# Patient Record
Sex: FEMALE | Race: WHITE | NOT HISPANIC OR LATINO | URBAN - METROPOLITAN AREA
[De-identification: names, ages, dates, MRNs, and addresses within clinical notes are randomized per-mention and may not be internally consistent; named-entity substitution may affect disease eponyms.]

---

## 2017-02-24 ENCOUNTER — OFFICE (AMBULATORY)
Dept: URBAN - METROPOLITAN AREA CLINIC 64 | Facility: CLINIC | Age: 57
End: 2017-02-24
Payer: OTHER GOVERNMENT

## 2017-02-24 ENCOUNTER — ON CAMPUS - OUTPATIENT (AMBULATORY)
Dept: URBAN - METROPOLITAN AREA HOSPITAL 2 | Facility: HOSPITAL | Age: 57
End: 2017-02-24
Payer: OTHER GOVERNMENT

## 2017-02-24 VITALS
SYSTOLIC BLOOD PRESSURE: 151 MMHG | DIASTOLIC BLOOD PRESSURE: 95 MMHG | OXYGEN SATURATION: 98 % | DIASTOLIC BLOOD PRESSURE: 80 MMHG | OXYGEN SATURATION: 93 % | DIASTOLIC BLOOD PRESSURE: 76 MMHG | DIASTOLIC BLOOD PRESSURE: 73 MMHG | DIASTOLIC BLOOD PRESSURE: 71 MMHG | TEMPERATURE: 97.5 F | HEIGHT: 67 IN | RESPIRATION RATE: 16 BRPM | SYSTOLIC BLOOD PRESSURE: 111 MMHG | HEART RATE: 93 BPM | RESPIRATION RATE: 18 BRPM | SYSTOLIC BLOOD PRESSURE: 103 MMHG | HEART RATE: 99 BPM | SYSTOLIC BLOOD PRESSURE: 136 MMHG | DIASTOLIC BLOOD PRESSURE: 75 MMHG | SYSTOLIC BLOOD PRESSURE: 127 MMHG | HEART RATE: 98 BPM | WEIGHT: 248 LBS | SYSTOLIC BLOOD PRESSURE: 154 MMHG | SYSTOLIC BLOOD PRESSURE: 120 MMHG | HEART RATE: 96 BPM | OXYGEN SATURATION: 99 % | OXYGEN SATURATION: 95 % | HEART RATE: 101 BPM | HEART RATE: 100 BPM | OXYGEN SATURATION: 94 % | DIASTOLIC BLOOD PRESSURE: 57 MMHG

## 2017-02-24 DIAGNOSIS — Z86.010 PERSONAL HISTORY OF COLONIC POLYPS: ICD-10-CM

## 2017-02-24 DIAGNOSIS — K62.1 RECTAL POLYP: ICD-10-CM

## 2017-02-24 DIAGNOSIS — K57.30 DIVERTICULOSIS OF LARGE INTESTINE WITHOUT PERFORATION OR ABS: ICD-10-CM

## 2017-02-24 DIAGNOSIS — K62.89 OTHER SPECIFIED DISEASES OF ANUS AND RECTUM: ICD-10-CM

## 2017-02-24 DIAGNOSIS — K64.8 OTHER HEMORRHOIDS: ICD-10-CM

## 2017-02-24 LAB
GI HISTOLOGY: A. UNSPECIFIED: (no result)
GI HISTOLOGY: PDF REPORT: (no result)

## 2017-02-24 PROCEDURE — 45380 COLONOSCOPY AND BIOPSY: CPT | Mod: 33 | Performed by: INTERNAL MEDICINE

## 2017-02-24 PROCEDURE — 88305 TISSUE EXAM BY PATHOLOGIST: CPT | Performed by: INTERNAL MEDICINE

## 2017-02-24 RX ADMIN — PROPOFOL: 10 INJECTION, EMULSION INTRAVENOUS at 10:26

## 2017-06-30 ENCOUNTER — HOSPITAL ENCOUNTER (OUTPATIENT)
Dept: RESPIRATORY THERAPY | Facility: HOSPITAL | Age: 57
Discharge: HOME | End: 2017-06-30
Attending: FAMILY MEDICINE | Admitting: FAMILY MEDICINE

## 2017-07-14 ENCOUNTER — HOSPITAL ENCOUNTER (OUTPATIENT)
Dept: GENERAL RADIOLOGY | Facility: HOSPITAL | Age: 57
Discharge: HOME | End: 2017-07-14
Attending: FAMILY MEDICINE | Admitting: FAMILY MEDICINE

## 2020-10-08 ENCOUNTER — OFFICE (AMBULATORY)
Dept: URBAN - METROPOLITAN AREA CLINIC 64 | Facility: CLINIC | Age: 60
End: 2020-10-08

## 2020-10-08 VITALS
HEART RATE: 73 BPM | HEIGHT: 67 IN | SYSTOLIC BLOOD PRESSURE: 136 MMHG | DIASTOLIC BLOOD PRESSURE: 100 MMHG | WEIGHT: 247 LBS

## 2020-10-08 DIAGNOSIS — K57.32 DIVERTICULITIS OF LARGE INTESTINE WITHOUT PERFORATION OR ABS: ICD-10-CM

## 2020-10-08 DIAGNOSIS — K21.9 GASTRO-ESOPHAGEAL REFLUX DISEASE WITHOUT ESOPHAGITIS: ICD-10-CM

## 2020-10-08 DIAGNOSIS — Z86.010 PERSONAL HISTORY OF COLONIC POLYPS: ICD-10-CM

## 2020-10-08 DIAGNOSIS — R10.31 RIGHT LOWER QUADRANT PAIN: ICD-10-CM

## 2020-10-08 DIAGNOSIS — R63.4 ABNORMAL WEIGHT LOSS: ICD-10-CM

## 2020-10-08 DIAGNOSIS — D37.3 NEOPLASM OF UNCERTAIN BEHAVIOR OF APPENDIX: ICD-10-CM

## 2020-10-08 PROCEDURE — 99203 OFFICE O/P NEW LOW 30 MIN: CPT | Performed by: INTERNAL MEDICINE

## 2020-11-09 ENCOUNTER — OFFICE (AMBULATORY)
Dept: URBAN - METROPOLITAN AREA PATHOLOGY 4 | Facility: PATHOLOGY | Age: 60
End: 2020-11-09
Payer: OTHER GOVERNMENT

## 2020-11-09 ENCOUNTER — ON CAMPUS - OUTPATIENT (AMBULATORY)
Dept: URBAN - METROPOLITAN AREA HOSPITAL 2 | Facility: HOSPITAL | Age: 60
End: 2020-11-09
Payer: OTHER GOVERNMENT

## 2020-11-09 VITALS
WEIGHT: 240 LBS | OXYGEN SATURATION: 94 % | SYSTOLIC BLOOD PRESSURE: 150 MMHG | DIASTOLIC BLOOD PRESSURE: 71 MMHG | SYSTOLIC BLOOD PRESSURE: 123 MMHG | SYSTOLIC BLOOD PRESSURE: 152 MMHG | DIASTOLIC BLOOD PRESSURE: 78 MMHG | HEART RATE: 68 BPM | RESPIRATION RATE: 18 BRPM | HEIGHT: 67 IN | DIASTOLIC BLOOD PRESSURE: 65 MMHG | DIASTOLIC BLOOD PRESSURE: 100 MMHG | SYSTOLIC BLOOD PRESSURE: 134 MMHG | OXYGEN SATURATION: 98 % | DIASTOLIC BLOOD PRESSURE: 91 MMHG | HEART RATE: 72 BPM | TEMPERATURE: 97.5 F | HEART RATE: 60 BPM | DIASTOLIC BLOOD PRESSURE: 66 MMHG | HEART RATE: 70 BPM | HEART RATE: 63 BPM | SYSTOLIC BLOOD PRESSURE: 146 MMHG | OXYGEN SATURATION: 100 % | SYSTOLIC BLOOD PRESSURE: 128 MMHG | RESPIRATION RATE: 16 BRPM | DIASTOLIC BLOOD PRESSURE: 73 MMHG

## 2020-11-09 DIAGNOSIS — Z90.49 ACQUIRED ABSENCE OF OTHER SPECIFIED PARTS OF DIGESTIVE TRACT: ICD-10-CM

## 2020-11-09 DIAGNOSIS — Z85.038 PERSONAL HISTORY OF OTHER MALIGNANT NEOPLASM OF LARGE INTEST: ICD-10-CM

## 2020-11-09 DIAGNOSIS — K57.30 DIVERTICULOSIS OF LARGE INTESTINE WITHOUT PERFORATION OR ABS: ICD-10-CM

## 2020-11-09 DIAGNOSIS — R10.31 RIGHT LOWER QUADRANT PAIN: ICD-10-CM

## 2020-11-09 DIAGNOSIS — K52.9 NONINFECTIVE GASTROENTERITIS AND COLITIS, UNSPECIFIED: ICD-10-CM

## 2020-11-09 DIAGNOSIS — R63.4 ABNORMAL WEIGHT LOSS: ICD-10-CM

## 2020-11-09 DIAGNOSIS — R93.3 ABNORMAL FINDINGS ON DIAGNOSTIC IMAGING OF OTHER PARTS OF DI: ICD-10-CM

## 2020-11-09 LAB
GI HISTOLOGY: A. UNSPECIFIED: (no result)
GI HISTOLOGY: PDF REPORT: (no result)

## 2020-11-09 PROCEDURE — 45380 COLONOSCOPY AND BIOPSY: CPT | Performed by: INTERNAL MEDICINE

## 2020-11-09 PROCEDURE — 88305 TISSUE EXAM BY PATHOLOGIST: CPT | Performed by: INTERNAL MEDICINE

## 2020-11-20 ENCOUNTER — OFFICE (AMBULATORY)
Dept: URBAN - METROPOLITAN AREA CLINIC 64 | Facility: CLINIC | Age: 60
End: 2020-11-20

## 2020-11-20 VITALS
SYSTOLIC BLOOD PRESSURE: 169 MMHG | HEIGHT: 67 IN | WEIGHT: 245 LBS | DIASTOLIC BLOOD PRESSURE: 86 MMHG | HEART RATE: 67 BPM

## 2020-11-20 DIAGNOSIS — R10.31 RIGHT LOWER QUADRANT PAIN: ICD-10-CM

## 2020-11-20 DIAGNOSIS — R63.4 ABNORMAL WEIGHT LOSS: ICD-10-CM

## 2020-11-20 DIAGNOSIS — Z86.010 PERSONAL HISTORY OF COLONIC POLYPS: ICD-10-CM

## 2020-11-20 DIAGNOSIS — R74.8 ABNORMAL LEVELS OF OTHER SERUM ENZYMES: ICD-10-CM

## 2020-11-20 DIAGNOSIS — D37.3 NEOPLASM OF UNCERTAIN BEHAVIOR OF APPENDIX: ICD-10-CM

## 2020-11-20 PROCEDURE — 99213 OFFICE O/P EST LOW 20 MIN: CPT | Performed by: INTERNAL MEDICINE

## 2021-02-23 ENCOUNTER — OFFICE (AMBULATORY)
Dept: URBAN - METROPOLITAN AREA CLINIC 64 | Facility: CLINIC | Age: 61
End: 2021-02-23

## 2021-02-23 VITALS
HEIGHT: 67 IN | SYSTOLIC BLOOD PRESSURE: 136 MMHG | HEART RATE: 77 BPM | WEIGHT: 243 LBS | DIASTOLIC BLOOD PRESSURE: 73 MMHG

## 2021-02-23 DIAGNOSIS — R10.31 RIGHT LOWER QUADRANT PAIN: ICD-10-CM

## 2021-02-23 DIAGNOSIS — R93.3 ABNORMAL FINDINGS ON DIAGNOSTIC IMAGING OF OTHER PARTS OF DI: ICD-10-CM

## 2021-02-23 DIAGNOSIS — D37.3 NEOPLASM OF UNCERTAIN BEHAVIOR OF APPENDIX: ICD-10-CM

## 2021-02-23 DIAGNOSIS — Z86.010 PERSONAL HISTORY OF COLONIC POLYPS: ICD-10-CM

## 2021-02-23 DIAGNOSIS — R63.4 ABNORMAL WEIGHT LOSS: ICD-10-CM

## 2021-02-23 PROCEDURE — 99211 OFF/OP EST MAY X REQ PHY/QHP: CPT | Performed by: INTERNAL MEDICINE

## 2021-05-17 ENCOUNTER — OFFICE (AMBULATORY)
Dept: URBAN - METROPOLITAN AREA CLINIC 64 | Facility: CLINIC | Age: 61
End: 2021-05-17

## 2021-05-17 VITALS
DIASTOLIC BLOOD PRESSURE: 78 MMHG | WEIGHT: 253 LBS | HEIGHT: 67 IN | SYSTOLIC BLOOD PRESSURE: 130 MMHG | HEART RATE: 69 BPM

## 2021-05-17 DIAGNOSIS — C7A.020 MALIGNANT CARCINOID TUMOR OF THE APPENDIX: ICD-10-CM

## 2021-05-17 PROCEDURE — 99213 OFFICE O/P EST LOW 20 MIN: CPT | Performed by: INTERNAL MEDICINE

## 2021-05-21 PROBLEM — R10.9 ABDOMINAL PAIN: Status: ACTIVE | Noted: 2020-02-08

## 2021-05-21 PROBLEM — Z80.1 FAMILY HISTORY OF LUNG CANCER: Status: ACTIVE | Noted: 2021-05-21

## 2021-05-21 PROBLEM — K38.8 MASS OF APPENDIX: Status: ACTIVE | Noted: 2020-03-20

## 2021-05-21 PROBLEM — G47.30 SLEEP APNEA: Status: ACTIVE | Noted: 2021-05-21

## 2021-05-21 RX ORDER — ALBUTEROL SULFATE 90 UG/1
2 AEROSOL, METERED RESPIRATORY (INHALATION)
COMMUNITY
End: 2021-12-10 | Stop reason: SDUPTHER

## 2021-05-21 RX ORDER — HYDROCODONE BITARTRATE AND ACETAMINOPHEN 5; 325 MG/1; MG/1
TABLET ORAL
COMMUNITY
End: 2021-05-24

## 2021-05-21 RX ORDER — DIPHENOXYLATE HYDROCHLORIDE AND ATROPINE SULFATE 2.5; .025 MG/1; MG/1
1 TABLET ORAL DAILY
COMMUNITY

## 2021-05-21 RX ORDER — ATORVASTATIN CALCIUM 20 MG/1
40 TABLET, FILM COATED ORAL DAILY
COMMUNITY
Start: 2013-05-28 | End: 2021-12-10 | Stop reason: SDUPTHER

## 2021-05-21 RX ORDER — SULFAMETHOXAZOLE AND TRIMETHOPRIM 800; 160 MG/1; MG/1
TABLET ORAL
COMMUNITY
End: 2021-05-24

## 2021-05-21 RX ORDER — CHLORAL HYDRATE 500 MG
1000 CAPSULE ORAL DAILY
COMMUNITY
End: 2021-12-10

## 2021-05-21 RX ORDER — METOPROLOL TARTRATE 50 MG/1
50 TABLET, FILM COATED ORAL
COMMUNITY
Start: 2013-05-28 | End: 2021-12-10 | Stop reason: SDUPTHER

## 2021-05-21 RX ORDER — HYDROCODONE BITARTRATE AND ACETAMINOPHEN 10; 325 MG/1; MG/1
TABLET ORAL
COMMUNITY
End: 2021-05-24

## 2021-05-21 RX ORDER — OMEPRAZOLE 40 MG/1
40 CAPSULE, DELAYED RELEASE ORAL DAILY
COMMUNITY
End: 2021-09-07 | Stop reason: SDUPTHER

## 2021-05-21 RX ORDER — METHYLPREDNISOLONE 4 MG/1
TABLET ORAL
COMMUNITY
End: 2021-05-24

## 2021-05-21 RX ORDER — MELOXICAM 15 MG/1
TABLET ORAL
COMMUNITY
Start: 2017-01-17 | End: 2021-05-24

## 2021-05-21 RX ORDER — ESOMEPRAZOLE MAGNESIUM 40 MG/1
CAPSULE, DELAYED RELEASE ORAL
COMMUNITY
Start: 2015-07-16 | End: 2021-05-24

## 2021-05-21 RX ORDER — CHLORAL HYDRATE 500 MG
CAPSULE ORAL
COMMUNITY
End: 2021-05-24

## 2021-05-21 RX ORDER — AMOXICILLIN AND CLAVULANATE POTASSIUM 875; 125 MG/1; MG/1
TABLET, FILM COATED ORAL
COMMUNITY
End: 2021-05-24

## 2021-05-21 RX ORDER — FERROUS SULFATE 325(65) MG
TABLET ORAL
COMMUNITY
Start: 2016-11-15 | End: 2021-05-24

## 2021-05-21 RX ORDER — LISINOPRIL 20 MG/1
20 TABLET ORAL DAILY
COMMUNITY
Start: 2013-05-28 | End: 2021-06-18 | Stop reason: SDUPTHER

## 2021-05-24 ENCOUNTER — OFFICE VISIT (OUTPATIENT)
Dept: SURGERY | Facility: CLINIC | Age: 61
End: 2021-05-24

## 2021-05-24 VITALS
BODY MASS INDEX: 40.34 KG/M2 | HEART RATE: 75 BPM | WEIGHT: 251 LBS | TEMPERATURE: 97.3 F | OXYGEN SATURATION: 98 % | HEIGHT: 66 IN | SYSTOLIC BLOOD PRESSURE: 128 MMHG | DIASTOLIC BLOOD PRESSURE: 88 MMHG

## 2021-05-24 DIAGNOSIS — D49.0 NEOPLASM OF APPENDIX: Primary | ICD-10-CM

## 2021-05-24 PROCEDURE — 99244 OFF/OP CNSLTJ NEW/EST MOD 40: CPT | Performed by: COLON & RECTAL SURGERY

## 2021-05-24 RX ORDER — ONDANSETRON 4 MG/1
TABLET, ORALLY DISINTEGRATING ORAL
COMMUNITY
Start: 2021-05-14 | End: 2021-05-24

## 2021-05-24 RX ORDER — DICLOFENAC SODIUM 75 MG/1
75 TABLET, DELAYED RELEASE ORAL
COMMUNITY
Start: 2021-02-20 | End: 2021-05-24

## 2021-05-24 RX ORDER — DICYCLOMINE HCL 20 MG
TABLET ORAL
COMMUNITY
Start: 2021-05-14 | End: 2021-05-24

## 2021-06-02 ENCOUNTER — TELEPHONE (OUTPATIENT)
Dept: SURGERY | Facility: CLINIC | Age: 61
End: 2021-06-02

## 2021-06-02 PROBLEM — D3A.020 CARCINOID TUMOR OF APPENDIX: Status: ACTIVE | Noted: 2021-06-02

## 2021-06-02 NOTE — TELEPHONE ENCOUNTER
Patient called asking if you have spoken to specialist regarding possible treatment options with surgery at [facility] [[email or web address] in [place]?    Please advice.    Home contact #[phone].    Thank you.

## 2021-06-09 DIAGNOSIS — K38.8 APPENDICULAR MUCOCELE: Primary | ICD-10-CM

## 2021-06-09 NOTE — TELEPHONE ENCOUNTER
Neeta Ellis is a surgical oncologist.  I will send referral bowel sounds normal/no distention/soft/nontender

## 2021-06-09 NOTE — TELEPHONE ENCOUNTER
Phone patient and gave her (Actually spelled it out for her) the Surgical Oncologist's name at [facility] that Dr. Castano has referred patient to, she was very happy.    Thank you.

## 2021-06-14 PROBLEM — D64.9 ANEMIA: Status: ACTIVE | Noted: 2020-04-01

## 2021-06-14 PROBLEM — M72.2 PLANTAR FASCIITIS, RIGHT: Status: ACTIVE | Noted: 2021-02-20

## 2021-06-14 PROBLEM — K38.8 MASS OF APPENDIX: Status: RESOLVED | Noted: 2020-03-20 | Resolved: 2021-06-14

## 2021-06-14 PROBLEM — D37.3 LOW GRADE MUCINOUS NEOPLASM OF APPENDIX: Status: ACTIVE | Noted: 2020-03-20

## 2021-06-14 PROBLEM — R06.00 DYSPNEA: Status: ACTIVE | Noted: 2020-07-13

## 2021-06-14 PROBLEM — A49.02 MRSA INFECTION: Status: ACTIVE | Noted: 2020-04-25

## 2021-06-14 PROBLEM — M77.31 CALCANEAL SPUR OF FOOT, RIGHT: Status: ACTIVE | Noted: 2021-02-20

## 2021-06-14 NOTE — PATIENT INSTRUCTIONS
Health Maintenance Due   Topic Date Due   • COLORECTAL CANCER SCREENING  Never done   • ANNUAL PHYSICAL  Never done   • Pneumococcal Vaccine 0-64 (1 of 1 - PPSV23) Never done   • COVID-19 Vaccine (1) Never done   • TDAP/TD VACCINES (1 - Tdap) 08/09/1980   • ZOSTER VACCINE (1 of 2) Never done   • MAMMOGRAM  05/20/2015   • HEPATITIS C SCREENING  Never done   • PAP SMEAR  Never done   • LIPID PANEL  Never done     Check blood pressure cuff for accuracy and send 10 blood pressures over 2 weeks.  Watch sodium, alcohol and weight    Patient to call Thursday about [facility] appointment.  Patient to have Dr. Sasha Castano's office send records(staff to give phone number for records to be sent)    Don't get mammogram for 6 weeks after second Covid shot.

## 2021-06-15 ENCOUNTER — OFFICE VISIT (OUTPATIENT)
Dept: FAMILY MEDICINE CLINIC | Facility: CLINIC | Age: 61
End: 2021-06-15

## 2021-06-15 VITALS
OXYGEN SATURATION: 94 % | HEIGHT: 66 IN | TEMPERATURE: 98 F | SYSTOLIC BLOOD PRESSURE: 157 MMHG | HEART RATE: 71 BPM | DIASTOLIC BLOOD PRESSURE: 74 MMHG | BODY MASS INDEX: 40.98 KG/M2 | WEIGHT: 255 LBS

## 2021-06-15 DIAGNOSIS — K21.9 GASTROESOPHAGEAL REFLUX DISEASE WITHOUT ESOPHAGITIS: ICD-10-CM

## 2021-06-15 DIAGNOSIS — J45.909 ASTHMA, UNSPECIFIED ASTHMA SEVERITY, UNSPECIFIED WHETHER COMPLICATED, UNSPECIFIED WHETHER PERSISTENT: ICD-10-CM

## 2021-06-15 DIAGNOSIS — Z12.31 ENCOUNTER FOR SCREENING MAMMOGRAM FOR MALIGNANT NEOPLASM OF BREAST: ICD-10-CM

## 2021-06-15 DIAGNOSIS — G47.30 SLEEP APNEA, UNSPECIFIED TYPE: ICD-10-CM

## 2021-06-15 DIAGNOSIS — Z91.89 ENCOUNTER FOR HEPATITIS C VIRUS SCREENING TEST FOR HIGH RISK PATIENT: ICD-10-CM

## 2021-06-15 DIAGNOSIS — D64.9 ANEMIA, UNSPECIFIED TYPE: ICD-10-CM

## 2021-06-15 DIAGNOSIS — K59.00 CONSTIPATION, UNSPECIFIED CONSTIPATION TYPE: ICD-10-CM

## 2021-06-15 DIAGNOSIS — E55.9 VITAMIN D DEFICIENCY: ICD-10-CM

## 2021-06-15 DIAGNOSIS — D37.3 LOW GRADE MUCINOUS NEOPLASM OF APPENDIX: ICD-10-CM

## 2021-06-15 DIAGNOSIS — Z00.01 ENCOUNTER FOR ANNUAL GENERAL MEDICAL EXAMINATION WITH ABNORMAL FINDINGS IN ADULT: Primary | ICD-10-CM

## 2021-06-15 DIAGNOSIS — Z11.4 SCREENING FOR HIV (HUMAN IMMUNODEFICIENCY VIRUS): ICD-10-CM

## 2021-06-15 DIAGNOSIS — E78.5 HYPERLIPIDEMIA, UNSPECIFIED HYPERLIPIDEMIA TYPE: ICD-10-CM

## 2021-06-15 DIAGNOSIS — Z11.59 ENCOUNTER FOR HEPATITIS C VIRUS SCREENING TEST FOR HIGH RISK PATIENT: ICD-10-CM

## 2021-06-15 DIAGNOSIS — E07.9 DISEASE OF THYROID GLAND: ICD-10-CM

## 2021-06-15 DIAGNOSIS — N83.8 OVARIAN ENLARGEMENT, LEFT: ICD-10-CM

## 2021-06-15 DIAGNOSIS — F32.A DEPRESSION, UNSPECIFIED DEPRESSION TYPE: ICD-10-CM

## 2021-06-15 DIAGNOSIS — E66.01 CLASS 3 SEVERE OBESITY DUE TO EXCESS CALORIES WITH SERIOUS COMORBIDITY AND BODY MASS INDEX (BMI) OF 40.0 TO 44.9 IN ADULT (HCC): ICD-10-CM

## 2021-06-15 DIAGNOSIS — I10 ESSENTIAL HYPERTENSION: ICD-10-CM

## 2021-06-15 DIAGNOSIS — Z78.0 POST-MENOPAUSAL: ICD-10-CM

## 2021-06-15 PROBLEM — E66.813 CLASS 3 SEVERE OBESITY WITH SERIOUS COMORBIDITY AND BODY MASS INDEX (BMI) OF 40.0 TO 44.9 IN ADULT: Status: ACTIVE | Noted: 2021-06-15

## 2021-06-15 PROCEDURE — 99396 PREV VISIT EST AGE 40-64: CPT | Performed by: PREVENTIVE MEDICINE

## 2021-06-15 PROCEDURE — 99214 OFFICE O/P EST MOD 30 MIN: CPT | Performed by: PREVENTIVE MEDICINE

## 2021-06-15 NOTE — PROGRESS NOTES
Subjective   Eve Garrett is a 60 y.o. female presents for   Chief Complaint   Patient presents with   • Annual Exam     non fasting (had coffee this morning with creamer)       Health Maintenance Due   Topic Date Due   • COLORECTAL CANCER SCREENING  Never done   • ANNUAL PHYSICAL  Never done   • Pneumococcal Vaccine 0-64 (1 of 1 - PPSV23) Never done   • TDAP/TD VACCINES (1 - Tdap) 08/09/1980   • ZOSTER VACCINE (1 of 2) Never done   • MAMMOGRAM  05/20/2015   • HEPATITIS C SCREENING  Never done   • PAP SMEAR  Never done   • LIPID PANEL  Never done       60-year-old white female presents today for annual wellness and to transition into care as well as to follow-up on multiple chronic health conditions patient has been advised to wear sunscreen and seatbelt.  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  She has seen multiple specialists and needs to be referred to you we stopped and pursued this throughout the exam and have referred her to see an oncology surgeon in [place].  Patient has multiple family members with cancer and we will suggest that she does get some genetic cancer cancer screening when she returns.  Patient has also had some enlargement of an ovary that was never followed up on so we have ordered an ultrasound of the ovary as well.  Patient has had her Covid vaccination and will not get her mammogram for 6 weeks after her second shot.  Patient has also had multiple episodes of diverticulitis in 2 lengths of her colon have been removed in prior surgery.  Patient has not been running a fever and has had only mild increase in abdominal pain recently.  She does express that she is quite nervous over this cancer diagnosis and that is why we are trying to make the referral soon as possible she does not feel as though she is homicidal or suicidal       Vitals:    06/15/21 0934 06/15/21 0936   BP: 149/81 157/74   BP Location: Right arm Left arm   Patient Position: Sitting  "Sitting   Cuff Size: Adult Adult   Pulse: 71    Temp: 98 °F (36.7 °C)    SpO2: 94%    Weight: 116 kg (255 lb)    Height: 167.6 cm (65.98\")      Body mass index is 41.18 kg/m².    Current Outpatient Medications on File Prior to Visit   Medication Sig Dispense Refill   • albuterol sulfate  (90 Base) MCG/ACT inhaler Inhale 2 puffs.     • atorvastatin (LIPITOR) 20 MG tablet Take 20 mg by mouth Daily.     • lisinopril (PRINIVIL,ZESTRIL) 20 MG tablet Take 20 mg by mouth Daily.     • metoprolol tartrate (LOPRESSOR) 50 MG tablet Take 50 mg by mouth.     • multivitamin (THERAGRAN) tablet tablet Take 1 tablet by mouth Daily.     • Omega-3 Fatty Acids (fish oil) 1000 MG capsule capsule Take 1,000 mg by mouth Daily.     • omeprazole (priLOSEC) 40 MG capsule Take 40 mg by mouth Daily.     • Triamcinolone Acetonide (NASACORT AQ NA) into the nostril(s) as directed by provider.       No current facility-administered medications on file prior to visit.       The following portions of the patient's history were reviewed and updated as appropriate: allergies, current medications, past family history, past medical history, past social history, past surgical history and problem list.    Review of Systems   Constitutional: Negative.    HENT: Negative.  Negative for sinus pressure and sore throat.    Eyes: Negative.    Respiratory: Negative.  Negative for cough.    Cardiovascular: Negative.    Gastrointestinal: Positive for abdominal pain, constipation, diarrhea, nausea and indigestion.   Endocrine: Negative.    Genitourinary: Negative.    Musculoskeletal: Positive for arthralgias.   Skin: Negative.    Allergic/Immunologic: Positive for environmental allergies.   Neurological: Negative.    Hematological: Negative.    Psychiatric/Behavioral: Positive for stress. The patient is nervous/anxious.        Objective   Physical Exam  Vitals reviewed.   Constitutional:       General: She is not in acute distress.     Appearance: She is " well-developed. She is obese. She is not ill-appearing or toxic-appearing.   HENT:      Head: Normocephalic and atraumatic.      Right Ear: Tympanic membrane, ear canal and external ear normal.      Left Ear: Tympanic membrane, ear canal and external ear normal.      Nose: Nose normal.   Eyes:      Extraocular Movements: Extraocular movements intact.      Conjunctiva/sclera: Conjunctivae normal.      Pupils: Pupils are equal, round, and reactive to light.   Cardiovascular:      Rate and Rhythm: Normal rate and regular rhythm.      Heart sounds: Normal heart sounds.   Pulmonary:      Effort: Pulmonary effort is normal.      Breath sounds: Normal breath sounds.   Abdominal:      General: Bowel sounds are normal. There is no distension.      Palpations: Abdomen is soft. There is no mass.      Tenderness: There is no abdominal tenderness.   Musculoskeletal:         General: Normal range of motion.      Cervical back: Neck supple.   Skin:     General: Skin is warm.   Neurological:      General: No focal deficit present.      Mental Status: She is alert and oriented to person, place, and time.   Psychiatric:         Mood and Affect: Mood normal.         Behavior: Behavior normal.       PHQ-9 Total Score: 0    Assessment/Plan   Diagnoses and all orders for this visit:    1. Encounter for annual general medical examination with abnormal findings in adult (Primary)  Comments:  Patient has been advised to wear sunscreen and seatbelt    2. Encounter for screening mammogram for malignant neoplasm of breast  -     Mammo Screening Digital Tomosynthesis Bilateral With CAD; Future    3. Screening for HIV (human immunodeficiency virus)  -     HIV-1 & HIV-2 Antibodies    4. Encounter for hepatitis C virus screening test for high risk patient  -     Hepatitis C Antibody    5. Asthma, unspecified asthma severity, unspecified whether complicated, unspecified whether persistent  Comments:  Try Incruse and have albuterol on hand    6.  Constipation, unspecified constipation type  Comments:  Patient has alternating constipation and diarrhea    7. Depression, unspecified depression type  -     TSH  -     Vitamin B12    8. Gastroesophageal reflux disease without esophagitis  -     CBC Auto Differential  -     Magnesium    9. Essential hypertension  Comments:  Didn't take med today and will on home moniter.  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 Hyperlipidemia, unspecified hyperlipidemia type  Comments:  Trying to eat less saturated fats  Orders:  -     Lipid Panel    12. Sleep apnea, unspecified type  Comments:  Couldn't wear full mask.  Refer for O2 or nasal pillow  Orders:  -     Ambulatory Referral to Sleep Medicine    13. Anemia, unspecified type  Comments:  No noted blood loss    14. Disease of thyroid gland  Comments:  No dry skin palpitations or increased hair loss    15. Ovarian enlargement, left  Comments:  US ordered as has had no F/U  Orders:  -     US Non-ob Transvaginal; Future    16. Post-menopausal  Comments:  DXA ordered  Orders:  -     DEXA Bone Density Axial; Future    17. Vitamin D deficiency  Comments:  nOT TAKING  Orders:  -     Vitamin D 25 Hydroxy    18. Class 3 severe obesity due to excess calories with serious comorbidity and body mass index (BMI) of 40.0 to 44.9 in adult (CMS/[facility])    Other orders  -     Cancel: Ambulatory Referral For Screening Colonoscopy  -     Umeclidinium Bromide (Incruse Ellipta) 62.5 MCG/INH aerosol powder ; Inhale 1 puff Daily.  Dispense: 1 each; Refill: 1        Patient Instructions     Health Maintenance Due   Topic Date Due   • COLORECTAL CANCER SCREENING  Never done   • ANNUAL PHYSICAL  Never done   • Pneumococcal Vaccine 0-64 (1 of 1 - PPSV23) Never done   • COVID-19 Vaccine (1) Never done   • TDAP/TD VACCINES (1 - Tdap) 08/09/1980   • ZOSTER VACCINE (1 of 2) Never done   • MAMMOGRAM   05/20/2015   • HEPATITIS C SCREENING  Never done   • PAP SMEAR  Never done   • LIPID PANEL  Never done     Check blood pressure cuff for accuracy and send 10 blood pressures over 2 weeks.  Watch sodium, alcohol and weight    Patient to call Thursday about [facility] appointment.  Patient to have Dr. Sasha Castano's office send records(staff to give phone number for records to be sent)    Don't get mammogram for 6 weeks after second Covid shot.

## 2021-06-16 ENCOUNTER — TELEPHONE (OUTPATIENT)
Dept: FAMILY MEDICINE CLINIC | Facility: CLINIC | Age: 61
End: 2021-06-16

## 2021-06-16 NOTE — TELEPHONE ENCOUNTER
I called and spoke with Cookie @ Dr. Terry Castano's office. She took all info that was provided about pt's preference to go to [facility] in [facility] to see Dr. Neeta Ellis I gave her their phone numbers [phone] & [phone]. Their fax # [phone]. Advised all records needed to be sent in AttN: [facility]. Once [facility] gets all info and is able to review they will schedule pt ASAP.    Cookie advised that she would update Dr. Castano with Pt's preference and would get all of the previous records forwarded.

## 2021-06-17 ENCOUNTER — TELEPHONE (OUTPATIENT)
Dept: FAMILY MEDICINE CLINIC | Facility: CLINIC | Age: 61
End: 2021-06-17

## 2021-06-17 ENCOUNTER — CLINICAL SUPPORT (OUTPATIENT)
Dept: FAMILY MEDICINE CLINIC | Facility: CLINIC | Age: 61
End: 2021-06-17

## 2021-06-17 DIAGNOSIS — R10.84 GENERALIZED ABDOMINAL PAIN: ICD-10-CM

## 2021-06-17 LAB
25(OH)D3 SERPL-MCNC: 19.1 NG/ML (ref 30–100)
ALBUMIN SERPL-MCNC: 4.1 G/DL (ref 3.5–5.2)
ALBUMIN/GLOB SERPL: 1.6 G/DL
ALP SERPL-CCNC: 103 U/L (ref 39–117)
ALT SERPL W P-5'-P-CCNC: 24 U/L (ref 1–33)
ANION GAP SERPL CALCULATED.3IONS-SCNC: 9 MMOL/L (ref 5–15)
ANISOCYTOSIS BLD QL: ABNORMAL
AST SERPL-CCNC: 22 U/L (ref 1–32)
BILIRUB SERPL-MCNC: 0.3 MG/DL (ref 0–1.2)
BUN SERPL-MCNC: 12 MG/DL (ref 6–20)
BUN/CREAT SERPL: 18.2 (ref 7–25)
CALCIUM SPEC-SCNC: 9.1 MG/DL (ref 8.6–10.5)
CHLORIDE SERPL-SCNC: 104 MMOL/L (ref 98–107)
CHOLEST SERPL-MCNC: 149 MG/DL (ref 0–200)
CO2 SERPL-SCNC: 25 MMOL/L (ref 22–29)
CREAT SERPL-MCNC: 0.66 MG/DL (ref 0.57–1)
DEPRECATED RDW RBC AUTO: 41.8 FL (ref 37–54)
EOSINOPHIL # BLD MANUAL: 0.39 10*3/MM3 (ref 0–0.4)
EOSINOPHIL NFR BLD MANUAL: 6.1 % (ref 0.3–6.2)
ERYTHROCYTE [DISTWIDTH] IN BLOOD BY AUTOMATED COUNT: 15.8 % (ref 12.3–15.4)
GFR SERPL CREATININE-BSD FRML MDRD: 92 ML/MIN/1.73
GLOBULIN UR ELPH-MCNC: 2.6 GM/DL
GLUCOSE SERPL-MCNC: 126 MG/DL (ref 65–99)
HCT VFR BLD AUTO: 37.8 % (ref 34–46.6)
HCV AB SER DONR QL: NORMAL
HDLC SERPL-MCNC: 35 MG/DL (ref 40–60)
HGB BLD-MCNC: 11.6 G/DL (ref 12–15.9)
HIV1+2 AB SER QL: NORMAL
LDLC SERPL CALC-MCNC: 80 MG/DL (ref 0–100)
LDLC/HDLC SERPL: 2.1 {RATIO}
LYMPHOCYTES # BLD MANUAL: 2.28 10*3/MM3 (ref 0.7–3.1)
LYMPHOCYTES NFR BLD MANUAL: 12.1 % (ref 5–12)
LYMPHOCYTES NFR BLD MANUAL: 35.4 % (ref 19.6–45.3)
MAGNESIUM SERPL-MCNC: 2 MG/DL (ref 1.6–2.6)
MCH RBC QN AUTO: 22.7 PG (ref 26.6–33)
MCHC RBC AUTO-ENTMCNC: 30.7 G/DL (ref 31.5–35.7)
MCV RBC AUTO: 74 FL (ref 79–97)
MICROCYTES BLD QL: ABNORMAL
MONOCYTES # BLD AUTO: 0.78 10*3/MM3 (ref 0.1–0.9)
NEUTROPHILS # BLD AUTO: 2.99 10*3/MM3 (ref 1.7–7)
NEUTROPHILS NFR BLD MANUAL: 46.5 % (ref 42.7–76)
PLAT MORPH BLD: NORMAL
PLATELET # BLD AUTO: 316 10*3/MM3 (ref 140–450)
PMV BLD AUTO: 10.2 FL (ref 6–12)
POLYCHROMASIA BLD QL SMEAR: ABNORMAL
POTASSIUM SERPL-SCNC: 4 MMOL/L (ref 3.5–5.2)
PROT SERPL-MCNC: 6.7 G/DL (ref 6–8.5)
RBC # BLD AUTO: 5.11 10*6/MM3 (ref 3.77–5.28)
SODIUM SERPL-SCNC: 138 MMOL/L (ref 136–145)
TRIGL SERPL-MCNC: 203 MG/DL (ref 0–150)
TSH SERPL DL<=0.05 MIU/L-ACNC: 3.68 UIU/ML (ref 0.27–4.2)
VIT B12 BLD-MCNC: 449 PG/ML (ref 211–946)
VLDLC SERPL-MCNC: 34 MG/DL (ref 5–40)
WBC # BLD AUTO: 6.44 10*3/MM3 (ref 3.4–10.8)
WBC MORPH BLD: NORMAL

## 2021-06-17 PROCEDURE — 85007 BL SMEAR W/DIFF WBC COUNT: CPT | Performed by: PREVENTIVE MEDICINE

## 2021-06-17 PROCEDURE — 84466 ASSAY OF TRANSFERRIN: CPT | Performed by: PREVENTIVE MEDICINE

## 2021-06-17 PROCEDURE — 82728 ASSAY OF FERRITIN: CPT | Performed by: PREVENTIVE MEDICINE

## 2021-06-17 PROCEDURE — 86803 HEPATITIS C AB TEST: CPT | Performed by: PREVENTIVE MEDICINE

## 2021-06-17 PROCEDURE — 82306 VITAMIN D 25 HYDROXY: CPT | Performed by: PREVENTIVE MEDICINE

## 2021-06-17 PROCEDURE — 85025 COMPLETE CBC W/AUTO DIFF WBC: CPT | Performed by: PREVENTIVE MEDICINE

## 2021-06-17 PROCEDURE — 82746 ASSAY OF FOLIC ACID SERUM: CPT | Performed by: PREVENTIVE MEDICINE

## 2021-06-17 PROCEDURE — 36415 COLL VENOUS BLD VENIPUNCTURE: CPT | Performed by: PREVENTIVE MEDICINE

## 2021-06-17 PROCEDURE — 80061 LIPID PANEL: CPT | Performed by: PREVENTIVE MEDICINE

## 2021-06-17 PROCEDURE — G0432 EIA HIV-1/HIV-2 SCREEN: HCPCS | Performed by: PREVENTIVE MEDICINE

## 2021-06-17 PROCEDURE — 83735 ASSAY OF MAGNESIUM: CPT | Performed by: PREVENTIVE MEDICINE

## 2021-06-17 PROCEDURE — 84443 ASSAY THYROID STIM HORMONE: CPT | Performed by: PREVENTIVE MEDICINE

## 2021-06-17 PROCEDURE — 82607 VITAMIN B-12: CPT | Performed by: PREVENTIVE MEDICINE

## 2021-06-17 PROCEDURE — 85045 AUTOMATED RETICULOCYTE COUNT: CPT | Performed by: PREVENTIVE MEDICINE

## 2021-06-17 PROCEDURE — 80053 COMPREHEN METABOLIC PANEL: CPT | Performed by: PREVENTIVE MEDICINE

## 2021-06-17 PROCEDURE — 83540 ASSAY OF IRON: CPT | Performed by: PREVENTIVE MEDICINE

## 2021-06-17 RX ORDER — TIOTROPIUM BROMIDE INHALATION SPRAY 1.56 UG/1
2 SPRAY, METERED RESPIRATORY (INHALATION)
Qty: 3 EACH | Refills: 3 | Status: SHIPPED | OUTPATIENT
Start: 2021-06-17 | End: 2021-12-10

## 2021-06-17 NOTE — TELEPHONE ENCOUNTER
Caller: Katy Garrett    Relationship: Self    Best call back number:[phone]   What is the best time to reach you:   Who are you requesting to speak with (clinical staff, provider,  specific staff member): CLINICAL    Do you know the name of the person who called:KATY    What was the call regarding:  PATIENTS PHARMACY DOES NOT HAVE INCRUSE ELLIPTA SO SHE WOULD LIKE YOU TO SEND SPIRIVA INSTEAD   Do you require a callback:NO JUST SEND TO PHARMACY    [facility] -  [place], [place] - [address] - [phone] [facility] [phone]   [phone]

## 2021-06-17 NOTE — PROGRESS NOTES
Venipuncture Blood Specimen Collection  Venipuncture performed in right hand by Margaret Flaherty MA with good hemostasis. Patient tolerated the procedure well without complications.   06/17/21   Margaret Flaherty MA

## 2021-06-18 ENCOUNTER — TELEPHONE (OUTPATIENT)
Dept: FAMILY MEDICINE CLINIC | Facility: CLINIC | Age: 61
End: 2021-06-18

## 2021-06-18 DIAGNOSIS — D64.9 ANEMIA, UNSPECIFIED TYPE: Primary | ICD-10-CM

## 2021-06-18 LAB
FERRITIN SERPL-MCNC: 12.7 NG/ML (ref 13–150)
FOLATE SERPL-MCNC: 6.29 NG/ML (ref 4.78–24.2)
IRON 24H UR-MRATE: 51 MCG/DL (ref 37–145)
IRON SATN MFR SERPL: 12 % (ref 20–50)
RETICS # AUTO: 0.09 10*6/MM3 (ref 0.02–0.13)
RETICS/RBC NFR AUTO: 1.78 % (ref 0.7–1.9)
TIBC SERPL-MCNC: 432 MCG/DL (ref 298–536)
TRANSFERRIN SERPL-MCNC: 290 MG/DL (ref 200–360)

## 2021-06-18 RX ORDER — LISINOPRIL 20 MG/1
20 TABLET ORAL DAILY
Qty: 90 TABLET | Refills: 3 | Status: SHIPPED | OUTPATIENT
Start: 2021-06-18 | End: 2021-10-12 | Stop reason: DRUGHIGH

## 2021-06-18 NOTE — PROGRESS NOTES
Vitamin d is low so take or increase otc daily.  Mild anemia that is improving and looks like may be iron dependent.  Eat foods rich in iron like green leafy vegetables.  Glucose elevated to 126 if this was fasting, so limit carbs and walk.  Finally bad cholesterol 80, so watch sat fats and walk.

## 2021-06-18 NOTE — TELEPHONE ENCOUNTER
HUB TO READ  ----- Message from Nunu Kingston MD sent at 6/18/2021  1:47 PM EDT -----  Vitamin d is low so take or increase otc daily.  Mild anemia that is improving and looks like may be iron dependent.  Eat foods rich in iron like green leafy vegetables.  Glucose elevated to 126 if this was fasting, so limit carbs and walk.  Finally bad cholesterol 80, so watch sat fats and walk.

## 2021-06-23 ENCOUNTER — TELEPHONE (OUTPATIENT)
Dept: FAMILY MEDICINE CLINIC | Facility: CLINIC | Age: 61
End: 2021-06-23

## 2021-06-23 NOTE — TELEPHONE ENCOUNTER
----- Message from Nunu Kingston MD sent at 6/23/2021  9:56 AM EDT -----      ----- Message -----  From: Terry Castano MD  Sent: 6/22/2021   7:22 PM EDT  To: Nunu Kingston MD    Author: Nunu Kingston MD Service: -- Author Type: Physician   Filed: 06/23/21 0957 Encounter Date: 5/24/2021 Status: Signed   : Nunu Kingston MD (Physician)        Show:Clear all  [x]Manual[]Template[]Copied    Added by:  [x]Nunu Kingston MD    []Hover for details  Please call patient and make sure she has been seen by [facility] doctors for Appendix cancer

## 2021-06-23 NOTE — TELEPHONE ENCOUNTER
----- Message from Nunu Kingston MD sent at 6/23/2021  9:56 AM EDT -----      ----- Message -----  From: Terry Castano MD  Sent: 6/22/2021   7:22 PM EDT  To: Nunu Kingston MD

## 2021-06-25 ENCOUNTER — HOSPITAL ENCOUNTER (OUTPATIENT)
Dept: ULTRASOUND IMAGING | Facility: HOSPITAL | Age: 61
Discharge: HOME | End: 2021-06-25
Admitting: PREVENTIVE MEDICINE

## 2021-06-25 DIAGNOSIS — N83.8 OVARIAN ENLARGEMENT, LEFT: ICD-10-CM

## 2021-06-25 PROCEDURE — 93976 VASCULAR STUDY: CPT

## 2021-06-25 PROCEDURE — 76830 TRANSVAGINAL US NON-OB: CPT

## 2021-06-28 ENCOUNTER — TELEPHONE (OUTPATIENT)
Dept: FAMILY MEDICINE CLINIC | Facility: CLINIC | Age: 61
End: 2021-06-28

## 2021-06-28 NOTE — TELEPHONE ENCOUNTER
Hub to read    ----- Message from Nunu Kingston MD sent at 6/25/2021  6:02 PM EDT -----  Both ovaries NS/NS  Can refer to GYN if she desires.

## 2021-06-28 NOTE — TELEPHONE ENCOUNTER
I called and notified pt. She verbalized understanding, and doesn't want to go for GYN at this time.

## 2021-08-16 ENCOUNTER — APPOINTMENT (OUTPATIENT)
Dept: CT IMAGING | Facility: HOSPITAL | Age: 61
End: 2021-08-16

## 2021-08-16 ENCOUNTER — HOSPITAL ENCOUNTER (EMERGENCY)
Facility: HOSPITAL | Age: 61
Discharge: HOME | End: 2021-08-16
Attending: EMERGENCY MEDICINE | Admitting: EMERGENCY MEDICINE

## 2021-08-16 ENCOUNTER — HOSPITAL ENCOUNTER (OUTPATIENT)
Dept: BONE DENSITY | Facility: HOSPITAL | Age: 61
Discharge: HOME | End: 2021-08-16

## 2021-08-16 ENCOUNTER — TELEPHONE (OUTPATIENT)
Dept: FAMILY MEDICINE CLINIC | Facility: CLINIC | Age: 61
End: 2021-08-16

## 2021-08-16 ENCOUNTER — HOSPITAL ENCOUNTER (OUTPATIENT)
Dept: MAMMOGRAPHY | Facility: HOSPITAL | Age: 61
Discharge: HOME | End: 2021-08-16

## 2021-08-16 VITALS
BODY MASS INDEX: 42.27 KG/M2 | RESPIRATION RATE: 16 BRPM | OXYGEN SATURATION: 99 % | HEIGHT: 65 IN | DIASTOLIC BLOOD PRESSURE: 82 MMHG | SYSTOLIC BLOOD PRESSURE: 170 MMHG | TEMPERATURE: 98.6 F | WEIGHT: 253.71 LBS | HEART RATE: 72 BPM

## 2021-08-16 DIAGNOSIS — Z12.31 ENCOUNTER FOR SCREENING MAMMOGRAM FOR MALIGNANT NEOPLASM OF BREAST: ICD-10-CM

## 2021-08-16 DIAGNOSIS — R10.9 ABDOMINAL PAIN, UNSPECIFIED ABDOMINAL LOCATION: Primary | ICD-10-CM

## 2021-08-16 DIAGNOSIS — Z78.0 POST-MENOPAUSAL: ICD-10-CM

## 2021-08-16 LAB
ALBUMIN SERPL-MCNC: 4.1 G/DL (ref 3.5–5.2)
ALBUMIN/GLOB SERPL: 1.1 G/DL
ALP SERPL-CCNC: 115 U/L (ref 39–117)
ALT SERPL W P-5'-P-CCNC: 24 U/L (ref 1–33)
ANION GAP SERPL CALCULATED.3IONS-SCNC: 11 MMOL/L (ref 5–15)
AST SERPL-CCNC: 23 U/L (ref 1–32)
BASOPHILS # BLD AUTO: 0.1 10*3/MM3 (ref 0–0.2)
BASOPHILS NFR BLD AUTO: 1.5 % (ref 0–1.5)
BILIRUB SERPL-MCNC: 0.3 MG/DL (ref 0–1.2)
BILIRUB UR QL STRIP: NEGATIVE
BUN SERPL-MCNC: 10 MG/DL (ref 8–23)
BUN/CREAT SERPL: 12.5 (ref 7–25)
CALCIUM SPEC-SCNC: 9.5 MG/DL (ref 8.6–10.5)
CHLORIDE SERPL-SCNC: 102 MMOL/L (ref 98–107)
CLARITY UR: CLEAR
CO2 SERPL-SCNC: 26 MMOL/L (ref 22–29)
COLOR UR: YELLOW
CREAT SERPL-MCNC: 0.8 MG/DL (ref 0.57–1)
DEPRECATED RDW RBC AUTO: 45.1 FL (ref 37–54)
EOSINOPHIL # BLD AUTO: 0.2 10*3/MM3 (ref 0–0.4)
EOSINOPHIL NFR BLD AUTO: 3 % (ref 0.3–6.2)
ERYTHROCYTE [DISTWIDTH] IN BLOOD BY AUTOMATED COUNT: 17.8 % (ref 12.3–15.4)
GFR SERPL CREATININE-BSD FRML MDRD: 73 ML/MIN/1.73
GLOBULIN UR ELPH-MCNC: 3.7 GM/DL
GLUCOSE SERPL-MCNC: 98 MG/DL (ref 65–99)
GLUCOSE UR STRIP-MCNC: NEGATIVE MG/DL
HCT VFR BLD AUTO: 36.8 % (ref 34–46.6)
HGB BLD-MCNC: 11.6 G/DL (ref 12–15.9)
HGB UR QL STRIP.AUTO: NEGATIVE
HOLD SPECIMEN: NORMAL
KETONES UR QL STRIP: NEGATIVE
LEUKOCYTE ESTERASE UR QL STRIP.AUTO: NEGATIVE
LIPASE SERPL-CCNC: 22 U/L (ref 13–60)
LYMPHOCYTES # BLD AUTO: 2.5 10*3/MM3 (ref 0.7–3.1)
LYMPHOCYTES NFR BLD AUTO: 34.6 % (ref 19.6–45.3)
MCH RBC QN AUTO: 22.6 PG (ref 26.6–33)
MCHC RBC AUTO-ENTMCNC: 31.4 G/DL (ref 31.5–35.7)
MCV RBC AUTO: 72 FL (ref 79–97)
MONOCYTES # BLD AUTO: 0.8 10*3/MM3 (ref 0.1–0.9)
MONOCYTES NFR BLD AUTO: 11.1 % (ref 5–12)
NEUTROPHILS NFR BLD AUTO: 3.6 10*3/MM3 (ref 1.7–7)
NEUTROPHILS NFR BLD AUTO: 49.8 % (ref 42.7–76)
NITRITE UR QL STRIP: NEGATIVE
NRBC BLD AUTO-RTO: 0.1 /100 WBC (ref 0–0.2)
PH UR STRIP.AUTO: 7 [PH] (ref 5–8)
PLATELET # BLD AUTO: 325 10*3/MM3 (ref 140–450)
PMV BLD AUTO: 7.2 FL (ref 6–12)
POTASSIUM SERPL-SCNC: 3.8 MMOL/L (ref 3.5–5.2)
PROT SERPL-MCNC: 7.8 G/DL (ref 6–8.5)
PROT UR QL STRIP: NEGATIVE
RBC # BLD AUTO: 5.11 10*6/MM3 (ref 3.77–5.28)
SODIUM SERPL-SCNC: 139 MMOL/L (ref 136–145)
SP GR UR STRIP: 1.02 (ref 1–1.03)
UROBILINOGEN UR QL STRIP: NORMAL
WBC # BLD AUTO: 7.2 10*3/MM3 (ref 3.4–10.8)

## 2021-08-16 PROCEDURE — 83690 ASSAY OF LIPASE: CPT | Performed by: EMERGENCY MEDICINE

## 2021-08-16 PROCEDURE — 96375 TX/PRO/DX INJ NEW DRUG ADDON: CPT

## 2021-08-16 PROCEDURE — 25010000002 MORPHINE PER 10 MG: Performed by: EMERGENCY MEDICINE

## 2021-08-16 PROCEDURE — 0 IOPAMIDOL PER 1 ML: Performed by: EMERGENCY MEDICINE

## 2021-08-16 PROCEDURE — 77067 SCR MAMMO BI INCL CAD: CPT

## 2021-08-16 PROCEDURE — 77080 DXA BONE DENSITY AXIAL: CPT

## 2021-08-16 PROCEDURE — 81003 URINALYSIS AUTO W/O SCOPE: CPT | Performed by: EMERGENCY MEDICINE

## 2021-08-16 PROCEDURE — 77063 BREAST TOMOSYNTHESIS BI: CPT

## 2021-08-16 PROCEDURE — 74177 CT ABD & PELVIS W/CONTRAST: CPT

## 2021-08-16 PROCEDURE — 85025 COMPLETE CBC W/AUTO DIFF WBC: CPT | Performed by: EMERGENCY MEDICINE

## 2021-08-16 PROCEDURE — 80053 COMPREHEN METABOLIC PANEL: CPT | Performed by: EMERGENCY MEDICINE

## 2021-08-16 PROCEDURE — 25010000002 ONDANSETRON PER 1 MG: Performed by: EMERGENCY MEDICINE

## 2021-08-16 PROCEDURE — 99283 EMERGENCY DEPT VISIT LOW MDM: CPT

## 2021-08-16 PROCEDURE — 96374 THER/PROPH/DIAG INJ IV PUSH: CPT

## 2021-08-16 RX ORDER — ONDANSETRON 2 MG/ML
4 INJECTION INTRAMUSCULAR; INTRAVENOUS ONCE
Status: COMPLETED | OUTPATIENT
Start: 2021-08-16 | End: 2021-08-16

## 2021-08-16 RX ORDER — AMOXICILLIN AND CLAVULANATE POTASSIUM 875; 125 MG/1; MG/1
1 TABLET, FILM COATED ORAL 2 TIMES DAILY
Qty: 20 TABLET | Refills: 0 | Status: SHIPPED | OUTPATIENT
Start: 2021-08-16 | End: 2021-09-21

## 2021-08-16 RX ORDER — HYDROCODONE BITARTRATE AND ACETAMINOPHEN 7.5; 325 MG/1; MG/1
1 TABLET ORAL EVERY 6 HOURS PRN
Qty: 12 TABLET | Refills: 0 | Status: SHIPPED | OUTPATIENT
Start: 2021-08-16 | End: 2021-12-10

## 2021-08-16 RX ORDER — ONDANSETRON 4 MG/1
4 TABLET, ORALLY DISINTEGRATING ORAL EVERY 8 HOURS PRN
Qty: 10 TABLET | Refills: 0 | Status: SHIPPED | OUTPATIENT
Start: 2021-08-16 | End: 2021-11-09

## 2021-08-16 RX ORDER — SODIUM CHLORIDE 0.9 % (FLUSH) 0.9 %
10 SYRINGE (ML) INJECTION AS NEEDED
Status: DISCONTINUED | OUTPATIENT
Start: 2021-08-16 | End: 2021-08-16 | Stop reason: HOSPADM

## 2021-08-16 RX ORDER — MORPHINE SULFATE 4 MG/ML
4 INJECTION, SOLUTION INTRAMUSCULAR; INTRAVENOUS ONCE
Status: COMPLETED | OUTPATIENT
Start: 2021-08-16 | End: 2021-08-16

## 2021-08-16 RX ADMIN — IOPAMIDOL 100 ML: 755 INJECTION, SOLUTION INTRAVENOUS at 19:13

## 2021-08-16 RX ADMIN — ONDANSETRON 4 MG: 2 INJECTION INTRAMUSCULAR; INTRAVENOUS at 17:58

## 2021-08-16 RX ADMIN — MORPHINE SULFATE 4 MG: 4 INJECTION INTRAVENOUS at 17:58

## 2021-08-16 NOTE — TELEPHONE ENCOUNTER
----- Message from Nunu Kingston MD sent at 8/16/2021  2:03 PM EDT -----  Mammogram stable /continue repeat screen 1 year

## 2021-08-16 NOTE — ED PROVIDER NOTES
Subjective   Chief complaint: Abdominal pain    61-year-old female presents with abdominal pain.  Patient states the pain is in the right side of the abdomen without radiation.  Pain has been intermittent for almost a week.  She has had diarrhea and nausea as well.  She denies any vomiting.  She has had no fever.  No known sick contacts.  She denies any alleviating or exacerbating factors.  Pain is described as moderate in intensity.      History provided by:  Patient      Review of Systems   Constitutional: Negative for fever.   HENT: Negative for congestion and sore throat.    Eyes: Negative for redness.   Respiratory: Negative for cough and shortness of breath.    Cardiovascular: Negative for chest pain.   Gastrointestinal: Positive for abdominal pain, diarrhea and nausea. Negative for vomiting.   Genitourinary: Negative for dysuria.   Musculoskeletal: Negative for back pain.   Skin: Negative for rash.   Neurological: Negative for dizziness and headaches.   Psychiatric/Behavioral: Negative for confusion.       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   • Gastroesophageal reflux disease 5/28/2013   • Hyperlipidemia    • MRSA infection 04/25/2020   • Myalgia and myositis 9/13/2011   • Osteoarthritis 10/30/2014   • Ovarian cyst 4/23/2013   • Ovarian enlargement, left    • Plantar fasciitis, right 02/20/2021    SEEN AT [facility]   • Seasonal allergies    • Sleep apnea 05/21/2021    NO CPAP       Allergies   Allergen Reactions   • Promethazine Anxiety and Hallucinations     AKA PHENERGAN, Delusions,  hallucinations       Past Surgical History:   Procedure Laterality Date   • APPENDECTOMY N/A 03/20/2020    LAPAROSCOPIC, DR. WILLIAM CHEADLE AT [facility]   • CARDIAC CATHETERIZATION Left 10/02/2009    No Intervention   • CHOLECYSTECTOMY N/A 02/08/2005    DR. CHRIS LOZA AT [facility]   • COLON RESECTION LEFT Left 2010    D/T RUPTURED DIVERTICULA, DR. DEA CROWE   • COLON RESECTION SMALL BOWEL N/A 04/07/2020    LAPAROSCOPY, MOBILIZATION OF RIGHT COLON, CONVERSION TO OPEN WITH RIGHT TRANSVERSE INCISION, EXTENSIVE LYSIS OF ADHESIONS, AND ILEOCECTOMY, DR. WILLIAM CHEADLE AT [facility]   • COLONOSCOPY N/A 03/2012    WITH ILEOSCOPY   • COLONOSCOPY N/A 11/09/2020    A FEW DIVERTICULA IN DESCENDING AND SIGMOID, MILD INTERNAL HEMORRHOIDS, RLQ ANASTAMOSIS, BX: REACTIVE ILEAL MUCOSA WITH MILD CHRONIC ILEITIS, RESCOPE IN 3 YRS, DR. AUSTEN NÚÑEZ AT [facility]   • COLONOSCOPY W/ POLYPECTOMY N/A 02/24/2017    BULBUOUS APPENDIX, POLYPS   • DIAGNOSTIC LAPAROSCOPY N/A 1993    FOR ENDOMETRIOSIS   • DIAGNOSTIC LAPAROSCOPY N/A 1992    FOR ENDOMETRIOSIS   • ENDOSCOPY AND COLONOSCOPY N/A 2009   • ERCP WITH SPHINCTEROTOMY/PAPILLOTOMY N/A 2005    WITH STENT   • FOOT SURGERY Right 2003   • HEMICOLECTOMY Left 06/13/2012    LAPAROSCOPIC LEFT AND SIGMOID HEMICOLECTOMY WITH TRANSVERSE RECTAL ANASTAMOSIS AND LAPAROSCOPIC TAKEDOWN OF SPLENIC FLEXURE, D/T DIVERTICULAR PERFORATION WITH ABSCESS, DR. ABY MCFARLAND AT [facility]   • HYSTERECTOMY N/A 1992    ROZINA WITH BSO       Family History   Problem Relation Age of Onset   • Cancer Mother    • Liver cancer Mother    • Basal cell carcinoma Mother    • Diabetes Mother    • Cancer Father    • Lung cancer Father    • Hypertension Father    • Celiac disease Father    • Heart disease Father    • Diverticulitis Sister    • Hypertension Sister    • Cancer Brother         BLADDER CANCER   • Cancer Maternal Aunt    • Ovarian cancer Maternal Aunt    • Cancer Paternal Aunt    • Breast cancer Paternal  "Aunt    • Cancer Paternal Uncle    • Stomach cancer Paternal Uncle    • Cancer Brother    • Stomach cancer Brother        Social History     Socioeconomic History   • Marital status:      Spouse name: Not on file   • Number of children: Not on file   • Years of education: Not on file   • Highest education level: Not on file   Tobacco Use   • Smoking status: Never Smoker   • Smokeless tobacco: Never Used   Vaping Use   • Vaping Use: Never used   Substance and Sexual Activity   • Alcohol use: Yes     Alcohol/week: 3.0 standard drinks     Types: 1 Glasses of wine, 1 Cans of beer, 1 Shots of liquor per week     Comment: MODERATE AMT   • Drug use: Never   • Sexual activity: Defer     Birth control/protection: Post-menopausal, Surgical       /87 (BP Location: Left arm, Patient Position: Sitting)   Pulse 72   Temp 98.6 °F (37 °C) (Oral)   Resp 20   Ht 165.1 cm (65\")   Wt 115 kg (253 lb 11.3 oz)   SpO2 98%   BMI 42.22 kg/m²     Objective   Physical Exam  Vitals and nursing note reviewed.   Constitutional:       Appearance: She is well-developed. She is obese.   HENT:      Head: Normocephalic and atraumatic.   Eyes:      Extraocular Movements: Extraocular movements intact.      Pupils: Pupils are equal, round, and reactive to light.   Cardiovascular:      Rate and Rhythm: Normal rate and regular rhythm.      Heart sounds: Normal heart sounds.   Pulmonary:      Effort: Pulmonary effort is normal. No respiratory distress.      Breath sounds: Normal breath sounds.   Abdominal:      General: Bowel sounds are normal.      Palpations: Abdomen is soft.      Tenderness: There is abdominal tenderness in the right upper quadrant and right lower quadrant. There is no guarding or rebound.   Musculoskeletal:         General: Normal range of motion.      Cervical back: Normal range of motion and neck supple.   Skin:     General: Skin is warm and dry.   Neurological:      General: No focal deficit present.      Mental " Status: She is alert and oriented to person, place, and time.         Procedures           ED Course      Results for orders placed or performed during the hospital encounter of 08/16/21   Comprehensive Metabolic Panel    Specimen: Blood   Result Value Ref Range    Glucose 98 65 - 99 mg/dL    BUN 10 8 - 23 mg/dL    Creatinine 0.80 0.57 - 1.00 mg/dL    Sodium 139 136 - 145 mmol/L    Potassium 3.8 3.5 - 5.2 mmol/L    Chloride 102 98 - 107 mmol/L    CO2 26.0 22.0 - 29.0 mmol/L    Calcium 9.5 8.6 - 10.5 mg/dL    Total Protein 7.8 6.0 - 8.5 g/dL    Albumin 4.10 3.50 - 5.20 g/dL    ALT (SGPT) 24 1 - 33 U/L    AST (SGOT) 23 1 - 32 U/L    Alkaline Phosphatase 115 39 - 117 U/L    Total Bilirubin 0.3 0.0 - 1.2 mg/dL    eGFR Non African Amer 73 >60 mL/min/1.73    Globulin 3.7 gm/dL    A/G Ratio 1.1 g/dL    BUN/Creatinine Ratio 12.5 7.0 - 25.0    Anion Gap 11.0 5.0 - 15.0 mmol/L   Lipase    Specimen: Blood   Result Value Ref Range    Lipase 22 13 - 60 U/L   Urinalysis With Culture If Indicated - Urine, Clean Catch    Specimen: Urine, Clean Catch   Result Value Ref Range    Color, UA Yellow Yellow, Straw    Appearance, UA Clear Clear    pH, UA 7.0 5.0 - 8.0    Specific Gravity, UA 1.016 1.005 - 1.030    Glucose, UA Negative Negative    Ketones, UA Negative Negative    Bilirubin, UA Negative Negative    Blood, UA Negative Negative    Protein, UA Negative Negative    Leuk Esterase, UA Negative Negative    Nitrite, UA Negative Negative    Urobilinogen, UA 1.0 E.U./dL 0.2 - 1.0 E.U./dL   CBC Auto Differential    Specimen: Blood   Result Value Ref Range    WBC 7.20 3.40 - 10.80 10*3/mm3    RBC 5.11 3.77 - 5.28 10*6/mm3    Hemoglobin 11.6 (L) 12.0 - 15.9 g/dL    Hematocrit 36.8 34.0 - 46.6 %    MCV 72.0 (L) 79.0 - 97.0 fL    MCH 22.6 (L) 26.6 - 33.0 pg    MCHC 31.4 (L) 31.5 - 35.7 g/dL    RDW 17.8 (H) 12.3 - 15.4 %    RDW-SD 45.1 37.0 - 54.0 fl    MPV 7.2 6.0 - 12.0 fL    Platelets 325 140 - 450 10*3/mm3    Neutrophil % 49.8 42.7 -  76.0 %    Lymphocyte % 34.6 19.6 - 45.3 %    Monocyte % 11.1 5.0 - 12.0 %    Eosinophil % 3.0 0.3 - 6.2 %    Basophil % 1.5 0.0 - 1.5 %    Neutrophils, Absolute 3.60 1.70 - 7.00 10*3/mm3    Lymphocytes, Absolute 2.50 0.70 - 3.10 10*3/mm3    Monocytes, Absolute 0.80 0.10 - 0.90 10*3/mm3    Eosinophils, Absolute 0.20 0.00 - 0.40 10*3/mm3    Basophils, Absolute 0.10 0.00 - 0.20 10*3/mm3    nRBC 0.1 0.0 - 0.2 /100 WBC   Gold Top - SST   Result Value Ref Range    Extra Tube Hold for add-ons.      CT Abdomen Pelvis With Contrast   Final Result       1. Nodular area of wall thickening seen within a small bowel loop seen   just inferior to the cecum and along the anastomotic site related to   previous partial colonic resection and appendectomy. Surrounding   stranding is present. This is worrisome for disease recurrence with   underlying mass though focal inflammation is also in the differential.   2. Adjacent gland appearing small bowel loop which also appears closely   opposed to the cecum and the suture line which demonstrates no evidence   of inflammatory change which is likely incidental. Formation of a   fistula given presence of 2 closely opposed small bowel loops along the   cecum is in the differential although unlikely. Colonoscopic correlation   is recommended.   3. Mildly prominent mesenteric nodes including an enlarged right lower   quadrant node which is indeterminate. Findings are likely reactive or   metastatic disease cannot be excluded if there is recurrence.   4. Ancillary findings as described above.       Electronically Signed By-Alda Giraldo MD On:8/16/2021 7:34 PM   This report was finalized on [id number] by  Alda Giraldo MD.                                               MDM   Patient had the above evaluation.  Results were discussed with the patient.  Labs were fairly unremarkable.  White blood cell count is normal.  CMP and lipase are normal.  Urinalysis shows no UTI.  CT of the abdomen and  pelvis shows evidence of recurrence of patient's cancer which she is aware of.  There is some surrounding stranding which could indicate some inflammation.  Patient will be given a prescription for Augmentin.  She will also be given a small prescription for Norco and Zofran.  She will follow-up with her oncologist.      Final diagnoses:   Abdominal pain, unspecified abdominal location       ED Disposition  ED Disposition     ED Disposition Condition Comment    Discharge Stable           Nunu Kingston MD  [address] [facility] [place] [facility]  [phone]    Call in 2 days           Medication List      New Prescriptions    amoxicillin-clavulanate 875-125 MG per tablet  Commonly known as: AUGMENTIN  Take 1 tablet by mouth 2 (Two) Times a Day.     HYDROcodone-acetaminophen 7.5-325 MG per tablet  Commonly known as: NORCO  Take 1 tablet by mouth Every 6 (Six) Hours As Needed for Moderate Pain .     ondansetron ODT 4 MG disintegrating tablet  Commonly known as: ZOFRAN-ODT  Place 1 tablet on the tongue Every 8 (Eight) Hours As Needed for Nausea or Vomiting.           Where to Get Your Medications      These medications were sent to [place] - [address]  - [phone]  - [phone] FX  [address] [place] [address]    Phone: [phone]   · amoxicillin-clavulanate 875-125 MG per tablet  · HYDROcodone-acetaminophen 7.5-325 MG per tablet  · ondansetron ODT 4 MG disintegrating tablet          Antwan Tavares MD  08/16/21 2022

## 2021-08-17 ENCOUNTER — TELEPHONE (OUTPATIENT)
Dept: FAMILY MEDICINE CLINIC | Facility: CLINIC | Age: 61
End: 2021-08-17

## 2021-08-17 NOTE — TELEPHONE ENCOUNTER
PATIENT CALLING STATING HER CANCER DRS NURSE WOULD LIKE TO GET ALL OF THE INFO FROM THE HOSPITAL VISIT FROM YESTERDAY FAXED TO HER.    LEONIE FOSS  F:[phone]    PLEASE ADVISE  [phone]

## 2021-08-17 NOTE — DISCHARGE INSTRUCTIONS
Follow-up with your primary doctor and oncologist.  Return to the emergency room for any new or worsening symptoms or if you have any other questions or concerns.  Take medications as prescribed.  Do not drive or drink alcohol while taking pain medication.

## 2021-08-23 NOTE — PATIENT INSTRUCTIONS
Health Maintenance Due   Topic Date Due   • COLORECTAL CANCER SCREENING  Never done   • Pneumococcal Vaccine 0-64 (1 of 2 - PPSV23) Never done   • TDAP/TD VACCINES (1 - Tdap) 09/12/2008   • ZOSTER VACCINE (1 of 2) Never done   • PAP SMEAR  Never done   • COVID-19 Vaccine (2 - Pfizer 2-dose series) 06/29/2021     Please help patient set up MYCHART    Please put in referral for [facility] for [facility].    Check blood pressure cuff for accuracy and send 10 blood pressures over 2 weeks.  Watch sodium, alcohol and weight

## 2021-08-24 ENCOUNTER — OFFICE VISIT (OUTPATIENT)
Dept: FAMILY MEDICINE CLINIC | Facility: CLINIC | Age: 61
End: 2021-08-24

## 2021-08-24 VITALS
OXYGEN SATURATION: 96 % | TEMPERATURE: 98 F | BODY MASS INDEX: 42.49 KG/M2 | WEIGHT: 255 LBS | RESPIRATION RATE: 17 BRPM | DIASTOLIC BLOOD PRESSURE: 72 MMHG | SYSTOLIC BLOOD PRESSURE: 151 MMHG | HEIGHT: 65 IN | HEART RATE: 66 BPM

## 2021-08-24 DIAGNOSIS — R10.9 ABDOMINAL PAIN, UNSPECIFIED ABDOMINAL LOCATION: Primary | ICD-10-CM

## 2021-08-24 DIAGNOSIS — Z12.11 SCREENING FOR COLON CANCER: ICD-10-CM

## 2021-08-24 DIAGNOSIS — Z12.4 SCREENING FOR CERVICAL CANCER: ICD-10-CM

## 2021-08-24 PROCEDURE — 99213 OFFICE O/P EST LOW 20 MIN: CPT | Performed by: PREVENTIVE MEDICINE

## 2021-08-24 NOTE — PROGRESS NOTES
"Subjective   Eve Garrett is a 61 y.o. female presents for   Chief Complaint   Patient presents with   • Abdominal Pain   Patient was seen again in emergency room approximately a week ago for right lower quadrant discomfort.  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  She had the surgery at [facility] and that surgeon states that there was nothing else he could do for her.  She was then referred to  oncology surgeon who ordered some tumor markers and told her to return in 3 months.  Patient is quite concerned that more surgery needs to be done and she wishes to go to [place] and I think that is a reasonable plan.  Since patient has been on antibiotics she has had no fever or chills bowel movements are present in fact everything seems to move quite quickly through her colon she has had nausea that is controlled with Zofran but no vomiting weight is the same as it was several months ago.  Patient is having no dysuria and abdominal pain in the right lower quadrant is present all the time.    Health Maintenance Due   Topic Date Due   • COLORECTAL CANCER SCREENING  Never done   • Pneumococcal Vaccine 0-64 (1 of 2 - PPSV23) Never done   • TDAP/TD VACCINES (1 - Tdap) 09/12/2008   • ZOSTER VACCINE (1 of 2) Never done   • PAP SMEAR  Never done       History of Present Illness     Vitals:    08/24/21 1300 08/24/21 1302   BP: 167/70 151/72   BP Location: Right arm Left arm   Pulse: 66    Resp: 17    Temp: 98 °F (36.7 °C)    SpO2: 96%    Weight: 116 kg (255 lb)    Height: 165.1 cm (65\")      Body mass index is 42.43 kg/m².    Current Outpatient Medications on File Prior to Visit   Medication Sig Dispense Refill   • albuterol sulfate  (90 Base) MCG/ACT inhaler Inhale 2 puffs.     • amoxicillin-clavulanate (AUGMENTIN) 875-125 MG per tablet Take 1 tablet by mouth 2 (Two) " Times a Day. 20 tablet 0   • atorvastatin (LIPITOR) 20 MG tablet Take 20 mg by mouth Daily.     • HYDROcodone-acetaminophen (NORCO) 7.5-325 MG per tablet Take 1 tablet by mouth Every 6 (Six) Hours As Needed for Moderate Pain . 12 tablet 0   • lisinopril (PRINIVIL,ZESTRIL) 20 MG tablet Take 1 tablet by mouth Daily. 90 tablet 3   • metoprolol tartrate (LOPRESSOR) 50 MG tablet Take 50 mg by mouth.     • multivitamin (THERAGRAN) tablet tablet Take 1 tablet by mouth Daily.     • Omega-3 Fatty Acids (fish oil) 1000 MG capsule capsule Take 1,000 mg by mouth Daily.     • omeprazole (priLOSEC) 40 MG capsule Take 40 mg by mouth Daily.     • ondansetron ODT (ZOFRAN-ODT) 4 MG disintegrating tablet Place 1 tablet on the tongue Every 8 (Eight) Hours As Needed for Nausea or Vomiting. 10 tablet 0   • Tiotropium Bromide Monohydrate (Spiriva Respimat) 1.25 MCG/ACT aerosol solution inhaler Inhale 2 puffs Daily. 3 each 3   • Triamcinolone Acetonide (NASACORT AQ NA) into the nostril(s) as directed by provider.     • Umeclidinium Bromide (Incruse Ellipta) 62.5 MCG/INH aerosol powder  Inhale 1 puff Daily. 1 each 1     No current facility-administered medications on file prior to visit.       The following portions of the patient's history were reviewed and updated as appropriate: allergies, current medications, past family history, past medical history, past social history, past surgical history and problem list.    Review of Systems   Gastrointestinal: Positive for abdominal pain, diarrhea and nausea.       Objective   Physical Exam  Vitals reviewed.   Constitutional:       General: She is not in acute distress.     Appearance: Normal appearance. She is well-developed. She is not ill-appearing or toxic-appearing.   HENT:      Head: Normocephalic and atraumatic.      Nose: Nose normal.   Eyes:      Extraocular Movements: Extraocular movements intact.      Conjunctiva/sclera: Conjunctivae normal.      Pupils: Pupils are equal, round, and  reactive to light.   Cardiovascular:      Rate and Rhythm: Normal rate and regular rhythm.      Heart sounds: Normal heart sounds.   Pulmonary:      Effort: Pulmonary effort is normal.      Breath sounds: Normal breath sounds.   Abdominal:      General: Bowel sounds are normal. There is no distension.      Palpations: Abdomen is soft. There is no mass.      Tenderness: There is abdominal tenderness. There is no rebound.   Musculoskeletal:         General: Normal range of motion.      Cervical back: Neck supple.   Skin:     General: Skin is warm.   Neurological:      General: No focal deficit present.      Mental Status: She is alert and oriented to person, place, and time.   Psychiatric:         Mood and Affect: Mood normal.         Behavior: Behavior normal.       PHQ-9 Total Score:      Assessment/Plan   Diagnoses and all orders for this visit:    1. Abdominal pain, unspecified abdominal location (Primary)  Comments:  Right lower quadrant pain is somewhat better but still present.  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  Orders:  -     Ambulatory Referral to General Surgery    2. Screening for colon cancer    3. Screening for cervical cancer        Patient Instructions     Health Maintenance Due   Topic Date Due   • COLORECTAL CANCER SCREENING  Never done   • Pneumococcal Vaccine 0-64 (1 of 2 - PPSV23) Never done   • TDAP/TD VACCINES (1 - Tdap) 09/12/2008   • ZOSTER VACCINE (1 of 2) Never done   • PAP SMEAR  Never done   • COVID-19 Vaccine (2 - Pfizer 2-dose series) 06/29/2021     Please help patient set up MYCHART    Please put in referral for [facility] for [facility].    Check blood pressure cuff for accuracy and send 10 blood pressures over 2 weeks.  Watch sodium, alcohol and weight

## 2021-09-02 ENCOUNTER — OFFICE (AMBULATORY)
Dept: URBAN - METROPOLITAN AREA CLINIC 64 | Facility: CLINIC | Age: 61
End: 2021-09-02

## 2021-09-02 VITALS
DIASTOLIC BLOOD PRESSURE: 81 MMHG | HEART RATE: 80 BPM | HEIGHT: 67 IN | SYSTOLIC BLOOD PRESSURE: 143 MMHG | WEIGHT: 258 LBS

## 2021-09-02 DIAGNOSIS — R63.4 ABNORMAL WEIGHT LOSS: ICD-10-CM

## 2021-09-02 DIAGNOSIS — D49.0 NEOPLASM OF UNSPECIFIED BEHAVIOR OF DIGESTIVE SYSTEM: ICD-10-CM

## 2021-09-02 DIAGNOSIS — K21.9 GASTRO-ESOPHAGEAL REFLUX DISEASE WITHOUT ESOPHAGITIS: ICD-10-CM

## 2021-09-02 DIAGNOSIS — R10.31 RIGHT LOWER QUADRANT PAIN: ICD-10-CM

## 2021-09-02 PROCEDURE — 99213 OFFICE O/P EST LOW 20 MIN: CPT | Performed by: INTERNAL MEDICINE

## 2021-09-03 ENCOUNTER — TELEPHONE (OUTPATIENT)
Dept: FAMILY MEDICINE CLINIC | Facility: CLINIC | Age: 61
End: 2021-09-03

## 2021-09-03 NOTE — TELEPHONE ENCOUNTER
----- Message from Nunu Kingston MD sent at 9/3/2021  8:09 AM EDT -----  Please call patient and see that she has made contact with  or [place] about what [facility] believes is carcinoid tumor.

## 2021-09-03 NOTE — TELEPHONE ENCOUNTER
HUB TO READ    Please call patient and see that she has made contact with  or [place] about what [facility] believes is carcinoid tumor.

## 2021-09-07 ENCOUNTER — OFFICE VISIT (OUTPATIENT)
Dept: SLEEP MEDICINE | Facility: CLINIC | Age: 61
End: 2021-09-07

## 2021-09-07 VITALS
OXYGEN SATURATION: 96 % | DIASTOLIC BLOOD PRESSURE: 80 MMHG | BODY MASS INDEX: 42.49 KG/M2 | HEIGHT: 65 IN | WEIGHT: 255 LBS | SYSTOLIC BLOOD PRESSURE: 150 MMHG

## 2021-09-07 DIAGNOSIS — R06.83 SNORING: ICD-10-CM

## 2021-09-07 DIAGNOSIS — G47.10 HYPERSOMNIA: ICD-10-CM

## 2021-09-07 DIAGNOSIS — G47.30 OBSERVED SLEEP APNEA: Primary | ICD-10-CM

## 2021-09-07 DIAGNOSIS — G47.8 NON-RESTORATIVE SLEEP: ICD-10-CM

## 2021-09-07 DIAGNOSIS — E66.01 CLASS 3 SEVERE OBESITY DUE TO EXCESS CALORIES WITHOUT SERIOUS COMORBIDITY WITH BODY MASS INDEX (BMI) OF 40.0 TO 44.9 IN ADULT (HCC): ICD-10-CM

## 2021-09-07 PROCEDURE — 99244 OFF/OP CNSLTJ NEW/EST MOD 40: CPT | Performed by: INTERNAL MEDICINE

## 2021-09-07 PROCEDURE — G0463 HOSPITAL OUTPT CLINIC VISIT: HCPCS

## 2021-09-07 RX ORDER — ZOLPIDEM TARTRATE 5 MG/1
5 TABLET ORAL TAKE AS DIRECTED
Qty: 2 TABLET | Refills: 0 | Status: SHIPPED | OUTPATIENT
Start: 2021-09-07 | End: 2021-11-09

## 2021-09-07 RX ORDER — OMEPRAZOLE 40 MG/1
40 CAPSULE, DELAYED RELEASE ORAL DAILY
Qty: 90 CAPSULE | Refills: 1 | Status: SHIPPED | OUTPATIENT
Start: 2021-09-07 | End: 2022-03-23 | Stop reason: SDUPTHER

## 2021-09-07 NOTE — PROGRESS NOTES
[facility]  [address] [place] [id number]  Phone   Fax       Eve Garrett  1960  61 y.o.  female      Referring physician/provider and PCP Nunu Kingston MD    Type of service: Initial Sleep Medicine Consult.  Date of service: 9/7/2021      Chief Complaint   Patient presents with   • Witnessed Apnea   • Snoring   • Fatigue   • Non-restorative Sleep   • Daytime Sleepiness   • Morning Headaches   • Obesity       History of present illness;  Thank you for asking to see Eve Garrett, 61 y.o.. The patient was seen today on 9/7/2021 at [facility].  The patient presents today with symptoms of snoring, nonrestorative sleep and witnessed apneas. The symptoms are present for more than 10 years and they are persistent in nature.  The snoring is present in all positions and it is loud.  Has  history of prior sleep evaluation and sleep studies more than 10 years ago but never use the CPAP. Patient has no  prior surgery namely tonsillectomy, nasal surgery and UPPP.     Patient gives the following sleep history.  Sleep schedule:  Bedtime: 10 PM  Wake time: 6:15 AM  Normally takes about 15-20 minutes to fall asleep  Average hours of sleep 6-7  Number of naps per day none  Symptoms  In addition to snoring, nonrestorative sleep and witnessed apneas patient gives the following associated symptoms.  Have you ever awakened gasping for breath, coughing, choking: Yes   Change in weight,  Yes gained 100 pounds  Morning headaches  Yes   Awaken with a sore throat or dry mouth  Yes   Leg jerking at night:  Yes   Crawly feeling/urge sensation to move in the legs: No   Teeth grinding:Yes   Have you ever awakened at night with a sour taste or burning sensation in your chest:  Yes   Do you have muscle weakness with laughing or anger or sleep paralysis:  No   Have you ever felt paralyzed while going to sleep or waking up:  No   Sleepwalking, nightmares, No  "  Nocturia (urination at night): 5-6 times per night  Memory Problem:Yes     MEDICAL CONDITIONS (PMH)  1. Hypertension  2. Asthma  3. 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 Diabetes mellitus  2. Heart disease  3. Hypertension  4. Lung cancer with father  5. Liver cancer with mother  6. Bladder cancer with brother    Medications: reviewed    Review of systems:  Sleep: Positve for snoring,witnessed apnea and daytime excessive sleepiness with [place] Sleepiness Scale of     Kidney/ Bladder  Difficulty In Urination: negative  Bed Wetting: negative  Frequent Urination: positive  HEENT  Recurrent Nose Bleeds: positive  Ear pain: negative  Sores In Mouth: positive  Persistent Hoarseness: positive  Nasal Congestion: positive  Post Nasal Drip: negative  Musculoskeletal:  Neck Pain: positive  Temporomandibular Joint Pain: negative  General:  Fever: negative  Fatigue: positive  Cardiovascular:  Irregular Heartbeat: negative  Swollen Ankles Or Legs: negative  Respiratory:  Shortness Of Breath: negative  Wheezing: negative  Neuro/Paych:  Fainting Spells: negative  Dizziness: negative  Anxiety: negative  Depression: negative  Gastrointestinal:  Problem Swallowing: negative  Frequent Heartburn: negative  Abdominal Bloating: negative  Skin:  Rash: negative  Change In Nails: negative  Endocrine:  Excessive Thirst: negative  Always Too Cold: negative  Always Too Warm: negative  Hem/Lymphatic:  Swollen Glands: negative  Burses/ Bleeds Easily: negative    Physical exam:  CONSTITUTINONAL:  Vitals:    09/07/21 0900 09/07/21 0927 09/07/21 0930   BP: 150/80  150/80   SpO2: 96%  96%   Weight: 83.5 kg (184 lb) 116 kg (255 lb) 116 kg (255 lb)   Height: 177.8 cm (70\") 165.1 cm (65\") 165.1 cm (65\")    Body mass index is " 42.43 kg/m².   HEAD: atraumatic, normocephalic   EYES:pupils are round, equal and reacting to light and accommodation, conjunctiva normal  NOSE:no nasal septal defects, nasal passages are clear, no nasal polyps,   THROAT: tonsils are not enlarged, tongue normal size, oral airway Mallampati class 4  NECK: , trachea is in the midline, thyroid not enlarged  RESPIRATORY SYSTEM: Breath sounds are normal, there are no wheezes  CARDIOVASULAR SYSTEM: Heart sounds are regular rhythm and normal rate, there are no murmurs or thrills, no edema  GASTROINTESTINAL: Soft and non-tender,liver not enlarged, no evidence of ascites  MUSCULOSKELETAL SYSTEM: Full range of motion's in all the 4 extremities, neck movement not restricted, temporomandibular joint movement normal and no tenderness  SKIN: Warm and moist, no cyanosis, no clubbing,  NEUROLOGICAL SYSTEM: Oriented x 3, no gross neurological defects, No speech defect, gait is normal  PSYCHIATRIC SYSTEM: Mood is normal, thought content is normal    Office notes from care team reviewed. Office note dated August 24, 2021,reviewed  Labs reviewed.  TSH Results:  TSH    TSH 6/17/21   TSH 3.680                 Assessment and plan:  · Witnessed apneas,(R06.81) patient's symptoms and examination is consistent with sleep apnea (G47.30). I have talked to the patient about the signs and symptoms of sleep apnea. In addition, I have also discussed pathophysiology of sleep apnea.  I also discussed the complications of untreated sleep apnea including effects on hypertension, diabetes mellitus and nonrestorative sleep with hypersomnia which can increase risk for motor vehicle accidents.  Untreated sleep apnea is also a risk factor for development of atrial fibrillation, pulmonary hypertension and stroke.  Discussed in detail of various testing methods including home-based and lab based sleep studies.  Based on history and physical examination and other comorbidities the most appropriate study is  split-night study.  The order for the sleep study is placed in [facility].  The test will be scheduled after approval from insurance. Treatment and management will be discussed after the test is completed.  Patient was given opportunity to ask questions and all the questions were answered. A prescription for zolpidem has been given for sleep test for 2 tablets to improve sleep efficiency  · Snoring (R06.83), snoring is the sound created by turbulent airflow vibrating upper airway soft tissue due to limitation of inspiratory airflow. I have also discussed factors affecting snoring including sleep deprivation, sleeping on the back and alcohol ingestion. To minimize snoring, patient is advised to have adequate sleep, sleep on the side and avoid alcohol and sedative medications before bedtime  · Daytime excessive sleepiness .  It was assessed with [place] Sleepiness Scale of  .8  There are many causes for daytime excessive sleepiness including sleep depression, shiftwork syndrome, depression and other medical disorders including heart, kidney and liver failure.  The most serious cause of excessive sleepiness is due to neurological conditions like narcolepsy/cataplexy.  But the most common cause of excessive sleepiness is due to sleep apnea with frequent awakenings during sleep time.  I have discussed safety of driving and to remain vigilant while driving.  · Obesity class 3, patient's BMI is Body mass index is 42.43 kg/m².. I have discussed the relationship between weight and sleep apnea.There is direct correlation between weight and severity of sleep apnea.  Weight reduction is encouraged, as it is going to reduce the severity of sleep apnea. I have also discussed with the patient diet and exercise to achieve ideal body weight.  · Poor memory,    · Hypertension.  Essential hypertension is highly correlated with sleep a    I have also discussed with the patient the following  • Sleep hygiene: Maintaining a regular bedtime and  wake time, not to watch television or work in bed, limit caffeine-containing beverages before bed time and avoid naps during the day  • Adequate amount of sleep.  Generally most people needs about 7 to 8 hours of sleep.    Return for 31 to 90 days after PAP setup with down load..  Patient's questions were answered      I once again thank you for asking me to see this patient in consultation and I have forwarded my opinion and treatment plan.  Please do not hesitate to call me if you have any questions.     Kisha Bagley MD  Sleep Medicine  Medical Director, [facility]  9/7/2021 ,

## 2021-09-07 NOTE — TELEPHONE ENCOUNTER
Faxed Dr Ellis records from 9/2/21 along with CT abd/pelvis to [facility] [phone]

## 2021-09-16 PROBLEM — R93.3 ABNORMAL FINDINGS ON DIAGNOSTIC IMAGING -- CT SCAN ABDOMEN: Status: ACTIVE | Noted: 2020-11-09

## 2021-09-20 NOTE — PATIENT INSTRUCTIONS
Health Maintenance Due   Topic Date Due   • COLORECTAL CANCER SCREENING  Never done   • Pneumococcal Vaccine 0-64 (1 of 2 - PPSV23) Never done   • TDAP/TD VACCINES (1 - Tdap) 09/12/2008   • ZOSTER VACCINE (1 of 2) Never done   • PAP SMEAR  Never done     12 hour fast for labs-Check blood pressure cuff for accuracy and send 10 blood pressures over 2 weeks.  Watch sodium, alcohol and weight

## 2021-09-21 ENCOUNTER — OFFICE VISIT (OUTPATIENT)
Dept: FAMILY MEDICINE CLINIC | Facility: CLINIC | Age: 61
End: 2021-09-21

## 2021-09-21 VITALS
OXYGEN SATURATION: 96 % | HEART RATE: 93 BPM | DIASTOLIC BLOOD PRESSURE: 78 MMHG | BODY MASS INDEX: 43.28 KG/M2 | TEMPERATURE: 98.4 F | HEIGHT: 65 IN | SYSTOLIC BLOOD PRESSURE: 159 MMHG | WEIGHT: 259.8 LBS

## 2021-09-21 DIAGNOSIS — E78.5 HYPERLIPIDEMIA, UNSPECIFIED HYPERLIPIDEMIA TYPE: ICD-10-CM

## 2021-09-21 DIAGNOSIS — I10 ESSENTIAL HYPERTENSION: ICD-10-CM

## 2021-09-21 DIAGNOSIS — E66.01 CLASS 3 SEVERE OBESITY DUE TO EXCESS CALORIES WITHOUT SERIOUS COMORBIDITY WITH BODY MASS INDEX (BMI) OF 40.0 TO 44.9 IN ADULT (HCC): ICD-10-CM

## 2021-09-21 DIAGNOSIS — Z12.4 SCREENING FOR CERVICAL CANCER: Primary | ICD-10-CM

## 2021-09-21 DIAGNOSIS — F32.A DEPRESSION, UNSPECIFIED DEPRESSION TYPE: ICD-10-CM

## 2021-09-21 DIAGNOSIS — D3A.020 CARCINOID TUMOR OF APPENDIX, UNSPECIFIED WHETHER MALIGNANT: ICD-10-CM

## 2021-09-21 DIAGNOSIS — E07.9 DISEASE OF THYROID GLAND: ICD-10-CM

## 2021-09-21 DIAGNOSIS — G47.30 OBSERVED SLEEP APNEA: ICD-10-CM

## 2021-09-21 DIAGNOSIS — Z12.11 SCREENING FOR COLON CANCER: ICD-10-CM

## 2021-09-21 DIAGNOSIS — K21.9 GASTROESOPHAGEAL REFLUX DISEASE WITHOUT ESOPHAGITIS: ICD-10-CM

## 2021-09-21 DIAGNOSIS — J45.909 ASTHMA, UNSPECIFIED ASTHMA SEVERITY, UNSPECIFIED WHETHER COMPLICATED, UNSPECIFIED WHETHER PERSISTENT: ICD-10-CM

## 2021-09-21 PROCEDURE — 90750 HZV VACC RECOMBINANT IM: CPT | Performed by: PREVENTIVE MEDICINE

## 2021-09-21 PROCEDURE — 90471 IMMUNIZATION ADMIN: CPT | Performed by: PREVENTIVE MEDICINE

## 2021-09-21 PROCEDURE — 90732 PPSV23 VACC 2 YRS+ SUBQ/IM: CPT | Performed by: PREVENTIVE MEDICINE

## 2021-09-21 PROCEDURE — 99213 OFFICE O/P EST LOW 20 MIN: CPT | Performed by: PREVENTIVE MEDICINE

## 2021-09-21 PROCEDURE — 90472 IMMUNIZATION ADMIN EACH ADD: CPT | Performed by: PREVENTIVE MEDICINE

## 2021-09-21 PROCEDURE — 90686 IIV4 VACC NO PRSV 0.5 ML IM: CPT | Performed by: PREVENTIVE MEDICINE

## 2021-09-21 NOTE — PROGRESS NOTES
"Subjective   Eve Garrett is a 61 y.o. female presents for   Chief Complaint   Patient presents with   • Hypertension   • Hyperlipidemia   • Heartburn   Patient presents today for follow-up of multiple chronic health conditions.  Most of which are stable with the exception of essential hypertension she is not controlled here so she will home monitor and send us the results.  She is going to meals in October to get her right lower quadrant carcinoid tumor versus cancer of the appendix checked and treatment delineated.  Patient has had her Covid vaccination.    Health Maintenance Due   Topic Date Due   • COLORECTAL CANCER SCREENING  Never done   • TDAP/TD VACCINES (1 - Tdap) 09/12/2008       History of Present Illness     Vitals:    09/21/21 0916 09/21/21 0919 09/21/21 0938   BP: 172/82 137/85 159/78   BP Location: Left arm Right arm Right leg   Patient Position: Sitting Sitting Sitting   Cuff Size: Adult Adult Large Adult   Pulse: 93     Temp: 98.4 °F (36.9 °C)     TempSrc: Temporal     SpO2: 96%     Weight: 118 kg (259 lb 12.8 oz)     Height: 165.1 cm (65\")       Body mass index is 43.23 kg/m².    Current Outpatient Medications on File Prior to Visit   Medication Sig Dispense Refill   • albuterol sulfate  (90 Base) MCG/ACT inhaler Inhale 2 puffs.     • atorvastatin (LIPITOR) 20 MG tablet Take 20 mg by mouth Daily.     • lisinopril (PRINIVIL,ZESTRIL) 20 MG tablet Take 1 tablet by mouth Daily. 90 tablet 3   • metoprolol tartrate (LOPRESSOR) 50 MG tablet Take 50 mg by mouth.     • multivitamin (THERAGRAN) tablet tablet Take 1 tablet by mouth Daily.     • Omega-3 Fatty Acids (fish oil) 1000 MG capsule capsule Take 1,000 mg by mouth Daily.     • omeprazole (priLOSEC) 40 MG capsule Take 1 capsule by mouth Daily. 90 capsule 1   • Tiotropium Bromide Monohydrate (Spiriva Respimat) 1.25 MCG/ACT aerosol solution inhaler Inhale 2 puffs Daily. 3 each 3   • Triamcinolone Acetonide (NASACORT AQ NA) into the nostril(s) as " directed by provider.     • Umeclidinium Bromide (Incruse Ellipta) 62.5 MCG/INH aerosol powder  Inhale 1 puff Daily. 1 each 1   • zolpidem (AMBIEN) 5 MG tablet Take 1 tablet by mouth Take As Directed for 2 doses. Bring the medication to the sleep lab. DO NOT USE AT HOME 2 tablet 0   • HYDROcodone-acetaminophen (NORCO) 7.5-325 MG per tablet Take 1 tablet by mouth Every 6 (Six) Hours As Needed for Moderate Pain . 12 tablet 0   • ondansetron ODT (ZOFRAN-ODT) 4 MG disintegrating tablet Place 1 tablet on the tongue Every 8 (Eight) Hours As Needed for Nausea or Vomiting. 10 tablet 0   • [DISCONTINUED] amoxicillin-clavulanate (AUGMENTIN) 875-125 MG per tablet Take 1 tablet by mouth 2 (Two) Times a Day. 20 tablet 0     No current facility-administered medications on file prior to visit.       The following portions of the patient's history were reviewed and updated as appropriate: allergies, current medications, past family history, past medical history, past social history, past surgical history and problem list.    Review of Systems   Gastrointestinal: Positive for abdominal pain, diarrhea, nausea, GERD and indigestion.       Objective   Physical Exam  Vitals reviewed.   Constitutional:       General: She is not in acute distress.     Appearance: She is well-developed. She is obese. She is not ill-appearing or toxic-appearing.   HENT:      Head: Normocephalic and atraumatic.      Right Ear: Tympanic membrane, ear canal and external ear normal.      Left Ear: Tympanic membrane, ear canal and external ear normal.      Nose: Nose normal.   Eyes:      Extraocular Movements: Extraocular movements intact.      Conjunctiva/sclera: Conjunctivae normal.      Pupils: Pupils are equal, round, and reactive to light.   Cardiovascular:      Rate and Rhythm: Normal rate and regular rhythm.      Heart sounds: Normal heart sounds.   Pulmonary:      Effort: Pulmonary effort is normal.      Breath sounds: Normal breath sounds.   Abdominal:       General: Bowel sounds are normal.      Palpations: Abdomen is soft. There is mass.      Tenderness: There is abdominal tenderness. There is no guarding or rebound.   Musculoskeletal:         General: Normal range of motion.      Cervical back: Neck supple.   Skin:     General: Skin is warm.   Neurological:      General: No focal deficit present.      Mental Status: She is alert and oriented to person, place, and time.   Psychiatric:         Mood and Affect: Mood normal.         Behavior: Behavior normal.       PHQ-9 Total Score:      Assessment/Plan   Diagnoses and all orders for this visit:    1. Screening for cervical cancer (Primary)  Comments:  Hysterectomy for endometriosis    2. Screening for colon cancer  Comments:  Will complete when she returns he may also has not been done.    3. Disease of thyroid gland  Comments:  TSH pending  Orders:  -     TSH    4. Carcinoid tumor of appendix, unspecified whether malignant  Comments:  Follow-up [facility] in October.    5. Observed sleep apnea  Comments:  Study to be done after [place]    6. Hyperlipidemia, unspecified hyperlipidemia type  Comments:  Trying to eat less sat fat  Orders:  -     Lipid Panel    7. Essential hypertension  Comments:  Blood pressure is elevated here today she will home monitor and call us the results.  Orders:  -     CBC Auto Differential  -     Comprehensive Metabolic Panel    8. Gastroesophageal reflux disease without esophagitis  Comments:  Depends on spicey  Orders:  -     Magnesium    9. Depression, unspecified depression type  Comments:  Controlled  No HI or SI  Orders:  -     Vitamin B12    10. Asthma, unspecified asthma severity, unspecified whether complicated, unspecified whether persistent  Comments:  Sti.ll winded with steps    11. Class 3 severe obesity due to excess calories without serious comorbidity with body mass index (BMI) of 40.0 to 44.9 in adult (CMS/[facility])    Other orders  -     Cancel: Ambulatory Referral to Gynecology  -      Pneumococcal Polysaccharide Vaccine 23-Valent Greater Than or Equal To 1yo Subcutaneous / IM  -     FluLaval >6 Months (0627-7263)  -     Shingrix Vaccine        Patient Instructions     Health Maintenance Due   Topic Date Due   • COLORECTAL CANCER SCREENING  Never done   • Pneumococcal Vaccine 0-64 (1 of 2 - PPSV23) Never done   • TDAP/TD VACCINES (1 - Tdap) 09/12/2008   • ZOSTER VACCINE (1 of 2) Never done   • PAP SMEAR  Never done     12 hour fast for labs-Check blood pressure cuff for accuracy and send 10 blood pressures over 2 weeks.  Watch sodium, alcohol and weight

## 2021-09-21 NOTE — PROGRESS NOTES
Injection  Injection performed in right and left deltoid by Renetta Valentino. Patient tolerated the procedure well without complications.  09/21/21   Renetta Valentino

## 2021-09-22 ENCOUNTER — CLINICAL SUPPORT (OUTPATIENT)
Dept: FAMILY MEDICINE CLINIC | Facility: CLINIC | Age: 61
End: 2021-09-22

## 2021-09-22 DIAGNOSIS — J45.909 ASTHMA, UNSPECIFIED ASTHMA SEVERITY, UNSPECIFIED WHETHER COMPLICATED, UNSPECIFIED WHETHER PERSISTENT: ICD-10-CM

## 2021-09-22 DIAGNOSIS — D64.9 ANEMIA, UNSPECIFIED TYPE: ICD-10-CM

## 2021-09-22 DIAGNOSIS — E66.01 CLASS 3 SEVERE OBESITY DUE TO EXCESS CALORIES WITHOUT SERIOUS COMORBIDITY WITH BODY MASS INDEX (BMI) OF 40.0 TO 44.9 IN ADULT (HCC): ICD-10-CM

## 2021-09-22 PROCEDURE — 80061 LIPID PANEL: CPT | Performed by: PREVENTIVE MEDICINE

## 2021-09-22 PROCEDURE — 85007 BL SMEAR W/DIFF WBC COUNT: CPT | Performed by: PREVENTIVE MEDICINE

## 2021-09-22 PROCEDURE — 83735 ASSAY OF MAGNESIUM: CPT | Performed by: PREVENTIVE MEDICINE

## 2021-09-22 PROCEDURE — 36415 COLL VENOUS BLD VENIPUNCTURE: CPT | Performed by: PREVENTIVE MEDICINE

## 2021-09-22 PROCEDURE — 84443 ASSAY THYROID STIM HORMONE: CPT | Performed by: PREVENTIVE MEDICINE

## 2021-09-22 PROCEDURE — 80053 COMPREHEN METABOLIC PANEL: CPT | Performed by: PREVENTIVE MEDICINE

## 2021-09-22 PROCEDURE — 82607 VITAMIN B-12: CPT | Performed by: PREVENTIVE MEDICINE

## 2021-09-22 PROCEDURE — 85025 COMPLETE CBC W/AUTO DIFF WBC: CPT | Performed by: PREVENTIVE MEDICINE

## 2021-09-22 NOTE — PROGRESS NOTES
Venipuncture Blood Specimen Collection  Venipuncture performed in right arm by Margaret Flaherty MA with good hemostasis. Patient tolerated the procedure well without complications.   09/22/21   PANCHO Aldridge MD

## 2021-09-23 ENCOUNTER — TELEPHONE (OUTPATIENT)
Dept: FAMILY MEDICINE CLINIC | Facility: CLINIC | Age: 61
End: 2021-09-23

## 2021-09-23 LAB
ALBUMIN SERPL-MCNC: 4 G/DL (ref 3.5–5.2)
ALBUMIN/GLOB SERPL: 1.3 G/DL
ALP SERPL-CCNC: 102 U/L (ref 39–117)
ALT SERPL W P-5'-P-CCNC: 29 U/L (ref 1–33)
ANION GAP SERPL CALCULATED.3IONS-SCNC: 10.1 MMOL/L (ref 5–15)
ANISOCYTOSIS BLD QL: ABNORMAL
AST SERPL-CCNC: 21 U/L (ref 1–32)
BASOPHILS # BLD MANUAL: 0.15 10*3/MM3 (ref 0–0.2)
BASOPHILS NFR BLD AUTO: 2 % (ref 0–1.5)
BILIRUB SERPL-MCNC: 0.3 MG/DL (ref 0–1.2)
BUN SERPL-MCNC: 9 MG/DL (ref 8–23)
BUN/CREAT SERPL: 11.1 (ref 7–25)
CALCIUM SPEC-SCNC: 9.5 MG/DL (ref 8.6–10.5)
CHLORIDE SERPL-SCNC: 108 MMOL/L (ref 98–107)
CHOLEST SERPL-MCNC: 131 MG/DL (ref 0–200)
CO2 SERPL-SCNC: 22.9 MMOL/L (ref 22–29)
CREAT SERPL-MCNC: 0.81 MG/DL (ref 0.57–1)
DEPRECATED RDW RBC AUTO: 42.6 FL (ref 37–54)
EOSINOPHIL # BLD MANUAL: 0.31 10*3/MM3 (ref 0–0.4)
EOSINOPHIL NFR BLD MANUAL: 4 % (ref 0.3–6.2)
ERYTHROCYTE [DISTWIDTH] IN BLOOD BY AUTOMATED COUNT: 15.8 % (ref 12.3–15.4)
GFR SERPL CREATININE-BSD FRML MDRD: 72 ML/MIN/1.73
GLOBULIN UR ELPH-MCNC: 3 GM/DL
GLUCOSE SERPL-MCNC: 120 MG/DL (ref 65–99)
HCT VFR BLD AUTO: 35.9 % (ref 34–46.6)
HDLC SERPL-MCNC: 35 MG/DL (ref 40–60)
HGB BLD-MCNC: 11 G/DL (ref 12–15.9)
LDLC SERPL CALC-MCNC: 69 MG/DL (ref 0–100)
LDLC/HDLC SERPL: 1.85 {RATIO}
LYMPHOCYTES # BLD MANUAL: 1.17 10*3/MM3 (ref 0.7–3.1)
LYMPHOCYTES NFR BLD MANUAL: 15.2 % (ref 19.6–45.3)
LYMPHOCYTES NFR BLD MANUAL: 9.1 % (ref 5–12)
MAGNESIUM SERPL-MCNC: 2.1 MG/DL (ref 1.6–2.4)
MCH RBC QN AUTO: 22.9 PG (ref 26.6–33)
MCHC RBC AUTO-ENTMCNC: 30.6 G/DL (ref 31.5–35.7)
MCV RBC AUTO: 74.8 FL (ref 79–97)
MICROCYTES BLD QL: ABNORMAL
MONOCYTES # BLD AUTO: 0.7 10*3/MM3 (ref 0.1–0.9)
NEUTROPHILS # BLD AUTO: 5.36 10*3/MM3 (ref 1.7–7)
NEUTROPHILS NFR BLD MANUAL: 69.7 % (ref 42.7–76)
NRBC BLD AUTO-RTO: 0 /100 WBC (ref 0–0.2)
PLAT MORPH BLD: NORMAL
PLATELET # BLD AUTO: 279 10*3/MM3 (ref 140–450)
PMV BLD AUTO: 10.1 FL (ref 6–12)
POTASSIUM SERPL-SCNC: 4.1 MMOL/L (ref 3.5–5.2)
PROT SERPL-MCNC: 7 G/DL (ref 6–8.5)
RBC # BLD AUTO: 4.8 10*6/MM3 (ref 3.77–5.28)
SODIUM SERPL-SCNC: 141 MMOL/L (ref 136–145)
TRIGL SERPL-MCNC: 157 MG/DL (ref 0–150)
TSH SERPL DL<=0.05 MIU/L-ACNC: 1.96 UIU/ML (ref 0.27–4.2)
VIT B12 BLD-MCNC: 458 PG/ML (ref 211–946)
VLDLC SERPL-MCNC: 27 MG/DL (ref 5–40)
WBC # BLD AUTO: 7.69 10*3/MM3 (ref 3.4–10.8)
WBC MORPH BLD: NORMAL

## 2021-09-23 NOTE — TELEPHONE ENCOUNTER
HUB TO READ    ----- Message from Nunu Kingston MD sent at 9/23/2021  9:17 AM EDT -----    ANEMIA HAS WORSENED SLIGHTLY.  STAFF TO SEND 2 ifobs AND PATIENT TO INCREASE IRON IN FOOD RICH IN OR FORTIFIED WITH OR CAN TRY SOME BY MOUTH-LET ME KNOW.  GLUCOSE 120-advise she watch carbs and walk as we don't want her to get Diabetes.  Vitamin B12 is slightly low-increase or start otc med one day/week.    I have mailed 2 IFOBS already - KM

## 2021-09-23 NOTE — PROGRESS NOTES
ANEMIA HAS WORSENED SLIGHTLY.  STAFF TO SEND 2 ifobs AND PATIENT TO INCREASE IRON IN FOOD RICH IN OR FORTIFIED WITH OR CAN TRY SOME BY MOUTH-LET ME KNOW.  GLUCOSE 120-advise she watch carbs and walk as we don't want her to get Diabetes.  Vitamin B12 is slightly low-increase or start otc med one day/week.

## 2021-09-23 NOTE — TELEPHONE ENCOUNTER
HUB TO READ  ----- Message from Nunu Kingston MD sent at 9/23/2021  9:17 AM EDT -----  ANEMIA HAS WORSENED SLIGHTLY.  STAFF TO SEND 2 ifobs AND PATIENT TO INCREASE IRON IN FOOD RICH IN OR FORTIFIED WITH OR CAN TRY SOME BY MOUTH-LET ME KNOW.  GLUCOSE 120-advise she watch carbs and walk as we don't want her to get Diabetes.  Vitamin B12 is slightly low-increase or start otc med one day/week.

## 2021-10-12 ENCOUNTER — TELEPHONE (OUTPATIENT)
Dept: FAMILY MEDICINE CLINIC | Facility: CLINIC | Age: 61
End: 2021-10-12

## 2021-10-12 RX ORDER — LISINOPRIL 40 MG/1
40 TABLET ORAL DAILY
Qty: 90 TABLET | Refills: 3 | Status: SHIPPED | OUTPATIENT
Start: 2021-10-12 | End: 2021-12-10 | Stop reason: SDUPTHER

## 2021-10-12 NOTE — TELEPHONE ENCOUNTER
The BP is slightly high-have sent increase in Lisinopril to pharmacy.  After on it for one week send 10 more blood pressures and pulses

## 2021-11-09 ENCOUNTER — NURSE TRIAGE (OUTPATIENT)
Dept: CALL CENTER | Facility: HOSPITAL | Age: 61
End: 2021-11-09

## 2021-11-09 ENCOUNTER — OFFICE VISIT (OUTPATIENT)
Dept: FAMILY MEDICINE CLINIC | Facility: CLINIC | Age: 61
End: 2021-11-09

## 2021-11-09 VITALS
HEIGHT: 65 IN | BODY MASS INDEX: 43.15 KG/M2 | HEART RATE: 80 BPM | DIASTOLIC BLOOD PRESSURE: 70 MMHG | TEMPERATURE: 97.7 F | WEIGHT: 259 LBS | SYSTOLIC BLOOD PRESSURE: 139 MMHG | OXYGEN SATURATION: 95 %

## 2021-11-09 DIAGNOSIS — E66.01 CLASS 3 SEVERE OBESITY DUE TO EXCESS CALORIES WITHOUT SERIOUS COMORBIDITY WITH BODY MASS INDEX (BMI) OF 40.0 TO 44.9 IN ADULT (HCC): ICD-10-CM

## 2021-11-09 DIAGNOSIS — R55 SYNCOPE AND COLLAPSE: Primary | ICD-10-CM

## 2021-11-09 DIAGNOSIS — I10 ESSENTIAL HYPERTENSION: ICD-10-CM

## 2021-11-09 DIAGNOSIS — D3A.020 CARCINOID TUMOR OF APPENDIX, UNSPECIFIED WHETHER MALIGNANT: ICD-10-CM

## 2021-11-09 DIAGNOSIS — H91.93 DECREASED HEARING OF BOTH EARS: ICD-10-CM

## 2021-11-09 PROBLEM — D37.3 NEOPLASM OF UNCERTAIN BEHAVIOR OF APPENDIX: Status: ACTIVE | Noted: 2020-03-20

## 2021-11-09 PROBLEM — K38.8 OTHER SPECIFIED DISEASES OF APPENDIX: Status: ACTIVE | Noted: 2020-03-20

## 2021-11-09 PROCEDURE — 99214 OFFICE O/P EST MOD 30 MIN: CPT | Performed by: PREVENTIVE MEDICINE

## 2021-11-09 PROCEDURE — 93000 ELECTROCARDIOGRAM COMPLETE: CPT | Performed by: PREVENTIVE MEDICINE

## 2021-11-09 NOTE — PROGRESS NOTES
Procedure     ECG 12 Lead    Date/Time: 11/9/2021 4:52 PM  Performed by: Nunu Kingston MD  Authorized by: Nunu Kingston MD   Comparison: compared with previous ECG from 1/9/2014  Similar to previous ECG  Rhythm: sinus rhythm  Rate: normal  Conduction: conduction normal  ST Segments: ST segments normal  T Waves: T waves normal  QRS axis: normal  Other: no other findings    Clinical impression: normal ECG

## 2021-11-09 NOTE — TELEPHONE ENCOUNTER
"Transferred caller to Renetta at office.  Did recommend ER she would like to speak to office first.     Reason for Disposition  • [1] Fainted > 15 minutes ago AND [2] still feels weak or dizzy    Additional Information  • Negative: Still unconscious  • Negative: Difficult to awaken or acting confused (e.g., disoriented, slurred speech)  • Negative: Shock suspected (e.g., cold/pale/clammy skin, too weak to stand, low BP, rapid pulse)  • Negative: Difficulty breathing  • Negative: Bluish (or gray) lips or face now  • Negative: Chest pain  • Negative: Extra heart beats or heart is beating fast  (i.e.,\"palpitations\")  • Negative: Bleeding (e.g., vomiting blood, rectal bleeding or tarry stools, severe vaginal bleeding)(Exception: fainted from sight of small amount of blood; small cut or abrasion)  • Negative: Fainted suddenly after medicine, allergic food or bee sting  • Negative: Age > 50 years (Exception: occurred > 1 hour ago AND now feels completely fine)  • Negative: History of heart problems (e.g., congestive heart failure, heart attack)  • Negative: [1] Fainted > 15 minutes ago AND [2] still feels too weak or dizzy to stand  • Negative: Sounds like a life-threatening emergency to the triager  • Negative: [1] Diabetes mellitus AND [2] fainting from low blood sugar (i.e., < 70 mg/dl or 3.9 mmol/l)  • Negative: Seizure suspected (e.g., muscle jerking or shaking followed by confusion)  • Negative: Heat exhaustion suspected (i.e., dehydration from heat exposure)  • Negative: [1] Fainted > 15 minutes ago AND [2] still looks pale (pale skin, pallor)    Answer Assessment - Initial Assessment Questions  1. ONSET: \"How long were you unconscious?\" (minutes) \"When did it happen?\"      Last night around 10pm  2. CONTENT: \"What happened during period of unconsciousness?\" (e.g., seizure activity)       Felt hot, got to chair and woke up in the floor  3. MENTAL STATUS: \"Alert and oriented now?\" (oriented x 3 = name, month, " "location)       Is at work today doing normal activities  4. TRIGGER: \"What do you think caused the fainting?\" \"What were you doing just before you fainted?\"  (e.g., exercise, sudden standing up, prolonged standing)      Increase in Bp meds October. But nothing new.    5. RECURRENT SYMPTOM: \"Have you ever passed out before?\" If Yes, ask: \"When was the last time?\" and \"What happened that time?\"       Yes after surgery on her foot years ago, over 20 years ago  6. INJURY: \"Did you sustain any injury during the fall?\"       Bruised her thumb  7. CARDIAC SYMPTOMS: \"Have you had any of the following symptoms: chest pain, difficulty breathing, palpitations?\"      She has asthma and felt like she needed her inhaler but did not use it, so yes a little shortness of air  8. NEUROLOGIC SYMPTOMS: \"Have you had any of the following symptoms: headache, numbness, vertigo, weakness?\"      Felt hot, headache also  9. GI SYMPTOMS: \"Have you had any of the following symptoms: abdominal pain, vomiting, diarrhea, blood in stools?\"      None  10. OTHER SYMPTOMS: \"Do you have any other symptoms?\"         Tired and little bit nauseated  11. PREGNANCY: \"Is there any chance you are pregnant?\" \"When was your last menstrual period?\"        NA    Protocols used: FAINTING-ADULT-AH      "

## 2021-11-09 NOTE — PROGRESS NOTES
Subjective   Eve Garrett is a 61 y.o. female presents for   Chief Complaint   Patient presents with   • Syncope     Got hot and passed out. Happened last night.   • Hypertension   First night on a new job where she had to stand for approximately 7 hours she felt hot faint and went to sit down and then apparently passed out as she was found on the floor at work.  Patient has been advised that she will need clearance before she returns to work.  Patient has had one other episode of syncope back in  when her mother-in-law  and was worked up at that time by Dr. Jones with the stress test echocardiogram and Holter monitor as well as tilt table test and nothing was found.  It should be noted that the patient has been placed on iron due to low blood cell count and has noted green stool after starting the iron.  A carcinoid was removed from the right side of the colon and was reevaluated at [facility] for persistent cancer and it was felt to be clear.  Patient will follow up on a regular basis with males as well.  Patient states that she had a headache after she fell but no headache prior to the fall.  She has not had any chest pain increased shortness of breath does use an inhaler for asthma.  Attempts were made to try to take her blood pressure at the time of her passout she refused an ambulance and is showing up in the office today approximately 18 hours later for evaluation.  Today she just has a slight headache and has worked her first job today without any difficulty.  No chest pain or shortness of air today no burning with urination or dark or green stools.    Health Maintenance Due   Topic Date Due   • COLORECTAL CANCER SCREENING  Never done   • TDAP/TD VACCINES (1 - Tdap) 2008       History of Present Illness     Vitals:    21 1550 21 1552   BP: 139/70    BP Location: Right arm Left arm   Patient Position: Sitting Sitting   Cuff Size: Large Adult Large Adult   Pulse: 80    Temp: 97.7 °F (36.5  "°C)    SpO2: 95%    Weight: 117 kg (259 lb)    Height: 165.1 cm (65\")      Body mass index is 43.1 kg/m².    Current Outpatient Medications on File Prior to Visit   Medication Sig Dispense Refill   • albuterol sulfate  (90 Base) MCG/ACT inhaler Inhale 2 puffs.     • atorvastatin (LIPITOR) 20 MG tablet Take 40 mg by mouth Daily.     • HYDROcodone-acetaminophen (NORCO) 7.5-325 MG per tablet Take 1 tablet by mouth Every 6 (Six) Hours As Needed for Moderate Pain . 12 tablet 0   • lisinopril (PRINIVIL,ZESTRIL) 40 MG tablet Take 1 tablet by mouth Daily. 90 tablet 3   • metoprolol tartrate (LOPRESSOR) 50 MG tablet Take 50 mg by mouth.     • multivitamin (THERAGRAN) tablet tablet Take 1 tablet by mouth Daily.     • Omega-3 Fatty Acids (fish oil) 1000 MG capsule capsule Take 1,000 mg by mouth Daily.     • omeprazole (priLOSEC) 40 MG capsule Take 1 capsule by mouth Daily. 90 capsule 1   • Tiotropium Bromide Monohydrate (Spiriva Respimat) 1.25 MCG/ACT aerosol solution inhaler Inhale 2 puffs Daily. 3 each 3   • Triamcinolone Acetonide (NASACORT AQ NA) into the nostril(s) as directed by provider.     • Umeclidinium Bromide (Incruse Ellipta) 62.5 MCG/INH aerosol powder  Inhale 1 puff Daily. 1 each 1   • zolpidem (AMBIEN) 5 MG tablet Take 1 tablet by mouth Take As Directed for 2 doses. Bring the medication to the sleep lab. DO NOT USE AT HOME 2 tablet 0   • [DISCONTINUED] ondansetron ODT (ZOFRAN-ODT) 4 MG disintegrating tablet Place 1 tablet on the tongue Every 8 (Eight) Hours As Needed for Nausea or Vomiting. 10 tablet 0     No current facility-administered medications on file prior to visit.       The following portions of the patient's history were reviewed and updated as appropriate: allergies, current medications, past family history, past medical history, past social history, past surgical history and problem list.    Review of Systems   Endocrine: Positive for heat intolerance.   Neurological: Positive for headache. "       Objective   Physical Exam  Vitals reviewed.   Constitutional:       General: She is not in acute distress.     Appearance: She is well-developed. She is obese. She is not ill-appearing or toxic-appearing.   HENT:      Head: Normocephalic and atraumatic.      Right Ear: Tympanic membrane, ear canal and external ear normal.      Left Ear: Tympanic membrane, ear canal and external ear normal.      Nose: Nose normal.   Eyes:      Extraocular Movements: Extraocular movements intact.      Conjunctiva/sclera: Conjunctivae normal.      Pupils: Pupils are equal, round, and reactive to light.   Cardiovascular:      Rate and Rhythm: Normal rate and regular rhythm.      Heart sounds: Normal heart sounds.   Pulmonary:      Effort: Pulmonary effort is normal.      Breath sounds: Normal breath sounds.   Abdominal:      General: Bowel sounds are normal. There is no distension.      Palpations: Abdomen is soft. There is no mass.      Tenderness: There is no abdominal tenderness.   Musculoskeletal:         General: Normal range of motion.      Cervical back: Neck supple.   Skin:     General: Skin is warm.   Neurological:      General: No focal deficit present.      Mental Status: She is alert and oriented to person, place, and time.   Psychiatric:         Mood and Affect: Mood normal.         Behavior: Behavior normal.       PHQ-9 Total Score:      Assessment/Plan   Diagnoses and all orders for this visit:    1. Syncope and collapse (Primary)  -     CT Head Without Contrast; Future  -     Duplex Carotid Ultrasound CAR; Future  -     CBC Auto Differential; Future  -     Comprehensive Metabolic Panel; Future  -     Magnesium; Future  -     TSH; Future  -     C-reactive Protein; Future  -     Sedimentation Rate; Future  -     UA / M With / Rflx Culture([facility] ONLY) - Urine, Clean Catch; Future  -     Ambulatory Referral to Cardiology  -     ECG 12 Lead    2. Class 3 severe obesity due to excess calories without serious  comorbidity with body mass index (BMI) of 40.0 to 44.9 in adult (HCC)  Comments:  Portion size and walking were again discussed.    3. Carcinoid tumor of appendix, unspecified whether malignant  Comments:  Recent Cavazos evaluation feels as though the area of the intestine is free of cancer but they will continue to follow.    4. Essential hypertension  Comments:  Controlled.    5. Decreased hearing of both ears  Comments:  Patient was undergoing ear nose and throat work-up with Covid hit and has not returned to see Dr. Rogers referral was placed again today  Orders:  -     Ambulatory Referral to ENT (Otolaryngology)        Patient Instructions   Send 2 ifobs home with patient.    Recheck again if stools turn black or green    12 hour fast for labs

## 2021-11-09 NOTE — PATIENT INSTRUCTIONS
Send 2 ifobs home with patient.    Recheck again if stools turn black or green    12 hour fast for labs

## 2021-11-19 ENCOUNTER — HOSPITAL ENCOUNTER (OUTPATIENT)
Dept: CT IMAGING | Facility: HOSPITAL | Age: 61
Discharge: HOME | End: 2021-11-19

## 2021-11-19 ENCOUNTER — HOSPITAL ENCOUNTER (OUTPATIENT)
Dept: CARDIOLOGY | Facility: HOSPITAL | Age: 61
Discharge: HOME | End: 2021-11-19

## 2021-11-19 DIAGNOSIS — R55 SYNCOPE AND COLLAPSE: ICD-10-CM

## 2021-11-19 LAB
BH CV XLRA MEAS LEFT CCA RATIO VEL: 145 CM/SEC
BH CV XLRA MEAS LEFT DIST CCA EDV: 25.1 CM/SEC
BH CV XLRA MEAS LEFT DIST CCA PSV: 114 CM/SEC
BH CV XLRA MEAS LEFT DIST ICA EDV: -42.7 CM/SEC
BH CV XLRA MEAS LEFT DIST ICA PSV: -119 CM/SEC
BH CV XLRA MEAS LEFT ICA RATIO VEL: -119 CM/SEC
BH CV XLRA MEAS LEFT ICA/CCA RATIO: -0.82
BH CV XLRA MEAS LEFT PROX CCA EDV: 33.8 CM/SEC
BH CV XLRA MEAS LEFT PROX CCA PSV: 145 CM/SEC
BH CV XLRA MEAS LEFT PROX ECA PSV: -123 CM/SEC
BH CV XLRA MEAS LEFT PROX ICA EDV: -23.8 CM/SEC
BH CV XLRA MEAS LEFT PROX ICA PSV: -94.6 CM/SEC
BH CV XLRA MEAS LEFT PROX SCLA PSV: 190 CM/SEC
BH CV XLRA MEAS LEFT VERTEBRAL A EDV: -18.2 CM/SEC
BH CV XLRA MEAS LEFT VERTEBRAL A PSV: -71.5 CM/SEC
BH CV XLRA MEAS RIGHT CCA RATIO VEL: 107 CM/SEC
BH CV XLRA MEAS RIGHT DIST CCA EDV: 17.4 CM/SEC
BH CV XLRA MEAS RIGHT DIST CCA PSV: 88.8 CM/SEC
BH CV XLRA MEAS RIGHT DIST ICA EDV: -26.3 CM/SEC
BH CV XLRA MEAS RIGHT DIST ICA PSV: -79 CM/SEC
BH CV XLRA MEAS RIGHT ICA RATIO VEL: -79 CM/SEC
BH CV XLRA MEAS RIGHT ICA/CCA RATIO: -0.74
BH CV XLRA MEAS RIGHT PROX CCA EDV: 15.5 CM/SEC
BH CV XLRA MEAS RIGHT PROX CCA PSV: 107 CM/SEC
BH CV XLRA MEAS RIGHT PROX ECA PSV: -153 CM/SEC
BH CV XLRA MEAS RIGHT PROX ICA EDV: -24.7 CM/SEC
BH CV XLRA MEAS RIGHT PROX ICA PSV: -76.8 CM/SEC
BH CV XLRA MEAS RIGHT PROX SCLA PSV: 139 CM/SEC
BH CV XLRA MEAS RIGHT VERTEBRAL A EDV: -18.1 CM/SEC
BH CV XLRA MEAS RIGHT VERTEBRAL A PSV: -71.3 CM/SEC
LEFT ARM BP: NORMAL MMHG
MAXIMAL PREDICTED HEART RATE: 160 BPM
RIGHT ARM BP: NORMAL MMHG
STRESS TARGET HR: 136 BPM

## 2021-11-19 PROCEDURE — 70450 CT HEAD/BRAIN W/O DYE: CPT

## 2021-11-19 PROCEDURE — 93880 EXTRACRANIAL BILAT STUDY: CPT

## 2021-12-09 NOTE — PATIENT INSTRUCTIONS
Health Maintenance Due   Topic Date Due   • COLORECTAL CANCER SCREENING  Never done   • TDAP/TD VACCINES (1 - Tdap) 09/12/2008   • ZOSTER VACCINE (2 of 2) 11/16/2021     Give patient sleep clinic phone numberand she'll schedule eval    Call with other inhaler    OK to give note to Bakari

## 2021-12-10 ENCOUNTER — OFFICE VISIT (OUTPATIENT)
Dept: FAMILY MEDICINE CLINIC | Facility: CLINIC | Age: 61
End: 2021-12-10

## 2021-12-10 ENCOUNTER — TELEPHONE (OUTPATIENT)
Dept: FAMILY MEDICINE CLINIC | Facility: CLINIC | Age: 61
End: 2021-12-10

## 2021-12-10 VITALS
HEART RATE: 76 BPM | WEIGHT: 261.2 LBS | DIASTOLIC BLOOD PRESSURE: 85 MMHG | HEIGHT: 65 IN | SYSTOLIC BLOOD PRESSURE: 145 MMHG | OXYGEN SATURATION: 95 % | BODY MASS INDEX: 43.52 KG/M2 | TEMPERATURE: 97.8 F

## 2021-12-10 DIAGNOSIS — E07.9 DISEASE OF THYROID GLAND: ICD-10-CM

## 2021-12-10 DIAGNOSIS — E78.5 HYPERLIPIDEMIA, UNSPECIFIED HYPERLIPIDEMIA TYPE: ICD-10-CM

## 2021-12-10 DIAGNOSIS — J45.909 ASTHMA, UNSPECIFIED ASTHMA SEVERITY, UNSPECIFIED WHETHER COMPLICATED, UNSPECIFIED WHETHER PERSISTENT: ICD-10-CM

## 2021-12-10 DIAGNOSIS — F32.A DEPRESSION, UNSPECIFIED DEPRESSION TYPE: ICD-10-CM

## 2021-12-10 DIAGNOSIS — I10 ESSENTIAL HYPERTENSION: ICD-10-CM

## 2021-12-10 DIAGNOSIS — K59.00 CONSTIPATION, UNSPECIFIED CONSTIPATION TYPE: ICD-10-CM

## 2021-12-10 DIAGNOSIS — G47.30 OBSERVED SLEEP APNEA: ICD-10-CM

## 2021-12-10 DIAGNOSIS — K21.9 GASTROESOPHAGEAL REFLUX DISEASE WITHOUT ESOPHAGITIS: ICD-10-CM

## 2021-12-10 DIAGNOSIS — R55 SYNCOPE AND COLLAPSE: Primary | ICD-10-CM

## 2021-12-10 DIAGNOSIS — E66.01 CLASS 3 SEVERE OBESITY DUE TO EXCESS CALORIES WITHOUT SERIOUS COMORBIDITY WITH BODY MASS INDEX (BMI) OF 40.0 TO 44.9 IN ADULT (HCC): ICD-10-CM

## 2021-12-10 DIAGNOSIS — D3A.020 CARCINOID TUMOR OF APPENDIX, UNSPECIFIED WHETHER MALIGNANT: ICD-10-CM

## 2021-12-10 DIAGNOSIS — J02.9 SORE THROAT: ICD-10-CM

## 2021-12-10 LAB
EXPIRATION DATE: NORMAL
INTERNAL CONTROL: NORMAL
Lab: NORMAL
S PYO AG THROAT QL: NEGATIVE

## 2021-12-10 PROCEDURE — 87880 STREP A ASSAY W/OPTIC: CPT | Performed by: PREVENTIVE MEDICINE

## 2021-12-10 PROCEDURE — 99214 OFFICE O/P EST MOD 30 MIN: CPT | Performed by: PREVENTIVE MEDICINE

## 2021-12-10 PROCEDURE — U0004 COV-19 TEST NON-CDC HGH THRU: HCPCS | Performed by: INTERNAL MEDICINE

## 2021-12-10 RX ORDER — LISINOPRIL 40 MG/1
40 TABLET ORAL DAILY
Qty: 90 TABLET | Refills: 3 | Status: SHIPPED | OUTPATIENT
Start: 2021-12-10 | End: 2021-12-13 | Stop reason: SDUPTHER

## 2021-12-10 RX ORDER — ATORVASTATIN CALCIUM 20 MG/1
40 TABLET, FILM COATED ORAL NIGHTLY
Qty: 90 TABLET | Refills: 3 | Status: SHIPPED | OUTPATIENT
Start: 2021-12-10 | End: 2021-12-13 | Stop reason: SDUPTHER

## 2021-12-10 RX ORDER — METOPROLOL TARTRATE 50 MG/1
50 TABLET, FILM COATED ORAL 2 TIMES DAILY
Qty: 180 TABLET | Refills: 3 | Status: SHIPPED | OUTPATIENT
Start: 2021-12-10 | End: 2021-12-13 | Stop reason: SDUPTHER

## 2021-12-10 RX ORDER — ICOSAPENT ETHYL 1000 MG/1
2 CAPSULE ORAL 2 TIMES DAILY WITH MEALS
Qty: 360 CAPSULE | Refills: 3 | Status: SHIPPED | OUTPATIENT
Start: 2021-12-10 | End: 2021-12-13 | Stop reason: SDUPTHER

## 2021-12-10 RX ORDER — ALBUTEROL SULFATE 90 UG/1
2 AEROSOL, METERED RESPIRATORY (INHALATION) EVERY 4 HOURS PRN
Qty: 3 G | Refills: 3 | Status: SHIPPED | OUTPATIENT
Start: 2021-12-10 | End: 2021-12-13 | Stop reason: SDUPTHER

## 2021-12-10 NOTE — PROGRESS NOTES
"Lula Garrett is a 61 y.o. 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  Patient has had her Covid vaccination.  Health Maintenance Due   Topic Date Due   • TDAP/TD VACCINES (1 - Tdap) 09/12/2008   • ZOSTER VACCINE (2 of 2) 11/16/2021       History of Present Illness     Vitals:    12/10/21 0913 12/10/21 0915   BP: 134/85 145/85   BP Location: Left arm Right arm   Patient Position: Sitting Sitting   Cuff Size: Large Adult Large Adult   Pulse: 76    Temp: 97.8 °F (36.6 °C)    TempSrc: Temporal    SpO2: 95%    Weight: 118 kg (261 lb 3.2 oz)    Height: 165.1 cm (65\")      Body mass index is 43.47 kg/m².    Current Outpatient Medications on File Prior to Visit   Medication Sig Dispense Refill   • multivitamin (THERAGRAN) tablet tablet Take 1 tablet by mouth Daily.     • omeprazole (priLOSEC) 40 MG capsule Take 1 capsule by mouth Daily. 90 capsule 1   • Triamcinolone Acetonide (NASACORT AQ NA) into the nostril(s) as directed by provider.       No current facility-administered medications on file prior to visit.       The following portions of the patient's history were reviewed and updated as appropriate: allergies, current medications, past family history, past medical history, past social history, past surgical history and problem list.    Review of Systems   HENT: Positive for congestion and sore throat.    Respiratory: Positive for cough.        Objective   Physical Exam  Vitals reviewed. "   Constitutional:       General: She is not in acute distress.     Appearance: She is well-developed. She is obese. She is not ill-appearing or toxic-appearing.   HENT:      Head: Normocephalic and atraumatic.      Right Ear: Tympanic membrane, ear canal and external ear normal.      Left Ear: Tympanic membrane, ear canal and external ear normal.      Nose: Congestion present.      Mouth/Throat:      Pharynx: Posterior oropharyngeal erythema present.   Eyes:      Extraocular Movements: Extraocular movements intact.      Conjunctiva/sclera: Conjunctivae normal.      Pupils: Pupils are equal, round, and reactive to light.   Cardiovascular:      Rate and Rhythm: Normal rate and regular rhythm.      Heart sounds: Normal heart sounds.   Pulmonary:      Effort: Pulmonary effort is normal.      Breath sounds: Normal breath sounds.   Abdominal:      General: Bowel sounds are normal. There is no distension.      Palpations: Abdomen is soft. There is no mass.      Tenderness: There is no abdominal tenderness.   Musculoskeletal:         General: Normal range of motion.      Cervical back: Neck supple.   Skin:     General: Skin is warm.   Neurological:      General: No focal deficit present.      Mental Status: She is alert and oriented to person, place, and time.   Psychiatric:         Mood and Affect: Mood normal.         Behavior: Behavior normal.       PHQ-9 Total Score:      Assessment/Plan   Diagnoses and all orders for this visit:    1. Syncope and collapse (Primary)  Comments:  No more episodes.    2. Disease of thyroid gland  Comments:  No increase in dry skin or hair loss    3. Carcinoid tumor of appendix, unspecified whether malignant  Comments:  No increase in abdominal pain    4. Hyperlipidemia, unspecified hyperlipidemia type  Comments:  Trying to eat less saturated fats    5. Observed sleep apnea  -     COVID-19,PABLO FARLEY(ANGELO),DANIEL FLANAGANU, NP/OP SWAB IN UTM/VTM/SALINE TRANSPORT MEDIA,24 HR TAT - Swab,  Nasopharynx    6. Essential hypertension  Comments:  Blood pressure not under good control will home monitor and call us back    7. Gastroesophageal reflux disease without esophagitis  Comments:  Heartburn is controlled we will still consider switching to famotidine.    8. Depression, unspecified depression type  Comments:  No HI or SI mood seems to be good presently    9. Constipation, unspecified constipation type  Comments:  Controlled.    10. Asthma, unspecified asthma severity, unspecified whether complicated, unspecified whether persistent  Comments:  Poor control.  Will call back with inhaler that she has tried and did not work.    11. 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 Class 3 severe obesity due to excess calories without serious comorbidity with body mass index (BMI) of 40.0 to 44.9 in adult (HCC)    Other orders  -     Discontinue: icosapent ethyl (Vascepa) 1 g capsule capsule; Take 2 g by mouth 2 (Two) Times a Day With Meals.  Dispense: 360 capsule; Refill: 3  -     Discontinue: lisinopril (PRINIVIL,ZESTRIL) 40 MG tablet; Take 1 tablet by mouth Daily.  Dispense: 90 tablet; Refill: 3  -     Discontinue: metoprolol tartrate (LOPRESSOR) 50 MG tablet; Take 1 tablet by mouth 2 (Two) Times a Day.  Dispense: 180 tablet; Refill: 3  -     Discontinue: albuterol sulfate  (90 Base) MCG/ACT inhaler; Inhale 2 puffs Every 4 (Four) Hours As Needed for Wheezing.  Dispense: 3 g; Refill: 3  -     Discontinue: atorvastatin (LIPITOR) 20 MG tablet; Take 2 tablets by mouth Every Night.   Dispense: 90 tablet; Refill: 3        Patient Instructions     Health Maintenance Due   Topic Date Due   • COLORECTAL CANCER SCREENING  Never done   • TDAP/TD VACCINES (1 - Tdap) 09/12/2008   • ZOSTER VACCINE (2 of 2) 11/16/2021     Give patient sleep clinic phone numberand she'll schedule eval    Call with other inhaler    OK to give note to Bakari

## 2021-12-10 NOTE — TELEPHONE ENCOUNTER
We can see if insurance will cover Trelegy inhaler-takes one puff once daily if not allergic to milk

## 2021-12-11 ENCOUNTER — TELEPHONE (OUTPATIENT)
Dept: FAMILY MEDICINE CLINIC | Facility: CLINIC | Age: 61
End: 2021-12-11

## 2021-12-11 LAB — SARS-COV-2 ORF1AB RESP QL NAA+PROBE: NOT DETECTED

## 2021-12-11 NOTE — TELEPHONE ENCOUNTER
HUB TO READ  ----- Message from Nunu Kingston MD sent at 12/11/2021  5:51 AM EST -----  Covid test negative-if still feeling poorly can repeat first of next week

## 2021-12-13 ENCOUNTER — TELEPHONE (OUTPATIENT)
Dept: FAMILY MEDICINE CLINIC | Facility: CLINIC | Age: 61
End: 2021-12-13

## 2021-12-13 RX ORDER — LISINOPRIL 40 MG/1
40 TABLET ORAL DAILY
Qty: 90 TABLET | Refills: 3 | Status: SHIPPED | OUTPATIENT
Start: 2021-12-13 | End: 2022-08-19 | Stop reason: SDUPTHER

## 2021-12-13 RX ORDER — ALBUTEROL SULFATE 90 UG/1
2 AEROSOL, METERED RESPIRATORY (INHALATION) EVERY 4 HOURS PRN
Qty: 3 G | Refills: 3 | Status: SHIPPED | OUTPATIENT
Start: 2021-12-13 | End: 2022-08-19 | Stop reason: SDUPTHER

## 2021-12-13 RX ORDER — ICOSAPENT ETHYL 1000 MG/1
2 CAPSULE ORAL 2 TIMES DAILY WITH MEALS
Qty: 360 CAPSULE | Refills: 3 | Status: SHIPPED | OUTPATIENT
Start: 2021-12-13 | End: 2023-01-24

## 2021-12-13 RX ORDER — ATORVASTATIN CALCIUM 20 MG/1
40 TABLET, FILM COATED ORAL NIGHTLY
Qty: 90 TABLET | Refills: 3 | Status: SHIPPED | OUTPATIENT
Start: 2021-12-13 | End: 2022-08-19 | Stop reason: SDUPTHER

## 2021-12-13 RX ORDER — METOPROLOL TARTRATE 50 MG/1
50 TABLET, FILM COATED ORAL 2 TIMES DAILY
Qty: 180 TABLET | Refills: 3 | Status: SHIPPED | OUTPATIENT
Start: 2021-12-13 | End: 2022-08-19 | Stop reason: SDUPTHER

## 2021-12-13 NOTE — TELEPHONE ENCOUNTER
PATIENT STATES SHE REQUESTED US TO CALL IN LIKE 5 SCRIPTS TO [facility] BUT WE SENT THEM TO [facility].... PLEASE CANCEL ALL SCRIPTS THAT WERE SENT OVER TO [facility] ON 12/10 AND RESEND THEM TO [facility].     PATIENT > [phone]

## 2021-12-13 NOTE — TELEPHONE ENCOUNTER
She stated they had the Spiriva ready and she picked up and is willing to switch if you wanted to change her to Trelegy for the next time

## 2021-12-13 NOTE — TELEPHONE ENCOUNTER
Pharmacy at [facility] was notified- Patient was notified please sign changes

## 2021-12-13 NOTE — TELEPHONE ENCOUNTER
Caller: Eve Garrett     Relationship to Patient: SELF    Phone Number: [phone]     Reason for Call: PATIENT CALLED TO CHECK ON THE STATUS OF THIS REQUEST. SHE SAID THE [facility] HASN'T RECEIVED THE PRESCRIPTIONS YET. THEIR FAX NUMBER IS: [phone]

## 2021-12-13 NOTE — TELEPHONE ENCOUNTER
Caller: Eve Garrett    Relationship to patient: Self    Best call back number: [phone]    Patient is needing: PATIENT CALLING AGAIN TO CHECK STATUS OF PRESCRIPTION TRANSFER. PATIENT STATES SHE WAS TOLD EARLIER THAT THE PRESCRIPTIONS WOULD GO STRAIGHT TO DR. MELGOZA FOR SIGNATURE AND SHE NEEDS IT BEFORE PHARMACY CLOSES FOR LUNCH. PATIENT STATES SHE NEEDS IT ASAP.

## 2021-12-15 ENCOUNTER — TELEPHONE (OUTPATIENT)
Dept: FAMILY MEDICINE CLINIC | Facility: CLINIC | Age: 61
End: 2021-12-15

## 2021-12-16 ENCOUNTER — TELEPHONE (OUTPATIENT)
Dept: FAMILY MEDICINE CLINIC | Facility: CLINIC | Age: 61
End: 2021-12-16

## 2021-12-22 ENCOUNTER — CLINICAL SUPPORT (OUTPATIENT)
Dept: FAMILY MEDICINE CLINIC | Facility: CLINIC | Age: 61
End: 2021-12-22

## 2021-12-22 DIAGNOSIS — R55 SYNCOPE AND COLLAPSE: ICD-10-CM

## 2021-12-22 LAB
BASOPHILS # BLD AUTO: 0.06 10*3/MM3 (ref 0–0.2)
BASOPHILS NFR BLD AUTO: 0.8 % (ref 0–1.5)
DEPRECATED RDW RBC AUTO: 47.8 FL (ref 37–54)
EOSINOPHIL # BLD AUTO: 0.23 10*3/MM3 (ref 0–0.4)
EOSINOPHIL NFR BLD AUTO: 3.1 % (ref 0.3–6.2)
ERYTHROCYTE [DISTWIDTH] IN BLOOD BY AUTOMATED COUNT: 17 % (ref 12.3–15.4)
HCT VFR BLD AUTO: 38.6 % (ref 34–46.6)
HGB BLD-MCNC: 12.1 G/DL (ref 12–15.9)
IMM GRANULOCYTES # BLD AUTO: 0.01 10*3/MM3 (ref 0–0.05)
IMM GRANULOCYTES NFR BLD AUTO: 0.1 % (ref 0–0.5)
LYMPHOCYTES # BLD AUTO: 2.27 10*3/MM3 (ref 0.7–3.1)
LYMPHOCYTES NFR BLD AUTO: 31 % (ref 19.6–45.3)
MCH RBC QN AUTO: 24.5 PG (ref 26.6–33)
MCHC RBC AUTO-ENTMCNC: 31.3 G/DL (ref 31.5–35.7)
MCV RBC AUTO: 78.1 FL (ref 79–97)
MONOCYTES # BLD AUTO: 0.89 10*3/MM3 (ref 0.1–0.9)
MONOCYTES NFR BLD AUTO: 12.2 % (ref 5–12)
NEUTROPHILS NFR BLD AUTO: 3.86 10*3/MM3 (ref 1.7–7)
NEUTROPHILS NFR BLD AUTO: 52.8 % (ref 42.7–76)
NRBC BLD AUTO-RTO: 0 /100 WBC (ref 0–0.2)
PLATELET # BLD AUTO: 278 10*3/MM3 (ref 140–450)
PMV BLD AUTO: 10.4 FL (ref 6–12)
RBC # BLD AUTO: 4.94 10*6/MM3 (ref 3.77–5.28)
WBC NRBC COR # BLD: 7.32 10*3/MM3 (ref 3.4–10.8)

## 2021-12-22 PROCEDURE — 86140 C-REACTIVE PROTEIN: CPT | Performed by: PREVENTIVE MEDICINE

## 2021-12-22 PROCEDURE — 83735 ASSAY OF MAGNESIUM: CPT | Performed by: PREVENTIVE MEDICINE

## 2021-12-22 PROCEDURE — 84443 ASSAY THYROID STIM HORMONE: CPT | Performed by: PREVENTIVE MEDICINE

## 2021-12-22 PROCEDURE — 80053 COMPREHEN METABOLIC PANEL: CPT | Performed by: PREVENTIVE MEDICINE

## 2021-12-22 PROCEDURE — 36415 COLL VENOUS BLD VENIPUNCTURE: CPT | Performed by: NURSE PRACTITIONER

## 2021-12-22 PROCEDURE — 81003 URINALYSIS AUTO W/O SCOPE: CPT | Performed by: PREVENTIVE MEDICINE

## 2021-12-22 PROCEDURE — 85025 COMPLETE CBC W/AUTO DIFF WBC: CPT | Performed by: PREVENTIVE MEDICINE

## 2021-12-22 PROCEDURE — 85652 RBC SED RATE AUTOMATED: CPT | Performed by: PREVENTIVE MEDICINE

## 2021-12-22 NOTE — PROGRESS NOTES
Venipuncture Blood Specimen Collection  Venipuncture performed in right hand by Sebas Beebe MA with good hemostasis. Patient tolerated the procedure well without complications.   12/22/21   Sebas Beebe MA

## 2021-12-23 LAB
ALBUMIN SERPL-MCNC: 3.9 G/DL (ref 3.5–5.2)
ALBUMIN/GLOB SERPL: 1.3 G/DL
ALP SERPL-CCNC: 105 U/L (ref 39–117)
ALT SERPL W P-5'-P-CCNC: 34 U/L (ref 1–33)
ANION GAP SERPL CALCULATED.3IONS-SCNC: 9.2 MMOL/L (ref 5–15)
AST SERPL-CCNC: 27 U/L (ref 1–32)
BILIRUB SERPL-MCNC: 0.2 MG/DL (ref 0–1.2)
BILIRUB UR QL STRIP: NEGATIVE
BUN SERPL-MCNC: 11 MG/DL (ref 8–23)
BUN/CREAT SERPL: 14.9 (ref 7–25)
CALCIUM SPEC-SCNC: 9.7 MG/DL (ref 8.6–10.5)
CHLORIDE SERPL-SCNC: 103 MMOL/L (ref 98–107)
CLARITY UR: CLEAR
CO2 SERPL-SCNC: 27.8 MMOL/L (ref 22–29)
COLOR UR: YELLOW
CREAT SERPL-MCNC: 0.74 MG/DL (ref 0.57–1)
CRP SERPL-MCNC: 0.99 MG/DL (ref 0–0.5)
ERYTHROCYTE [SEDIMENTATION RATE] IN BLOOD: 36 MM/HR (ref 0–30)
GFR SERPL CREATININE-BSD FRML MDRD: 80 ML/MIN/1.73
GLOBULIN UR ELPH-MCNC: 3 GM/DL
GLUCOSE SERPL-MCNC: 114 MG/DL (ref 65–99)
GLUCOSE UR STRIP-MCNC: NEGATIVE MG/DL
HGB UR QL STRIP.AUTO: NEGATIVE
KETONES UR QL STRIP: NEGATIVE
LEUKOCYTE ESTERASE UR QL STRIP.AUTO: NEGATIVE
MAGNESIUM SERPL-MCNC: 2.1 MG/DL (ref 1.6–2.4)
NITRITE UR QL STRIP: NEGATIVE
PH UR STRIP.AUTO: 5.5 [PH] (ref 5–8)
POTASSIUM SERPL-SCNC: 4.7 MMOL/L (ref 3.5–5.2)
PROT SERPL-MCNC: 6.9 G/DL (ref 6–8.5)
PROT UR QL STRIP: NEGATIVE
SODIUM SERPL-SCNC: 140 MMOL/L (ref 136–145)
SP GR UR STRIP: 1.02 (ref 1–1.03)
TSH SERPL DL<=0.05 MIU/L-ACNC: 3.6 UIU/ML (ref 0.27–4.2)
UROBILINOGEN UR QL STRIP: NORMAL

## 2021-12-23 NOTE — PROGRESS NOTES
Glucose 114 so watch carbs and walk.  One liver function minimally elevated-we'll continue to follow.  Inflammatory markers mildly elevated probably due to recent URI/sore throat

## 2021-12-30 ENCOUNTER — OFFICE (AMBULATORY)
Dept: URBAN - METROPOLITAN AREA CLINIC 64 | Facility: CLINIC | Age: 61
End: 2021-12-30

## 2021-12-30 VITALS
SYSTOLIC BLOOD PRESSURE: 145 MMHG | DIASTOLIC BLOOD PRESSURE: 86 MMHG | HEIGHT: 67 IN | HEART RATE: 77 BPM | WEIGHT: 265 LBS

## 2021-12-30 DIAGNOSIS — K21.9 GASTRO-ESOPHAGEAL REFLUX DISEASE WITHOUT ESOPHAGITIS: ICD-10-CM

## 2021-12-30 PROCEDURE — 99213 OFFICE O/P EST LOW 20 MIN: CPT | Performed by: INTERNAL MEDICINE

## 2021-12-30 RX ORDER — OMEPRAZOLE 40 MG/1
40 CAPSULE, DELAYED RELEASE ORAL
Qty: 90 | Refills: 5 | Status: ACTIVE

## 2021-12-30 RX ORDER — OCTREOTIDE ACETATE 200 UG/ML
INJECTION, SOLUTION INTRAVENOUS; SUBCUTANEOUS
Qty: 90 | Refills: 5 | Status: COMPLETED
Start: 2021-12-30 | End: 2024-08-08

## 2021-12-30 RX ORDER — DICYCLOMINE HYDROCHLORIDE 20 MG/1
TABLET ORAL
Qty: 180 | Refills: 0 | Status: ACTIVE

## 2022-03-07 ENCOUNTER — TELEPHONE (OUTPATIENT)
Dept: SLEEP MEDICINE | Facility: CLINIC | Age: 62
End: 2022-03-07

## 2022-03-07 NOTE — TELEPHONE ENCOUNTER
Pt left vm re: scheduling sleep study.  Last office visit with Dr. Bagley was 9/7/2021.  Due to insurance requirements, patient will need visit with Dr. Bagley before study can be scheduled (it has been 6 months since last visit).    Attempt to call patient, call was answered but dropped.    0925:  Left vm for patient with above info.    1105:  Pt returned call.  Scheduled appt w/ Dr. Ambrocio 4/4/2022.

## 2022-03-17 PROBLEM — E55.9 VITAMIN D DEFICIENCY: Status: ACTIVE | Noted: 2022-03-17

## 2022-03-18 ENCOUNTER — TELEPHONE (OUTPATIENT)
Dept: FAMILY MEDICINE CLINIC | Facility: CLINIC | Age: 62
End: 2022-03-18

## 2022-03-18 NOTE — TELEPHONE ENCOUNTER
Caller: Eve Garrett    Relationship to patient: Self    Best call back number: [phone]    Date of exposure: FAMILY HAD COVID 1ST    Date of positive COVID19 test: 3/17/22    Date if possible COVID19 exposure: N/A    COVID19 symptoms: SORE THROAT, CONGESTION, FEVER, HEAD ACHE,     Date of initial quarantine: 3/17/22    Additional information or concerns:     What is the patients preferred pharmacy:[facility] [address] - [phone] [facility] [phone]

## 2022-03-18 NOTE — TELEPHONE ENCOUNTER
I spoke to patient she is not having any SOA. Just the symptoms listed below. She does not need anything. She said she would have never thought she was POS that she feels that fine. She was just stating why she had to cancel today's visit.    I advised pushing the fluids. I also advised that since this was the weekend if she ended up with SOB not to hesitate to go to ER.

## 2022-03-22 ENCOUNTER — OFFICE VISIT (OUTPATIENT)
Dept: CARDIOLOGY | Facility: CLINIC | Age: 62
End: 2022-03-22

## 2022-03-22 VITALS
HEART RATE: 65 BPM | BODY MASS INDEX: 43.15 KG/M2 | HEIGHT: 65 IN | SYSTOLIC BLOOD PRESSURE: 147 MMHG | DIASTOLIC BLOOD PRESSURE: 83 MMHG | OXYGEN SATURATION: 92 % | WEIGHT: 259 LBS

## 2022-03-22 DIAGNOSIS — R07.2 PRECORDIAL PAIN: Primary | ICD-10-CM

## 2022-03-22 DIAGNOSIS — R06.02 SHORTNESS OF BREATH: ICD-10-CM

## 2022-03-22 DIAGNOSIS — I10 PRIMARY HYPERTENSION: ICD-10-CM

## 2022-03-22 DIAGNOSIS — E78.00 PURE HYPERCHOLESTEROLEMIA: ICD-10-CM

## 2022-03-22 PROCEDURE — 93010 ELECTROCARDIOGRAM REPORT: CPT | Performed by: INTERNAL MEDICINE

## 2022-03-22 PROCEDURE — 99203 OFFICE O/P NEW LOW 30 MIN: CPT | Performed by: INTERNAL MEDICINE

## 2022-03-22 RX ORDER — OCTREOTIDE ACETATE 200 UG/ML
200 INJECTION INTRAVENOUS DAILY
COMMUNITY
End: 2023-01-24

## 2022-03-22 RX ORDER — DICYCLOMINE HCL 20 MG
20 TABLET ORAL EVERY 6 HOURS
COMMUNITY
End: 2023-01-24

## 2022-03-22 NOTE — PROGRESS NOTES
Subjective:     Encounter Date:03/22/2022      Patient ID: Eve Garrett is a 61 y.o. female.    Chief Complaint:  History of Present Illness 61-year-old white female with history of hypertension hyperlipidemia and family history of coronary disease presents to my office for a new consultation.  Patient has been having symptoms of chest pain or shortness of breath for the last several months.  Chest pain is mostly substernal without any radiation but also with shortness of breath.  No complains any PND orthopnea.  No palpitation dizziness syncope.  She has some swelling of the feet.  She is taking her medicines regularly.  She had cancer surgery a year ago and she thought she had an echo and a stress Myoview study at that time.    The following portions of the patient's history were reviewed and updated as appropriate: allergies, current medications, past family history, past medical history, past social history, past surgical history and problem list.  Past Medical History:   Diagnosis Date   • Allergic rhinitis    • Anemia 04/2020   • Asthma 11/4/2011   • Calcaneal spur of foot, right 02/20/2021    XRAY AT [facility]   • Cancer (HCC)     Appendcele mucous neoplasm   • Constipation 7/11/2012   • Contact dermatitis 7/16/2015   • Depression 5/28/2013   • Disease of thyroid gland     HYPOTHYROIDISM   • Diverticulitis 2/1/2012   • Dyspnea 07/13/2020    SEEN AT [facility]   • Gastroesophageal reflux disease 5/28/2013   • Hyperlipidemia    • MRSA infection 04/25/2020   • Myalgia and myositis 9/13/2011   • Osteoarthritis 10/30/2014   • Ovarian cyst 4/23/2013   • Ovarian enlargement, left    • Plantar fasciitis, right 02/20/2021    SEEN AT [facility]   • Seasonal allergies    • Sleep apnea 05/21/2021    NO CPAP     Past Surgical History:   Procedure Laterality Date   • APPENDECTOMY N/A 03/20/2020    LAPAROSCOPIC, DR. WILLIAM CHEADLE AT [place]   • CARDIAC CATHETERIZATION Left 10/02/2009     "No Intervention   • CHOLECYSTECTOMY N/A 02/08/2005    DR. CHRIS LOZA AT [facility]   • COLON RESECTION LEFT Left 2010    D/T RUPTURED DIVERTICULA, DR. DEA CROWE   • COLON RESECTION SMALL BOWEL N/A 04/07/2020    LAPAROSCOPY, MOBILIZATION OF RIGHT COLON, CONVERSION TO OPEN WITH RIGHT TRANSVERSE INCISION, EXTENSIVE LYSIS OF ADHESIONS, AND ILEOCECTOMY, DR. WILLIAM CHEADLE AT [place]   • COLONOSCOPY N/A 03/2012    WITH ILEOSCOPY   • COLONOSCOPY N/A 11/09/2020    A FEW DIVERTICULA IN DESCENDING AND SIGMOID, MILD INTERNAL HEMORRHOIDS, RLQ ANASTAMOSIS, BX: REACTIVE ILEAL MUCOSA WITH MILD CHRONIC ILEITIS, RESCOPE IN 3 YRS, DR. AUSTEN NÚÑEZ AT [facility]   • COLONOSCOPY W/ POLYPECTOMY N/A 02/24/2017    BULBUOUS APPENDIX, POLYPS   • DIAGNOSTIC LAPAROSCOPY N/A 1993    FOR ENDOMETRIOSIS   • DIAGNOSTIC LAPAROSCOPY N/A 1992    FOR ENDOMETRIOSIS   • ENDOSCOPY AND COLONOSCOPY N/A 2009   • ERCP WITH SPHINCTEROTOMY/PAPILLOTOMY N/A 2005    WITH STENT   • FOOT SURGERY Right 2003   • HEMICOLECTOMY Left 06/13/2012    LAPAROSCOPIC LEFT AND SIGMOID HEMICOLECTOMY WITH TRANSVERSE RECTAL ANASTAMOSIS AND LAPAROSCOPIC TAKEDOWN OF SPLENIC FLEXURE, D/T DIVERTICULAR PERFORATION WITH ABSCESS, DR. ABY MCFARLAND AT [facility]   • HYSTERECTOMY N/A 1992    ROZINA WITH BSO     /83 (BP Location: Left arm, Patient Position: Sitting)   Pulse 65   Ht 165.1 cm (65\")   Wt 117 kg (259 lb)   SpO2 92%   BMI 43.10 kg/m²   Family History   Problem Relation Age of Onset   • Cancer Mother    • Liver cancer Mother    • Basal cell carcinoma Mother    • Diabetes Mother    • Cancer Father    • Lung cancer Father    • Hypertension Father    • Celiac disease Father    • Heart disease Father    • Diverticulitis Sister    • Hypertension Sister    • Cancer Brother         BLADDER CANCER   • Cancer Brother    • Stomach cancer Brother    • Cancer Maternal Aunt    • Ovarian cancer Maternal Aunt    • Cancer Paternal Aunt    • Breast cancer Paternal Aunt  "   • Cancer Paternal Uncle    • Stomach cancer Paternal Uncle    • Heart disease Maternal Grandmother    • Heart disease Paternal Grandmother    • Heart failure Paternal Grandmother        Current Outpatient Medications:   •  albuterol sulfate  (90 Base) MCG/ACT inhaler, Inhale 2 puffs Every 4 (Four) Hours As Needed for Wheezing., Disp: 3 g, Rfl: 3  •  atorvastatin (LIPITOR) 20 MG tablet, Take 2 tablets by mouth Every Night., Disp: 90 tablet, Rfl: 3  •  dicyclomine (BENTYL) 20 MG tablet, Take 20 mg by mouth Every 6 (Six) Hours., Disp: , Rfl:   •  icosapent ethyl (Vascepa) 1 g capsule capsule, Take 2 g by mouth 2 (Two) Times a Day With Meals., Disp: 360 capsule, Rfl: 3  •  lisinopril (PRINIVIL,ZESTRIL) 40 MG tablet, Take 1 tablet by mouth Daily., Disp: 90 tablet, Rfl: 3  •  metoprolol tartrate (LOPRESSOR) 50 MG tablet, Take 1 tablet by mouth 2 (Two) Times a Day., Disp: 180 tablet, Rfl: 3  •  multivitamin (THERAGRAN) tablet tablet, Take 1 tablet by mouth Daily., Disp: , Rfl:   •  octreotide (sandoSTATIN) 200 MCG/ML injection, 3 (Three) Times a Day., Disp: , Rfl:   •  omeprazole (priLOSEC) 40 MG capsule, Take 1 capsule by mouth Daily., Disp: 90 capsule, Rfl: 1  •  tiotropium (Spiriva HandiHaler) 18 MCG per inhalation capsule, Place 1 capsule into inhaler and inhale Daily., Disp: 90 capsule, Rfl: 1  •  Triamcinolone Acetonide (NASACORT AQ NA), into the nostril(s) as directed by provider., Disp: , Rfl:   Allergies   Allergen Reactions   • Promethazine Anxiety and Hallucinations     AKA PHENERGAN, Delusions, hallucinations     Social History     Socioeconomic History   • Marital status:    Tobacco Use   • Smoking status: Never Smoker   • Smokeless tobacco: Never Used   Vaping Use   • Vaping Use: Never used   Substance and Sexual Activity   • Alcohol use: Yes     Alcohol/week: 3.0 standard drinks     Types: 1 Glasses of wine, 1 Cans of beer, 1 Shots of liquor per week     Comment: MODERATE AMT   • Drug use:  Never   • Sexual activity: Defer     Birth control/protection: Post-menopausal, Surgical     Review of Systems   Constitutional: Positive for malaise/fatigue. Negative for fever.   HENT: Negative for ear pain and nosebleeds.    Eyes: Negative for blurred vision and double vision.   Cardiovascular: Positive for chest pain and leg swelling. Negative for dyspnea on exertion and palpitations.   Respiratory: Positive for shortness of breath. Negative for cough.    Skin: Negative for rash.   Musculoskeletal: Negative for joint pain.   Gastrointestinal: Positive for nausea. Negative for abdominal pain and vomiting.   Neurological: Positive for headaches. Negative for dizziness, focal weakness and light-headedness.   Psychiatric/Behavioral: Negative for depression. The patient is not nervous/anxious.    All other systems reviewed and are negative.             Objective:     Constitutional:       Appearance: Well-developed.   Eyes:      General: No scleral icterus.     Conjunctiva/sclera: Conjunctivae normal.   HENT:      Head: Normocephalic and atraumatic.   Neck:      Vascular: No carotid bruit or JVD.   Pulmonary:      Effort: Pulmonary effort is normal.      Breath sounds: Normal breath sounds. No wheezing. No rales.   Cardiovascular:      Normal rate. Regular rhythm.   Pulses:     Intact distal pulses.   Abdominal:      General: Bowel sounds are normal.      Palpations: Abdomen is soft.   Musculoskeletal:      Cervical back: Normal range of motion and neck supple. Skin:     General: Skin is warm and dry.      Findings: No rash.   Neurological:      Mental Status: Alert.       Procedures    Lab Review:         MDM #1 chest pain with shortness of breath  Patient has atypical symptoms and has risk factors for coronary disease and has a normal EKG  Patient states that she had a stress Myoview study last year and hence I will look at the records before I perform any other further testing  2.  Hypertension excellent patient  blood pressure currently stable on metoprolol lisinopril  3.  Hyperlipidemia  Patient is on statins and the lipid levels are well within normal limits.      Patient's previous medical records, labs, and EKG were reviewed and discussed with the patient at today's visit.

## 2022-03-23 RX ORDER — OMEPRAZOLE 40 MG/1
40 CAPSULE, DELAYED RELEASE ORAL DAILY
Qty: 90 CAPSULE | Refills: 1 | Status: SHIPPED | OUTPATIENT
Start: 2022-03-23 | End: 2022-08-19 | Stop reason: SDUPTHER

## 2022-03-23 NOTE — TELEPHONE ENCOUNTER
rx request - patient also requesting fluticasone prop spray 50mcg. Not on patient medication list.

## 2022-03-24 DIAGNOSIS — J30.9 ALLERGIC RHINITIS, UNSPECIFIED SEASONALITY, UNSPECIFIED TRIGGER: ICD-10-CM

## 2022-03-24 DIAGNOSIS — J30.2 SEASONAL ALLERGIES: Primary | ICD-10-CM

## 2022-03-24 RX ORDER — FLUTICASONE PROPIONATE 50 MCG
2 SPRAY, SUSPENSION (ML) NASAL DAILY
Qty: 3 G | Refills: 3 | Status: SHIPPED | OUTPATIENT
Start: 2022-03-24 | End: 2022-08-19 | Stop reason: SDUPTHER

## 2022-03-24 RX ORDER — FLUTICASONE PROPIONATE 50 MCG
2 SPRAY, SUSPENSION (ML) NASAL DAILY
COMMUNITY
End: 2022-03-24 | Stop reason: SDUPTHER

## 2022-03-24 NOTE — TELEPHONE ENCOUNTER
PATIENT CALLED STATING THAT STILL MISSING fluticasone prop spray 50mcg. Not on patient medication list.   JACLYN [facility] - [place], [place] - [address] - [phone]  - [phone] FX

## 2022-04-01 ENCOUNTER — OFFICE VISIT (OUTPATIENT)
Dept: FAMILY MEDICINE CLINIC | Facility: CLINIC | Age: 62
End: 2022-04-01

## 2022-04-01 VITALS
TEMPERATURE: 98 F | OXYGEN SATURATION: 98 % | WEIGHT: 261.2 LBS | HEIGHT: 65 IN | BODY MASS INDEX: 43.52 KG/M2 | SYSTOLIC BLOOD PRESSURE: 154 MMHG | HEART RATE: 88 BPM | DIASTOLIC BLOOD PRESSURE: 99 MMHG

## 2022-04-01 DIAGNOSIS — K59.00 CONSTIPATION, UNSPECIFIED CONSTIPATION TYPE: ICD-10-CM

## 2022-04-01 DIAGNOSIS — E55.9 VITAMIN D DEFICIENCY: ICD-10-CM

## 2022-04-01 DIAGNOSIS — R55 SYNCOPE AND COLLAPSE: ICD-10-CM

## 2022-04-01 DIAGNOSIS — M72.2 PLANTAR FASCIITIS, RIGHT: ICD-10-CM

## 2022-04-01 DIAGNOSIS — G47.30 OBSERVED SLEEP APNEA: ICD-10-CM

## 2022-04-01 DIAGNOSIS — E07.9 DISEASE OF THYROID GLAND: ICD-10-CM

## 2022-04-01 DIAGNOSIS — D37.3 LOW GRADE MUCINOUS NEOPLASM OF APPENDIX: ICD-10-CM

## 2022-04-01 DIAGNOSIS — E78.00 PURE HYPERCHOLESTEROLEMIA: ICD-10-CM

## 2022-04-01 DIAGNOSIS — I10 ESSENTIAL HYPERTENSION: ICD-10-CM

## 2022-04-01 DIAGNOSIS — K21.9 GASTROESOPHAGEAL REFLUX DISEASE WITHOUT ESOPHAGITIS: ICD-10-CM

## 2022-04-01 DIAGNOSIS — R10.9 ABDOMINAL PAIN, UNSPECIFIED ABDOMINAL LOCATION: Primary | ICD-10-CM

## 2022-04-01 DIAGNOSIS — J45.909 ASTHMA, UNSPECIFIED ASTHMA SEVERITY, UNSPECIFIED WHETHER COMPLICATED, UNSPECIFIED WHETHER PERSISTENT: ICD-10-CM

## 2022-04-01 DIAGNOSIS — F32.A DEPRESSION, UNSPECIFIED DEPRESSION TYPE: ICD-10-CM

## 2022-04-01 DIAGNOSIS — E66.01 CLASS 3 SEVERE OBESITY DUE TO EXCESS CALORIES WITHOUT SERIOUS COMORBIDITY WITH BODY MASS INDEX (BMI) OF 40.0 TO 44.9 IN ADULT: ICD-10-CM

## 2022-04-01 DIAGNOSIS — D64.9 ANEMIA, UNSPECIFIED TYPE: ICD-10-CM

## 2022-04-01 PROCEDURE — 99214 OFFICE O/P EST MOD 30 MIN: CPT | Performed by: PREVENTIVE MEDICINE

## 2022-04-01 NOTE — PROGRESS NOTES
"Subjective   Eve Garrett is a 61 y.o. female presents for   Chief Complaint   Patient presents with   • Hyperlipidemia   • Hypertension     3 month f/u not fasting     Patient presents today for follow-up of multiple chronic health conditions most of which are stable.  She has lost her gastroenterologist and we will order the testing that [place] was requesting in April so that they will be available to Dr. Smith in May.  Blood pressure was not controlled today so she will home monitor.  Patient is getting her sleep study soon has not had any more passout's also is being referred for a new problem which is plantar fasciitis of the right foot to Dr. Lagos she may require some injections as she has been doing all of the icing and wearing of arch supports.  Finally her constipation is controlled as long she takes her injection she was late on taking her last injection and she is just now beginning to clear up her constipation.  Health Maintenance Due   Topic Date Due   • TDAP/TD VACCINES (1 - Tdap) 09/12/2008   • ZOSTER VACCINE (2 of 2) 11/16/2021       History of Present Illness     Vitals:    04/01/22 1358 04/01/22 1402   BP: 140/82 154/99   BP Location: Right arm Left arm   Patient Position: Sitting Sitting   Cuff Size: Large Adult Large Adult   Pulse: 88    Temp: 98 °F (36.7 °C)    TempSrc: Temporal    SpO2: 98%    Weight: 118 kg (261 lb 3.2 oz)    Height: 165.1 cm (65\")      Body mass index is 43.47 kg/m².    Current Outpatient Medications on File Prior to Visit   Medication Sig Dispense Refill   • albuterol sulfate  (90 Base) MCG/ACT inhaler Inhale 2 puffs Every 4 (Four) Hours As Needed for Wheezing. 3 g 3   • atorvastatin (LIPITOR) 20 MG tablet Take 2 tablets by mouth Every Night. 90 tablet 3   • dicyclomine (BENTYL) 20 MG tablet Take 20 mg by mouth Every 6 (Six) Hours.     • fluticasone (FLONASE) 50 MCG/ACT nasal spray 2 sprays into the nostril(s) as directed by provider Daily. 3 g 3   • icosapent " ethyl (Vascepa) 1 g capsule capsule Take 2 g by mouth 2 (Two) Times a Day With Meals. 360 capsule 3   • lisinopril (PRINIVIL,ZESTRIL) 40 MG tablet Take 1 tablet by mouth Daily. 90 tablet 3   • metoprolol tartrate (LOPRESSOR) 50 MG tablet Take 1 tablet by mouth 2 (Two) Times a Day. 180 tablet 3   • multivitamin (THERAGRAN) tablet tablet Take 1 tablet by mouth Daily.     • octreotide (sandoSTATIN) 200 MCG/ML injection 200 mcg Daily.     • omeprazole (priLOSEC) 40 MG capsule Take 1 capsule by mouth Daily. 90 capsule 1   • tiotropium (Spiriva HandiHaler) 18 MCG per inhalation capsule Place 1 capsule into inhaler and inhale Daily. 90 capsule 1     No current facility-administered medications on file prior to visit.       The following portions of the patient's history were reviewed and updated as appropriate: allergies, current medications, past family history, past medical history, past social history, past surgical history and problem list.    Review of Systems   Gastrointestinal: Positive for abdominal pain and constipation.   Musculoskeletal: Positive for arthralgias and gait problem.   Psychiatric/Behavioral: Positive for sleep disturbance.       Objective   Physical Exam  Vitals reviewed.   Constitutional:       General: She is not in acute distress.     Appearance: She is well-developed. She is obese. She is not ill-appearing or toxic-appearing.   HENT:      Head: Normocephalic and atraumatic.      Right Ear: Tympanic membrane, ear canal and external ear normal.      Left Ear: Tympanic membrane, ear canal and external ear normal.      Nose: Nose normal.   Eyes:      Extraocular Movements: Extraocular movements intact.      Conjunctiva/sclera: Conjunctivae normal.      Pupils: Pupils are equal, round, and reactive to light.   Cardiovascular:      Rate and Rhythm: Normal rate and regular rhythm.      Heart sounds: Normal heart sounds.   Pulmonary:      Effort: Pulmonary effort is normal.      Breath sounds: Normal  breath sounds.   Abdominal:      General: Bowel sounds are normal. There is no distension.      Palpations: Abdomen is soft. There is no mass.      Tenderness: There is no abdominal tenderness.   Musculoskeletal:         General: Tenderness present.      Cervical back: Neck supple.   Skin:     General: Skin is warm.   Neurological:      Mental Status: She is alert and oriented to person, place, and time.      Gait: Gait abnormal.   Psychiatric:         Mood and Affect: Mood normal.         Behavior: Behavior normal.       PHQ-9 Total Score:      Assessment/Plan   Diagnoses and all orders for this visit:    1. Abdominal pain, unspecified abdominal location (Primary)  Comments:  Pain yesterday-no bowel treatment due tonot supply ok today.  Keep appointment with Dr. Collins    2. Anemia, unspecified type  Comments:  No known blood loss  Orders:  -     CBC Auto Differential    3. Asthma, unspecified asthma severity, unspecified whether complicated, unspecified whether persistent  Comments:  Asthma is controlled.    4. Constipation, unspecified constipation type  Comments:  OK with injection    5. Depression, unspecified depression type  Comments:  controlled.  No HI or SI  Orders:  -     Vitamin B12    6. Disease of thyroid gland  Comments:  Dry skin but no increase and some hair loss  Orders:  -     TSH    7. Essential hypertension  Comments:  Not cpntrolled will  home monitor  Orders:  -     Comprehensive Metabolic Panel    8. Gastroesophageal reflux disease without esophagitis  Comments:  Takes omeprazole and controlled  Orders:  -     Magnesium    9. Pure hypercholesterolemia  Comments:  Trying to eat less sat fats  Orders:  -     Lipid Panel    10. Observed sleep apnea  Comments:  Getting sleep study soon    11. Syncope and collapse  Comments:  None recently    12. Vitamin D deficiency  Comments:  Doesn't take  Orders:  -     Vitamin D 25 Hydroxy    13. 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 Ellis is no longer with [facility] she is going to see Dr. Smith but not until May will order cancer tumor markers CT of the abdomen pelvis and chest so that th  Orders:  -     Cancer Antigen 19-9; Future  -     ; Future  -     CEA; Future  -     C-reactive Protein; Future  -     CT Chest With Contrast; Future  -     CT Abdomen Pelvis With Contrast; Future    15. Class 3 severe obesity due to excess calories without serious comorbidity with body mass index (BMI) of 40.0 to 44.9 in adult (HCC)        Patient Instructions     Health Maintenance Due   Topic Date Due   • TDAP/TD VACCINES (1 - Tdap) 09/12/2008   • ZOSTER VACCINE (2 of 2) 11/16/2021    Check blood pressure cuff for accuracy and send 10 blood pressures over 2 weeks.  Watch sodium, alcohol and weight     Come in Monday for Shingrix and will get Td at pharmacy

## 2022-04-04 ENCOUNTER — CLINICAL SUPPORT (OUTPATIENT)
Dept: FAMILY MEDICINE CLINIC | Facility: CLINIC | Age: 62
End: 2022-04-04

## 2022-04-04 DIAGNOSIS — D37.3 LOW GRADE MUCINOUS NEOPLASM OF APPENDIX: ICD-10-CM

## 2022-04-04 LAB
25(OH)D3 SERPL-MCNC: 18 NG/ML (ref 30–100)
ALBUMIN SERPL-MCNC: 3.8 G/DL (ref 3.5–5.2)
ALBUMIN/GLOB SERPL: 1.1 G/DL
ALP SERPL-CCNC: 114 U/L (ref 39–117)
ALT SERPL W P-5'-P-CCNC: 40 U/L (ref 1–33)
ANION GAP SERPL CALCULATED.3IONS-SCNC: 10.2 MMOL/L (ref 5–15)
AST SERPL-CCNC: 36 U/L (ref 1–32)
BASOPHILS # BLD AUTO: 0.06 10*3/MM3 (ref 0–0.2)
BASOPHILS NFR BLD AUTO: 1 % (ref 0–1.5)
BILIRUB SERPL-MCNC: 0.3 MG/DL (ref 0–1.2)
BUN SERPL-MCNC: 11 MG/DL (ref 8–23)
BUN/CREAT SERPL: 14.9 (ref 7–25)
CALCIUM SPEC-SCNC: 9 MG/DL (ref 8.6–10.5)
CANCER AG125 SERPL QL: 12.4 U/ML (ref 0–38.1)
CANCER AG19-9 SERPL-ACNC: 4.5 U/ML
CEA SERPL-MCNC: 1.01 NG/ML
CHLORIDE SERPL-SCNC: 106 MMOL/L (ref 98–107)
CHOLEST SERPL-MCNC: 130 MG/DL (ref 0–200)
CO2 SERPL-SCNC: 21.8 MMOL/L (ref 22–29)
CREAT SERPL-MCNC: 0.74 MG/DL (ref 0.57–1)
CRP SERPL-MCNC: 1.06 MG/DL (ref 0–0.5)
DEPRECATED RDW RBC AUTO: 41.8 FL (ref 37–54)
EGFRCR SERPLBLD CKD-EPI 2021: 92.8 ML/MIN/1.73
EOSINOPHIL # BLD AUTO: 0.18 10*3/MM3 (ref 0–0.4)
EOSINOPHIL NFR BLD AUTO: 3 % (ref 0.3–6.2)
ERYTHROCYTE [DISTWIDTH] IN BLOOD BY AUTOMATED COUNT: 15.2 % (ref 12.3–15.4)
GLOBULIN UR ELPH-MCNC: 3.4 GM/DL
GLUCOSE SERPL-MCNC: 104 MG/DL (ref 65–99)
HCT VFR BLD AUTO: 38.2 % (ref 34–46.6)
HDLC SERPL-MCNC: 34 MG/DL (ref 40–60)
HGB BLD-MCNC: 12.1 G/DL (ref 12–15.9)
IMM GRANULOCYTES # BLD AUTO: 0.01 10*3/MM3 (ref 0–0.05)
IMM GRANULOCYTES NFR BLD AUTO: 0.2 % (ref 0–0.5)
LDLC SERPL CALC-MCNC: 69 MG/DL (ref 0–100)
LDLC/HDLC SERPL: 1.91 {RATIO}
LYMPHOCYTES # BLD AUTO: 1.76 10*3/MM3 (ref 0.7–3.1)
LYMPHOCYTES NFR BLD AUTO: 29.1 % (ref 19.6–45.3)
MAGNESIUM SERPL-MCNC: 2.1 MG/DL (ref 1.6–2.4)
MCH RBC QN AUTO: 24.7 PG (ref 26.6–33)
MCHC RBC AUTO-ENTMCNC: 31.7 G/DL (ref 31.5–35.7)
MCV RBC AUTO: 78.1 FL (ref 79–97)
MONOCYTES # BLD AUTO: 0.88 10*3/MM3 (ref 0.1–0.9)
MONOCYTES NFR BLD AUTO: 14.6 % (ref 5–12)
NEUTROPHILS NFR BLD AUTO: 3.15 10*3/MM3 (ref 1.7–7)
NEUTROPHILS NFR BLD AUTO: 52.1 % (ref 42.7–76)
NRBC BLD AUTO-RTO: 0 /100 WBC (ref 0–0.2)
PLATELET # BLD AUTO: 294 10*3/MM3 (ref 140–450)
PMV BLD AUTO: 10.3 FL (ref 6–12)
POTASSIUM SERPL-SCNC: 4.5 MMOL/L (ref 3.5–5.2)
PROT SERPL-MCNC: 7.2 G/DL (ref 6–8.5)
RBC # BLD AUTO: 4.89 10*6/MM3 (ref 3.77–5.28)
SODIUM SERPL-SCNC: 138 MMOL/L (ref 136–145)
TRIGL SERPL-MCNC: 156 MG/DL (ref 0–150)
TSH SERPL DL<=0.05 MIU/L-ACNC: 2.56 UIU/ML (ref 0.27–4.2)
VIT B12 BLD-MCNC: 501 PG/ML (ref 211–946)
VLDLC SERPL-MCNC: 27 MG/DL (ref 5–40)
WBC NRBC COR # BLD: 6.04 10*3/MM3 (ref 3.4–10.8)

## 2022-04-04 PROCEDURE — 82378 CARCINOEMBRYONIC ANTIGEN: CPT | Performed by: PREVENTIVE MEDICINE

## 2022-04-04 PROCEDURE — 80053 COMPREHEN METABOLIC PANEL: CPT | Performed by: PREVENTIVE MEDICINE

## 2022-04-04 PROCEDURE — 36415 COLL VENOUS BLD VENIPUNCTURE: CPT | Performed by: PREVENTIVE MEDICINE

## 2022-04-04 PROCEDURE — 84443 ASSAY THYROID STIM HORMONE: CPT | Performed by: PREVENTIVE MEDICINE

## 2022-04-04 PROCEDURE — 86304 IMMUNOASSAY TUMOR CA 125: CPT | Performed by: PREVENTIVE MEDICINE

## 2022-04-04 PROCEDURE — 85025 COMPLETE CBC W/AUTO DIFF WBC: CPT | Performed by: PREVENTIVE MEDICINE

## 2022-04-04 PROCEDURE — 86140 C-REACTIVE PROTEIN: CPT | Performed by: PREVENTIVE MEDICINE

## 2022-04-04 PROCEDURE — 80061 LIPID PANEL: CPT | Performed by: PREVENTIVE MEDICINE

## 2022-04-04 PROCEDURE — 83735 ASSAY OF MAGNESIUM: CPT | Performed by: PREVENTIVE MEDICINE

## 2022-04-04 PROCEDURE — 82306 VITAMIN D 25 HYDROXY: CPT | Performed by: PREVENTIVE MEDICINE

## 2022-04-04 PROCEDURE — 82607 VITAMIN B-12: CPT | Performed by: PREVENTIVE MEDICINE

## 2022-04-04 PROCEDURE — 86301 IMMUNOASSAY TUMOR CA 19-9: CPT | Performed by: PREVENTIVE MEDICINE

## 2022-04-04 NOTE — PROGRESS NOTES
Venipuncture Blood Specimen Collection  Venipuncture performed in left arm by Torri Byers MA with good hemostasis. Patient tolerated the procedure well without complications.   04/04/22   Torri Byers MA

## 2022-04-05 ENCOUNTER — TELEPHONE (OUTPATIENT)
Dept: FAMILY MEDICINE CLINIC | Facility: CLINIC | Age: 62
End: 2022-04-05

## 2022-04-05 NOTE — PROGRESS NOTES
All cancer antigens were negative.  C-reactive protein which does sometimes monitor inflammation was elevated and we will continue to follow.  Vitamin D was low so we suggest that she increase over-the-counter use of vitamin D.  Glucose was 104 so watch carbohydrates and increase walking she had some mild liver enzyme elevation which we will continue to monitor she should avoid Tylenol and ibuprofen Aleve Motrin red blood cells were slightly small so make sure she is getting sufficient iron in her diet.  Keep follow-up with [facility].

## 2022-04-05 NOTE — TELEPHONE ENCOUNTER
----- Message from Nunu Kingston MD sent at 4/5/2022  7:40 AM EDT -----    HUB TO READ    All cancer antigens were negative.  C-reactive protein which does sometimes monitor inflammation was elevated and we will continue to follow.  Vitamin D was low so we suggest that she increase over-the-counter use of vitamin D.  Glucose was 104 so watch carbohydrates and increase walking she had some mild liver enzyme elevation which we will continue to monitor she should avoid Tylenol and ibuprofen Aleve Motrin red blood cells were slightly small so make sure she is getting sufficient iron in her diet.  Keep follow-up with [facility].

## 2022-04-11 ENCOUNTER — TELEPHONE (OUTPATIENT)
Dept: FAMILY MEDICINE CLINIC | Facility: CLINIC | Age: 62
End: 2022-04-11

## 2022-04-11 DIAGNOSIS — M72.2 BILATERAL PLANTAR FASCIITIS: Primary | ICD-10-CM

## 2022-04-11 NOTE — TELEPHONE ENCOUNTER
PT CALLED office saying Dr Lagos said she needs her referral to say bilateral so she can get both feet checked out.  I guess they couldn't or wouldn't get her in for both, she states she has planter fasciatus in both feet. Please advise.   Palliative Care:      78-year-old with female  institutionalized lady from nursing home at [facility] , who came to the emergency room following a fall. She is usually wheelchair-bound and she apparently tried to get up and fell on her own and she has developed injury with fracture. She does have a known history of total knee prosthesis and this fracture happened around the prosthesis, also known as periprosthetic fracture of the right femur. Patient has been seen by ortho--decision made for comfort measures and no operative treatment. Patient  has a very complex fracture and problem. She has osteopenic or osteoporotic (no DE XA scan noted) bone. The fracture is comminuted and appears to extend into/behind the flanges of the femoral component of her FTKA, which means that the femoral component may be unstable. She appears to have flexion contractures of her bilateral knees. CT Cervical Spine WOR Contrast   Final Result   No acute abnormality of the cervical spine.           CT Head WOR Contrast   Final Result   Moderate cerebral cortical atrophy was noted.   The ventricles were enlarged,   out of proportion to the degree of cerebral cortical atrophy, consider normal   pressure hydrocephalus.       No hemorrhage or acute infarction.       Large confluent areas of old small vessel infarction, ischemia or gliosis   from prior ischemic events in the bilateral centrum semi ovale, bilateral   corona radiata, bilateral periventricular white matter and bilateral forceps   minor.       No significant change has occurred from 10/01/2018.           XR CHEST PORTABLE   Preliminary Result   No acute cardiopulmonary process.           XR FEMUR RIGHT (MIN 2 VIEWS)   Final Result   Right distal femur periprosthetic fracture.               Past Medical History:   has a past medical history of AD (Alzheimer's disease) ([address] Anemia, Arthritis, Bronchial asthma, Dementia ([address] GERD (gastroesophageal reflux disease), Hyperlipidemia, Osteopenia, and Overactive bladder. Past Surgical History:   has a past surgical history that includes Cataract removal; Retinal detachment surgery; Upper gastrointestinal endoscopy (1990); Colonoscopy (2002); Total knee arthroplasty; Total hip arthroplasty (5/2008); Carpal tunnel release; and joint replacement. Advance Directives:      Code status is DNR CC    Problem Severity: Pain/Other Symptoms:      Has fracture pain and does have Dilaudid available for same. Bed Mobility/Toile ting/Transfer:      Patient needs assistance with ALS    Performance Status:      Patient is non ambulatory    Symptom Assessment: Appetite/Nausea/Bowels/Fatigue:      Has severe malnutrition. Has fatigue. Social History:   reports that she has never smoked. She has never used smokeless tobacco. She reports current alcohol use. She reports that she does not use drugs. Family History:  family history includes Bleeding Prob in her mother; Mental Illness in her father. Psychological/Spiritual:       Patient is a . She has two sons. One son lives out of town. Family Discussion:      Patient  is resting. Appears comfortable. Plans for discharge at 1430 back to [facility]. Left message for son Fredy Morrissey. Medications for comfort sent to [facility]. [address] to see patient as soon as possible. She has been active with hospice at [facility]. Informed [facility] of new fracture. Goal of care is comfort. Faxed new referral to [facility]. Discussed with Charley Hernandez RN--will benefit from pre medication of opioid prior to discharge back to [facility].

## 2022-04-18 ENCOUNTER — OFFICE VISIT (OUTPATIENT)
Dept: SLEEP MEDICINE | Facility: CLINIC | Age: 62
End: 2022-04-18

## 2022-04-18 VITALS
OXYGEN SATURATION: 95 % | SYSTOLIC BLOOD PRESSURE: 146 MMHG | HEIGHT: 65 IN | WEIGHT: 261 LBS | DIASTOLIC BLOOD PRESSURE: 68 MMHG | BODY MASS INDEX: 43.49 KG/M2 | HEART RATE: 72 BPM

## 2022-04-18 DIAGNOSIS — G47.10 HYPERSOMNIA: ICD-10-CM

## 2022-04-18 DIAGNOSIS — E66.01 MORBID OBESITY: ICD-10-CM

## 2022-04-18 DIAGNOSIS — G47.8 NON-RESTORATIVE SLEEP: ICD-10-CM

## 2022-04-18 DIAGNOSIS — R06.83 SNORING: ICD-10-CM

## 2022-04-18 DIAGNOSIS — G47.30 OBSERVED SLEEP APNEA: Primary | ICD-10-CM

## 2022-04-18 PROCEDURE — G0463 HOSPITAL OUTPT CLINIC VISIT: HCPCS

## 2022-04-18 PROCEDURE — 99214 OFFICE O/P EST MOD 30 MIN: CPT | Performed by: FAMILY MEDICINE

## 2022-04-18 NOTE — PROGRESS NOTES
[facility] Note     Chief Complaint: Sleep apnea unspecified type    Primary Care Physician: Nunu Kingston MD    Eve Garrett is a 61 y.o.female  was last seen at [facility] sleep lab: 9/7/2021.  New patient to me.  Originally presented in 2021 with snoring nonrestorative sleep witnessed apneas for about 10 years; per records sleep study is more than 10 years prior however never used CPAP.  Sleep study was ordered at that time.  This was not done.  [facility] sleep [facility].  Due to insurance requirements patient will need face-to-face visit before study can be scheduled.  Symptoms have not changed.  Morbidly obese BMI 43.4.  Bedtime 10 PM wake time 7 AM ESS of 13.  No tobacco use 1-2 alcoholic drinks per week 2 cups of coffee a day no energy drinks.      Current Medications:    Current Outpatient Medications:   •  albuterol sulfate  (90 Base) MCG/ACT inhaler, Inhale 2 puffs Every 4 (Four) Hours As Needed for Wheezing., Disp: 3 g, Rfl: 3  •  atorvastatin (LIPITOR) 20 MG tablet, Take 2 tablets by mouth Every Night., Disp: 90 tablet, Rfl: 3  •  dicyclomine (BENTYL) 20 MG tablet, Take 20 mg by mouth Every 6 (Six) Hours., Disp: , Rfl:   •  fluticasone (FLONASE) 50 MCG/ACT nasal spray, 2 sprays into the nostril(s) as directed by provider Daily., Disp: 3 g, Rfl: 3  •  icosapent ethyl (Vascepa) 1 g capsule capsule, Take 2 g by mouth 2 (Two) Times a Day With Meals., Disp: 360 capsule, Rfl: 3  •  lisinopril (PRINIVIL,ZESTRIL) 40 MG tablet, Take 1 tablet by mouth Daily., Disp: 90 tablet, Rfl: 3  •  metoprolol tartrate (LOPRESSOR) 50 MG tablet, Take 1 tablet by mouth 2 (Two) Times a Day., Disp: 180 tablet, Rfl: 3  •  multivitamin (THERAGRAN) tablet tablet, Take 1 tablet by mouth Daily., Disp: , Rfl:   •  octreotide (sandoSTATIN) 200 MCG/ML injection, 200 mcg Daily., Disp: , Rfl:   •  omeprazole (priLOSEC) 40 MG capsule, Take 1 capsule by mouth Daily.,  "Disp: 90 capsule, Rfl: 1  •  tiotropium (Spiriva HandiHaler) 18 MCG per inhalation capsule, Place 1 capsule into inhaler and inhale Daily., Disp: 90 capsule, Rfl: 1   also entered in Sleep Questionnaire    Patient  has a past medical history of Allergic rhinitis, Anemia (04/2020), Asthma (11/4/2011), Calcaneal spur of foot, right (02/20/2021), Cancer (HCC), Constipation (7/11/2012), Contact dermatitis (7/16/2015), Depression (5/28/2013), Disease of thyroid gland, Diverticulitis (2/1/2012), Dyspnea (07/13/2020), Gastroesophageal reflux disease (5/28/2013), Hyperlipidemia, MRSA infection (04/25/2020), Myalgia and myositis (9/13/2011), Osteoarthritis (10/30/2014), Ovarian cyst (4/23/2013), Ovarian enlargement, left, Plantar fasciitis, right (02/20/2021), Seasonal allergies, and Sleep apnea (05/21/2021).    Social History:    Social History     Socioeconomic History   • Marital status:    Tobacco Use   • Smoking status: Never Smoker   • Smokeless tobacco: Never Used   Vaping Use   • Vaping Use: Never used   Substance and Sexual Activity   • Alcohol use: Yes     Alcohol/week: 3.0 standard drinks     Types: 1 Glasses of wine, 1 Cans of beer, 1 Shots of liquor per week     Comment: MODERATE AMT   • Drug use: Never   • Sexual activity: Defer     Birth control/protection: Post-menopausal, Surgical       Allergies:  Promethazine    Vital Signs:    Vitals:    04/18/22 1417   BP: 146/68   BP Location: Left arm   Patient Position: Sitting   Cuff Size: Adult   Pulse: 72   SpO2: 95%   Weight: 118 kg (261 lb)   Height: 165.1 cm (65\")     Body mass index is 43.43 kg/m².    REVIEW OF SYSTEMS.  Full review of systems available on the intake form which is scanned in the media tab.  The relevant positive are noted below  1. Daytime excessive sleepiness with Lewis[place] Sleepiness Scale :Total score: 13   2. Snoring  3. Nasal congestion dry mouth postnasal drip shortness of breath wheezing morning headache cough acid reflux abdominal " "bloating      Physical exam:  Vitals:    04/18/22 1417   BP: 146/68   BP Location: Left arm   Patient Position: Sitting   Cuff Size: Adult   Pulse: 72   SpO2: 95%   Weight: 118 kg (261 lb)   Height: 165.1 cm (65\")    Body mass index is 43.43 kg/m².    HEENT: Head is atraumatic, normocephalic  Eyes: pupils are round equal and reacting to light and accommodation, conjunctiva normal  Nose: no nasal septal defects or deviation and the nasal passages are clear, no nasal polyps,  Throat: tongue normal, oral airway Mallampati class 3  NECK: , trachea is in the midline, thyroid not enlarged  RESPIRATORY SYSTEM: Regular respirations  CARDIOVASULAR SYSTEM: Regular rate  EXTREMITES: No cyanosis, clubbing  NEUROLOGICAL SYSTEM: Oriented x 3, no gross motor defects, gait normal    Impression:  1. Observed sleep apnea    2. Non-restorative sleep    3. Hypersomnia    4. Snoring    5. Morbid obesity (HCC)      I discussed the pathophysiology of obstructive sleep apnea with the patient.  We discussed the adverse outcomes associated with untreated sleep-disordered breathing.  We discussed treatment modalities of obstructive sleep apnea including CPAP device as well as oral mandibular advancement device. Sleep study will be scheduled to establish definitive diagnosis of sleep disorder breathing.  Weight loss will be strongly beneficial in order to reduce the severity of sleep-disordered breathing.  Patient has narrow oropharyngeal structure.  Caution during activities that require prolonged concentration is strongly advised.  Patient will be notified of sleep study results after sleep study is completed.  If sleep apnea is only mild,  oral mandibular advancement device may be one of the treatment options.  However if sleep apnea is moderately severe, CPAP treatment will be strongly encouraged.  The patient is not opposed to treatment with CPAP device if we confirm significant obstructive sleep apnea on polysomnography. Follow-up home " sleep study.     Weight loss will be strongly beneficial to reduce the severity of sleep-disordered breathing.  Caution during activities that require prolonged concentration is strongly advised if sleepiness returns.    Sunil Ambrocio MD  Sleep Medicine  04/18/22  14:49 EDT

## 2022-04-21 ENCOUNTER — TELEPHONE (OUTPATIENT)
Dept: FAMILY MEDICINE CLINIC | Facility: CLINIC | Age: 62
End: 2022-04-21

## 2022-04-21 NOTE — PROGRESS NOTES
CT of the chest and abdomen with pelvis with contrast= Shows less inflammation in the right lower side no obvious signs of metastatic disease fatty infiltration of the liver and some outpouchings of the colon called diverticulosis without irritation or infection.  Keep follow-up with Dr. Smith.

## 2022-04-21 NOTE — TELEPHONE ENCOUNTER
HUB TO READ:  ----- Message from Nunu Kingston MD sent at 4/21/2022  9:19 AM EDT -----  CT of the chest and abdomen with pelvis with contrast= Shows less inflammation in the right lower side no obvious signs of metastatic disease fatty infiltration of the liver and some outpouchings of the colon called diverticulosis without irritation or infection.  Keep follow-up with Dr. Smith.

## 2022-05-05 ENCOUNTER — OFFICE VISIT (OUTPATIENT)
Dept: PODIATRY | Facility: CLINIC | Age: 62
End: 2022-05-05

## 2022-05-05 VITALS
SYSTOLIC BLOOD PRESSURE: 135 MMHG | HEART RATE: 73 BPM | WEIGHT: 262 LBS | BODY MASS INDEX: 42.11 KG/M2 | HEIGHT: 66 IN | OXYGEN SATURATION: 95 % | DIASTOLIC BLOOD PRESSURE: 82 MMHG

## 2022-05-05 DIAGNOSIS — M79.672 BILATERAL FOOT PAIN: Primary | ICD-10-CM

## 2022-05-05 DIAGNOSIS — M79.671 BILATERAL FOOT PAIN: Primary | ICD-10-CM

## 2022-05-05 DIAGNOSIS — M21.6X2 EQUINUS DEFORMITY OF BOTH FEET: ICD-10-CM

## 2022-05-05 DIAGNOSIS — M72.2 PLANTAR FASCIITIS, BILATERAL: ICD-10-CM

## 2022-05-05 DIAGNOSIS — M21.6X1 EQUINUS DEFORMITY OF BOTH FEET: ICD-10-CM

## 2022-05-05 DIAGNOSIS — M77.42 METATARSALGIA OF BOTH FEET: ICD-10-CM

## 2022-05-05 DIAGNOSIS — M77.41 METATARSALGIA OF BOTH FEET: ICD-10-CM

## 2022-05-05 PROCEDURE — 99203 OFFICE O/P NEW LOW 30 MIN: CPT | Performed by: PODIATRIST

## 2022-05-05 NOTE — PROGRESS NOTES
05/05/2022  Foot and Ankle Surgery - New Patient   Provider: Dr. Jono Lagos DPM  Location: [facility]    Subjective:  Eve Garrett is a 61 y.o. female who presented to the clinic with bilateral foot pain.    Chief Complaint   Patient presents with   • Left Foot - Pain   • Right Foot - Pain   • Establish Care     Dr Hodges 4-1-22       HPI:     The patient reports that she has had chronic bilateral foot pain for almost a year. She states that the pain progressively worsen. She notes that she has tried different shoes, inserts, brace with no improvement. She denies any previous or recent injuries however she had surgery on her right foot in 2004 where they took a bone out of her fifth toe. She reports that it is difficult to get up after setting for long time.    Allergies   Allergen Reactions   • Promethazine Anxiety and Hallucinations     AKA PHENERGAN, Delusions, hallucinations       Past Medical History:   Diagnosis Date   • Allergic rhinitis    • Anemia 04/2020   • Asthma 11/4/2011   • Calcaneal spur of foot, right 02/20/2021    XRAY AT [facility]   • Cancer (HCC)     Appendcele mucous neoplasm   • Constipation 7/11/2012   • Contact dermatitis 7/16/2015   • Depression 5/28/2013   • Disease of thyroid gland     HYPOTHYROIDISM   • Diverticulitis 2/1/2012   • Dyspnea 07/13/2020    SEEN AT [facility] ER   • Gastroesophageal reflux disease 5/28/2013   • Hyperlipidemia    • MRSA infection 04/25/2020   • Myalgia and myositis 9/13/2011   • Osteoarthritis 10/30/2014   • Ovarian cyst 4/23/2013   • Ovarian enlargement, left    • Plantar fasciitis, right 02/20/2021    SEEN AT [facility] ER   • Seasonal allergies    • Sleep apnea 05/21/2021    NO CPAP       Past Surgical History:   Procedure Laterality Date   • APPENDECTOMY N/A 03/20/2020    LAPAROSCOPIC, DR. WILLIAM CHEADLE AT [place]   • CARDIAC CATHETERIZATION Left 10/02/2009    No Intervention   • CHOLECYSTECTOMY N/A 02/08/2005     DR. CHRIS LOZA AT [facility]   • COLON RESECTION LEFT Left 2010    D/T RUPTURED DIVERTICULA, DR. DEA CROWE   • COLON RESECTION SMALL BOWEL N/A 04/07/2020    LAPAROSCOPY, MOBILIZATION OF RIGHT COLON, CONVERSION TO OPEN WITH RIGHT TRANSVERSE INCISION, EXTENSIVE LYSIS OF ADHESIONS, AND ILEOCECTOMY, DR. WILLIAM CHEADLE AT [place]   • COLONOSCOPY N/A 03/2012    WITH ILEOSCOPY   • COLONOSCOPY N/A 11/09/2020    A FEW DIVERTICULA IN DESCENDING AND SIGMOID, MILD INTERNAL HEMORRHOIDS, RLQ ANASTAMOSIS, BX: REACTIVE ILEAL MUCOSA WITH MILD CHRONIC ILEITIS, RESCOPE IN 3 YRS, DR. AUSTEN NÚÑEZ AT [facility]   • COLONOSCOPY W/ POLYPECTOMY N/A 02/24/2017    BULBUOUS APPENDIX, POLYPS   • DIAGNOSTIC LAPAROSCOPY N/A 1993    FOR ENDOMETRIOSIS   • DIAGNOSTIC LAPAROSCOPY N/A 1992    FOR ENDOMETRIOSIS   • ENDOSCOPY AND COLONOSCOPY N/A 2009   • ERCP WITH SPHINCTEROTOMY/PAPILLOTOMY N/A 2005    WITH STENT   • FOOT SURGERY Right 2003   • HEMICOLECTOMY Left 06/13/2012    LAPAROSCOPIC LEFT AND SIGMOID HEMICOLECTOMY WITH TRANSVERSE RECTAL ANASTAMOSIS AND LAPAROSCOPIC TAKEDOWN OF SPLENIC FLEXURE, D/T DIVERTICULAR PERFORATION WITH ABSCESS, DR. ABY MCFARLAND AT [facility]   • HYSTERECTOMY N/A 1992    ROZINA WITH BSO       Family History   Problem Relation Age of Onset   • Cancer Mother    • Liver cancer Mother    • Basal cell carcinoma Mother    • Diabetes Mother    • Cancer Father    • Lung cancer Father    • Hypertension Father    • Celiac disease Father    • Heart disease Father    • Diverticulitis Sister    • Hypertension Sister    • Cancer Brother         BLADDER CANCER   • Cancer Brother    • Stomach cancer Brother    • Cancer Maternal Aunt    • Ovarian cancer Maternal Aunt    • Cancer Paternal Aunt    • Breast cancer Paternal Aunt    • Cancer Paternal Uncle    • Stomach cancer Paternal Uncle    • Heart disease Maternal Grandmother    • Heart disease Paternal Grandmother    • Heart failure Paternal Grandmother        Social  "History     Socioeconomic History   • Marital status:    Tobacco Use   • Smoking status: Never Smoker   • Smokeless tobacco: Never Used   Vaping Use   • Vaping Use: Never used   Substance and Sexual Activity   • Alcohol use: Yes     Alcohol/week: 3.0 standard drinks     Types: 1 Glasses of wine, 1 Cans of beer, 1 Shots of liquor per week     Comment: MODERATE AMT   • Drug use: Never   • Sexual activity: Defer     Birth control/protection: Post-menopausal, Surgical        Current Outpatient Medications on File Prior to Visit   Medication Sig Dispense Refill   • albuterol sulfate  (90 Base) MCG/ACT inhaler Inhale 2 puffs Every 4 (Four) Hours As Needed for Wheezing. 3 g 3   • atorvastatin (LIPITOR) 20 MG tablet Take 2 tablets by mouth Every Night. 90 tablet 3   • B-D 3CC LUER-NAYELI SYR 25GX5/8\" 25G X 5/8\" 3 ML misc      • dicyclomine (BENTYL) 20 MG tablet Take 20 mg by mouth Every 6 (Six) Hours.     • fluticasone (FLONASE) 50 MCG/ACT nasal spray 2 sprays into the nostril(s) as directed by provider Daily. 3 g 3   • icosapent ethyl (Vascepa) 1 g capsule capsule Take 2 g by mouth 2 (Two) Times a Day With Meals. 360 capsule 3   • lisinopril (PRINIVIL,ZESTRIL) 40 MG tablet Take 1 tablet by mouth Daily. 90 tablet 3   • metoprolol tartrate (LOPRESSOR) 50 MG tablet Take 1 tablet by mouth 2 (Two) Times a Day. 180 tablet 3   • multivitamin (THERAGRAN) tablet tablet Take 1 tablet by mouth Daily.     • octreotide (sandoSTATIN) 200 MCG/ML injection 200 mcg Daily.     • omeprazole (priLOSEC) 40 MG capsule Take 1 capsule by mouth Daily. 90 capsule 1   • tiotropium (Spiriva HandiHaler) 18 MCG per inhalation capsule Place 1 capsule into inhaler and inhale Daily. 90 capsule 1     No current facility-administered medications on file prior to visit.       Review of Systems:  General: Denies fever, chills, fatigue, and weakness.  Eyes: Denies vision loss, blurry vision, and excessive redness.  ENT: Denies hearing issues and " "difficulty swallowing.  Cardiovascular: Denies palpitations, chest pain, or syncopal episodes.  Respiratory: Denies shortness of breath, wheezing, and coughing.  GI: Denies abdominal pain, nausea, and vomiting.   : Denies frequency, hematuria, and urgency.  Musculoskeletal: + Bilateral foot pain  Derm: Denies rash, open wounds, or suspicious lesions.  Neuro: Denies headaches, numbness, loss of coordination, and tremors.  Psych: Denies anxiety and depression.  Endocrine: Denies temperature intolerance and changes in appetite.  Heme: Denies bleeding disorders or abnormal bruising.     Objective   /82   Pulse 73   Ht 167.6 cm (66\")   Wt 119 kg (262 lb)   SpO2 95%   BMI 42.29 kg/m²     Foot/Ankle Exam:       General:   Appearance: appears stated age and healthy    Orientation: AAOx3    Affect: appropriate    Gait: antalgic      VASCULAR      Right Foot Vascularity   Normal vascular exam    Dorsalis pedis:  2+  Posterior tibial:  2+  Skin Temperature: warm    Edema Grading:  None  CFT:  < 3 seconds  Pedal Hair Growth:  Present  Varicosities: none       Left Foot Vascularity   Normal vascular exam    Dorsalis pedis:  2+  Posterior tibial:  2+  Skin Temperature: warm    Edema Grading:  None  CFT:  < 3 seconds  Pedal Hair Growth:  Present  Varicosities: none        NEUROLOGIC     Right Foot Neurologic   Light touch sensation:  Normal  Hot/Cold sensation: normal    Achilles reflex:  2+     Left Foot Neurologic   Light touch sensation:  Normal  Hot/cold sensation: normal    Achilles reflex:  2+     MUSCULOSKELETAL      Right Foot Musculoskeletal   Ecchymosis:  None  Tenderness: lesser metatarsophalangeal joints, plantar fascia and plantar heel    Arch:  Normal     Left Foot Musculoskeletal   Ecchymosis:  None  Tenderness: fifth metatarsal base, lesser metatarsophalangeal joints, plantar fascia and plantar heel    Arch:  Normal     MUSCLE STRENGTH     Right Foot Muscle Strength   Normal strength    Foot " dorsiflexion:  5  Foot plantar flexion:  5  Foot inversion:  5  Foot eversion:  5     Left Foot Muscle Strength   Normal strength    Foot dorsiflexion:  5  Foot plantar flexion:  5  Foot inversion:  5  Foot eversion:  5     DERMATOLOGIC     Right Foot Dermatologic   Skin: skin intact       Left Foot Dermatologic   Skin: skin intact        Right Foot Additional Comments Moderate soft tissue rigidity and equinus contracture, bilateral.  Discomfort with palpation involving the forefoot as well as the heel.      Left Foot Additional Comments: Discomfort involving the forefoot as well as the fifth metatarsal base      Assessment/Plan   Diagnoses and all orders for this visit:    1. Bilateral foot pain (Primary)  -     XR Foot 3+ View Bilateral    2. Plantar fasciitis, bilateral    3. Metatarsalgia of both feet    4. Equinus deformity of both feet    Patient is a 61-year-old female that presents with pain involving her feet.  She complains of longstanding issues without any recent injury.  Imaging was independently reviewed showing no obvious fractures or dislocations.  I explained that her symptoms are secondary to increased soft tissue rigidity involving her foot as well as her lower leg.  I think this is causing increased forefoot stress.  We did review the biomechanics and I have suggested that she proceed with a pair of over-the-counter arch supports with a metatarsal pad for forefoot offloading.  We also reviewed appropriate shoes, stretching, and manual therapy exercises.  I have asked that she monitor the areas closely.  We did discuss rice therapy and proper use of OTC anti-inflammatories.  I would like to see her in 4 weeks for reevaluation.  Greater than 30 minutes was spent before, during, and after evaluation for patient care.      Orders Placed This Encounter   Procedures   • XR Foot 3+ View Bilateral     Order Specific Question:   Reason for Exam:     Answer:   [facility] pain room 13     Order Specific Question:    Release to patient     Answer:   Immediate        Note is dictated utilizing voice recognition software. Unfortunately this leads to occasional typographical errors. I apologize in advance if the situation occurs. If questions occur please do not hesitate to call our office.    Transcribed from ambient dictation for MARTHA Lagos DPM by Noman Iqbal.  05/05/22   17:09 EDT    Patient verbalized consent to the visit recording.

## 2022-05-06 ENCOUNTER — PATIENT ROUNDING (BHMG ONLY) (OUTPATIENT)
Dept: PODIATRY | Facility: CLINIC | Age: 62
End: 2022-05-06

## 2022-05-06 NOTE — PROGRESS NOTES
A my chart message has been sent to the patient for PATIENT ROUNDING with [facility]

## 2022-05-24 ENCOUNTER — HOSPITAL ENCOUNTER (OUTPATIENT)
Dept: SLEEP MEDICINE | Facility: HOSPITAL | Age: 62
Discharge: HOME | End: 2022-05-24
Admitting: FAMILY MEDICINE

## 2022-05-24 DIAGNOSIS — R06.83 SNORING: ICD-10-CM

## 2022-05-24 DIAGNOSIS — G47.30 OBSERVED SLEEP APNEA: ICD-10-CM

## 2022-05-24 DIAGNOSIS — E66.01 MORBID OBESITY: ICD-10-CM

## 2022-05-24 DIAGNOSIS — G47.8 NON-RESTORATIVE SLEEP: ICD-10-CM

## 2022-05-24 DIAGNOSIS — G47.10 HYPERSOMNIA: ICD-10-CM

## 2022-05-24 PROCEDURE — 95806 SLEEP STUDY UNATT&RESP EFFT: CPT | Performed by: FAMILY MEDICINE

## 2022-05-24 PROCEDURE — 95806 SLEEP STUDY UNATT&RESP EFFT: CPT

## 2022-06-02 DIAGNOSIS — G47.33 SEVERE OBSTRUCTIVE SLEEP APNEA: Primary | ICD-10-CM

## 2022-06-02 DIAGNOSIS — G47.10 HYPERSOMNIA: ICD-10-CM

## 2022-06-02 DIAGNOSIS — G47.8 NON-RESTORATIVE SLEEP: ICD-10-CM

## 2022-06-02 DIAGNOSIS — R06.83 SNORING: ICD-10-CM

## 2022-06-02 DIAGNOSIS — E66.01 MORBID OBESITY: ICD-10-CM

## 2022-06-20 ENCOUNTER — TELEPHONE (OUTPATIENT)
Dept: FAMILY MEDICINE CLINIC | Facility: CLINIC | Age: 62
End: 2022-06-20

## 2022-06-20 NOTE — TELEPHONE ENCOUNTER
Caller: Eve Garrett    Relationship: Self    Best call back number:[phone]     What is the best time to reach you: ANY    Who are you requesting to speak with (clinical staff, provider,  specific staff member): CLINICAL    Do you know the name of the person who called:UNKNOWN  What was the call regarding: RETURNED CALL TO CLINIC    Do you require a callback: YES

## 2022-06-20 NOTE — TELEPHONE ENCOUNTER
Caller: Eve Garrett    Relationship: Self    Best call back number: [phone]    Requested Prescriptions:   Requested Prescriptions     Pending Prescriptions Disp Refills   • tiotropium (Spiriva HandiHaler) 18 MCG per inhalation capsule 90 capsule 1     Sig: Place 1 capsule into inhaler and inhale Daily.        [facility] be sent[facility]  [place] [facility] [address] [phone] [facility] [phone] FX     Additional details provided by patient: PATIENT REQUESTING TO BE SURE ITS 90 DAYS    Does the patient have less than a 3 day supply:  [x] Yes  [] No    Joy Hernandez Rep   06/20/22 10:35 EDT

## 2022-06-23 NOTE — TELEPHONE ENCOUNTER
Caller: Eve Garrett    Relationship: Self    Best call back number: [phone]    Requested Prescriptions:   Requested Prescriptions     Pending Prescriptions Disp Refills   • tiotropium (Spiriva HandiHaler) 18 MCG per inhalation capsule 90 capsule 1     Sig: Place 1 capsule into inhaler and inhale Daily.        Pharmacy where request should be sent: [facility] [place] [facility] [place] - [address] [phone] [facility] [phone] FX     Additional details provided by patient:  PATIENT IS OUT OF THIS MEDICATION. PHARMACY HAS NOT YET RECEIVED THE REQUEST SEND ON 6/20/22. PLEASE RESEND  Does the patient have less than a 3 day supply:  [x] Yes  [] No    Joy De León Rep   06/23/22 14:39 EDT

## 2022-08-19 DIAGNOSIS — J30.2 SEASONAL ALLERGIES: ICD-10-CM

## 2022-08-19 RX ORDER — OMEPRAZOLE 40 MG/1
40 CAPSULE, DELAYED RELEASE ORAL DAILY
Qty: 90 CAPSULE | Refills: 1 | Status: SHIPPED | OUTPATIENT
Start: 2022-08-19 | End: 2023-02-22 | Stop reason: SDUPTHER

## 2022-08-19 RX ORDER — ALBUTEROL SULFATE 90 UG/1
2 AEROSOL, METERED RESPIRATORY (INHALATION) EVERY 4 HOURS PRN
Qty: 3 G | Refills: 3 | Status: SHIPPED | OUTPATIENT
Start: 2022-08-19 | End: 2023-02-17 | Stop reason: HOSPADM

## 2022-08-19 RX ORDER — METOPROLOL TARTRATE 50 MG/1
50 TABLET, FILM COATED ORAL 2 TIMES DAILY
Qty: 180 TABLET | Refills: 3 | Status: SHIPPED | OUTPATIENT
Start: 2022-08-19 | End: 2023-02-22 | Stop reason: SDUPTHER

## 2022-08-19 RX ORDER — ATORVASTATIN CALCIUM 20 MG/1
40 TABLET, FILM COATED ORAL NIGHTLY
Qty: 90 TABLET | Refills: 3 | Status: SHIPPED | OUTPATIENT
Start: 2022-08-19 | End: 2023-02-22 | Stop reason: SDUPTHER

## 2022-08-19 RX ORDER — FLUTICASONE PROPIONATE 50 MCG
2 SPRAY, SUSPENSION (ML) NASAL DAILY
Qty: 3 G | Refills: 3 | Status: SHIPPED | OUTPATIENT
Start: 2022-08-19 | End: 2023-02-22 | Stop reason: SDUPTHER

## 2022-08-19 RX ORDER — LISINOPRIL 40 MG/1
40 TABLET ORAL DAILY
Qty: 90 TABLET | Refills: 3 | Status: SHIPPED | OUTPATIENT
Start: 2022-08-19 | End: 2023-02-22 | Stop reason: SDUPTHER

## 2022-08-24 NOTE — TELEPHONE ENCOUNTER
Caller: Eve Garrett    Relationship: Self    Best call back number: [phone]*    Requested Prescriptions:   Requested Prescriptions     Pending Prescriptions Disp Refills   • tiotropium (Spiriva HandiHaler) 18 MCG per inhalation capsule 90 capsule 1     Sig: Place 1 capsule into inhaler and inhale Daily.        Pharmacy where request should be sent:  PATIENT REQUESTING TO  A WRITTEN PRESCRIPTION TODAY. THE PATIENT STATES THE WRITTEN PRESCRIPTIONS THAT SHE PICKED UP DID NOT INCLUDE THIS MEDICATION.  PATIENT REQUEST A CALL WHEN WRITTEN PRESCRIPTION IS READY TO  TODAY. THE PATIENT IS GETTING READY TO GO OUT OF TOWN AND WILL NEED THIS MEDICATION SO SHE STATES THAT SHE NEEDS TO  TODAY.    Additional details provided by patient: PATIENT REQUEST TO BE WRITTEN FOR A 90-DAY REFILL.    Does the patient have less than a 3 day supply:  [x] Yes  [] No    Isaias Olmedo   08/24/22 11:48 EDT

## 2022-09-27 ENCOUNTER — TELEPHONE (OUTPATIENT)
Dept: FAMILY MEDICINE CLINIC | Facility: CLINIC | Age: 62
End: 2022-09-27

## 2022-09-27 NOTE — TELEPHONE ENCOUNTER
Caller: Eve Garrett    Relationship: Self    Best call back number: [phone]    What orders are you requesting (i.e. lab or imaging): REFERRAL     In what timeframe would the patient need to come in: AS SOON AS POSSIBLE     Where will you receive your lab/imaging services: [facility]    Additional notes: PATIENT WAS DIRECTED BY OFFICE STAFF TO GO TO URGENT CARE, SAYS SHE WILL NEED A REFERRAL FOR URGENT CARE BEFORE SHE CAN GO. PLEASE CALL ONCE COMPLETED

## 2022-09-27 NOTE — TELEPHONE ENCOUNTER
Approval received and patient notified. Patient states that she is not feeling well, possible sinus infection. Approval/auth # [id number]

## 2022-12-22 ENCOUNTER — TELEPHONE (OUTPATIENT)
Dept: FAMILY MEDICINE CLINIC | Facility: CLINIC | Age: 62
End: 2022-12-22

## 2022-12-22 NOTE — TELEPHONE ENCOUNTER
We use only cough drops, honey or Tessalon as the others don't seem to help cough .  We would be glad to get chest xray/ and/or see her tomorrow to listen to chest.  Any fever, shortness of breath,  Low oxygen with finger check or thick/collored sputum

## 2022-12-22 NOTE — TELEPHONE ENCOUNTER
Caller: Eve Garrett    Relationship: Self    Best call back number: [phone]    What medication are you requesting:COUGH MEDICATION     What are your current symptoms: COUGH     How long have you been experiencing symptoms: OVER 3 WEEKS     If a prescription is needed, what is your preferred pharmacy and phone number: [id number] - [place], [place] - [address] [facility] - [phone] [facility] [phone] FX     Additional notes: WENT TO ER OVER 3 WEEKS AGO FOR FLU AND THEY GAVE HER TESSALON PEARLS  FOR COUGH BUT ITS NOT WORKING ASKING FOR ANOTHER MEDICATION TO HELP WITH COUGH

## 2022-12-27 ENCOUNTER — OFFICE VISIT (OUTPATIENT)
Dept: FAMILY MEDICINE CLINIC | Facility: CLINIC | Age: 62
End: 2022-12-27

## 2022-12-27 ENCOUNTER — HOSPITAL ENCOUNTER (OUTPATIENT)
Dept: GENERAL RADIOLOGY | Facility: HOSPITAL | Age: 62
Discharge: HOME | End: 2022-12-27
Admitting: PREVENTIVE MEDICINE

## 2022-12-27 VITALS
OXYGEN SATURATION: 95 % | SYSTOLIC BLOOD PRESSURE: 173 MMHG | TEMPERATURE: 97.5 F | HEIGHT: 66 IN | HEART RATE: 87 BPM | WEIGHT: 253 LBS | BODY MASS INDEX: 40.66 KG/M2 | DIASTOLIC BLOOD PRESSURE: 101 MMHG

## 2022-12-27 DIAGNOSIS — I10 ESSENTIAL HYPERTENSION: ICD-10-CM

## 2022-12-27 DIAGNOSIS — J45.909 ASTHMA, UNSPECIFIED ASTHMA SEVERITY, UNSPECIFIED WHETHER COMPLICATED, UNSPECIFIED WHETHER PERSISTENT: ICD-10-CM

## 2022-12-27 DIAGNOSIS — J06.9 UPPER RESPIRATORY TRACT INFECTION, UNSPECIFIED TYPE: ICD-10-CM

## 2022-12-27 DIAGNOSIS — F32.A DEPRESSION, UNSPECIFIED DEPRESSION TYPE: ICD-10-CM

## 2022-12-27 DIAGNOSIS — Z00.01 ENCOUNTER FOR ANNUAL GENERAL MEDICAL EXAMINATION WITH ABNORMAL FINDINGS IN ADULT: Primary | ICD-10-CM

## 2022-12-27 PROCEDURE — 99396 PREV VISIT EST AGE 40-64: CPT | Performed by: PREVENTIVE MEDICINE

## 2022-12-27 PROCEDURE — 71046 X-RAY EXAM CHEST 2 VIEWS: CPT

## 2022-12-27 PROCEDURE — 99213 OFFICE O/P EST LOW 20 MIN: CPT | Performed by: PREVENTIVE MEDICINE

## 2022-12-27 RX ORDER — DOXYCYCLINE 100 MG/1
100 CAPSULE ORAL 2 TIMES DAILY
Qty: 20 CAPSULE | Refills: 0 | Status: SHIPPED | OUTPATIENT
Start: 2022-12-27 | End: 2022-12-27

## 2022-12-27 RX ORDER — FLUTICASONE FUROATE, UMECLIDINIUM BROMIDE AND VILANTEROL TRIFENATATE 100; 62.5; 25 UG/1; UG/1; UG/1
1 POWDER RESPIRATORY (INHALATION)
Qty: 3 EACH | Refills: 3 | Status: SHIPPED | OUTPATIENT
Start: 2022-12-27 | End: 2023-02-22 | Stop reason: SDUPTHER

## 2022-12-27 RX ORDER — CEFDINIR 300 MG/1
300 CAPSULE ORAL 2 TIMES DAILY
Qty: 20 CAPSULE | Refills: 0 | Status: SHIPPED | OUTPATIENT
Start: 2022-12-27 | End: 2023-01-17

## 2022-12-27 NOTE — PROGRESS NOTES
"Subjective   Eve Garrett is a 62 y.o. female presents for   Chief Complaint   Patient presents with   • Cough   • Sore Throat     Patient presents today for her annual wellness exam and states that she has had a cough for the last 3 weeks and had been diagnosed at [facility][1] with flu A.  She had some fever initially and has had a sore throat pretty much all the way through this is coughing up red-black green and creamy sputum no chest x-ray was ever ordered we have ordered 1 today.  Cefdinir 300 mg twice daily till gone and have sent Trelegy inhaler to replace Spiriva to her pharmacy in [place].  Patient's depression has been under good control no HI or SI she states that her blood pressure is usually under good control but after paroxysms of cough it was elevated in the office today.  Health Maintenance Due   Topic Date Due   • COVID-19 Vaccine (4 - Booster for Pfizer series) 03/01/2022   • ANNUAL PHYSICAL  06/15/2022   • INFLUENZA VACCINE  08/01/2022   • Pneumococcal Vaccine 0-64 (2 - PCV) 09/21/2022       History of Present Illness     Vitals:    12/27/22 1000 12/27/22 1001   BP: 175/85 (!) 173/101   BP Location: Left arm Right arm   Patient Position: Sitting Sitting   Cuff Size: Adult Adult   Pulse: 87    Temp: 97.5 °F (36.4 °C)    SpO2: 95%    Weight: 115 kg (253 lb)    Height: 168.9 cm (66.5\")      Body mass index is 40.23 kg/m².    Current Outpatient Medications on File Prior to Visit   Medication Sig Dispense Refill   • atorvastatin (LIPITOR) 20 MG tablet Take 2 tablets by mouth Every Night. 90 tablet 3   • lisinopril (PRINIVIL,ZESTRIL) 40 MG tablet Take 1 tablet by mouth Daily. 90 tablet 3   • metoprolol tartrate (LOPRESSOR) 50 MG tablet Take 1 tablet by mouth 2 (Two) Times a Day. 180 tablet 3   • multivitamin (THERAGRAN) tablet tablet Take 1 tablet by mouth Daily.     • octreotide (sandoSTATIN) 200 MCG/ML injection 200 mcg Daily.     • omeprazole (priLOSEC) 40 MG capsule Take 1 capsule by " "mouth Daily. 90 capsule 1   • albuterol sulfate  (90 Base) MCG/ACT inhaler Inhale 2 puffs Every 4 (Four) Hours As Needed for Wheezing. 3 g 3   • B-D 3CC LUER-NAYELI SYR 25GX5/8\" 25G X 5/8\" 3 ML misc      • dicyclomine (BENTYL) 20 MG tablet Take 20 mg by mouth Every 6 (Six) Hours.     • fluticasone (FLONASE) 50 MCG/ACT nasal spray 2 sprays into the nostril(s) as directed by provider Daily. 3 g 3   • icosapent ethyl (Vascepa) 1 g capsule capsule Take 2 g by mouth 2 (Two) Times a Day With Meals. 360 capsule 3   • montelukast (SINGULAIR) 10 MG tablet Take 1 tablet by mouth Every Night for 14 days. 14 tablet 0   • tiotropium (Spiriva HandiHaler) 18 MCG per inhalation capsule Place 1 capsule into inhaler and inhale Daily. 90 capsule 1     No current facility-administered medications on file prior to visit.       The following portions of the patient's history were reviewed and updated as appropriate: allergies, current medications, past family history, past medical history, past social history, past surgical history and problem list.    Review of Systems   HENT: Positive for congestion and sore throat.    Respiratory: Positive for cough.    Gastrointestinal: Positive for diarrhea.   Psychiatric/Behavioral: Positive for depressed mood.       Objective   Physical Exam  Vitals reviewed.   Constitutional:       General: She is not in acute distress.     Appearance: She is well-developed. She is obese. She is not ill-appearing or toxic-appearing.   HENT:      Head: Normocephalic and atraumatic.      Right Ear: Tympanic membrane, ear canal and external ear normal.      Left Ear: Tympanic membrane, ear canal and external ear normal.      Nose: Congestion present.      Mouth/Throat:      Mouth: Mucous membranes are moist.      Pharynx: Posterior oropharyngeal erythema present.   Eyes:      Extraocular Movements: Extraocular movements intact.      Conjunctiva/sclera: Conjunctivae normal.      Pupils: Pupils are equal, round, " and reactive to light.   Cardiovascular:      Rate and Rhythm: Normal rate and regular rhythm.      Heart sounds: Normal heart sounds.   Pulmonary:      Effort: Pulmonary effort is normal.      Comments: Slight decreased breath sounds bilaterally no wheezes rales or rhonchi  Abdominal:      General: Bowel sounds are normal. There is no distension.      Palpations: Abdomen is soft. There is no mass.      Tenderness: There is abdominal tenderness.      Comments: Usual right lower quadrant tenderness due to appendiceal issues.   Musculoskeletal:      Cervical back: Neck supple.   Skin:     General: Skin is warm.   Neurological:      General: No focal deficit present.      Mental Status: She is alert and oriented to person, place, and time.   Psychiatric:         Mood and Affect: Mood normal.         Behavior: Behavior normal.       PHQ-9 Total Score:      Assessment & Plan   Diagnoses and all orders for this visit:    1. Encounter for annual general medical examination with abnormal findings in adult (Primary)  Comments:  Patient has been advised to wear sunscreen and a seatbelt    2. Upper respiratory tract infection, unspecified type  Comments:  Diagnosed with flu a 3 weeks ago at [facility] has had cough ever since throat is still sore but no recent fever.  No diarrhea or nausea today.  Orders:  -     XR Chest PA & Lateral    3. Asthma, unspecified asthma severity, unspecified whether complicated, unspecified whether persistent  Comments:  Asthma has been under fairly good control some paroxysmal coughing with most recent flu    4. Depression, unspecified depression type  Comments:  Controlled no HI or SI.    5. Essential hypertension  Comments:  Blood pressure has been elevated due to cough patient will continue to home monitor.    Other orders  -     Discontinue: doxycycline (MONODOX) 100 MG capsule; Take 1 capsule by mouth 2 (Two) Times a Day.  Dispense: 20 capsule; Refill: 0  -     cefdinir  (OMNICEF) 300 MG capsule; Take 1 capsule by mouth 2 (Two) Times a Day.  Dispense: 20 capsule; Refill: 0  -     Fluticasone-Umeclidin-Vilant (Trelegy Ellipta) 100-62.5-25 MCG/ACT inhaler; Inhale 1 puff Daily.  Dispense: 3 each; Refill: 3        Patient Instructions     Health Maintenance Due   Topic Date Due   • COVID-19 Vaccine (4 - Booster for Pfizer series) 03/01/2022   • ANNUAL PHYSICAL  06/15/2022   • INFLUENZA VACCINE  08/01/2022   • Pneumococcal Vaccine 0-64 (2 - PCV) 09/21/2022    Rest and fluids.  Send [employer] Message Friday to report progress.  Stop Spiriva once gets Trelegy.    Continue salt water gargles 1/4 tsp salt 4 ounces water

## 2022-12-27 NOTE — PATIENT INSTRUCTIONS
Health Maintenance Due   Topic Date Due    COVID-19 Vaccine (4 - Booster for Pfizer series) 03/01/2022    ANNUAL PHYSICAL  06/15/2022    INFLUENZA VACCINE  08/01/2022    Pneumococcal Vaccine 0-64 (2 - PCV) 09/21/2022    Rest and fluids.  Send [employer] Message Friday to report progress.  Stop Spiriva once gets Trelegy.    Continue salt water gargles 1/4 tsp salt 4 ounces water

## 2023-01-05 DIAGNOSIS — I51.7 CARDIOMEGALY: Primary | ICD-10-CM

## 2023-01-14 NOTE — TELEPHONE ENCOUNTER
Patient needing 90 day supply printed out to take with her to [facility]    Call when ready      [phone]   24

## 2023-01-17 ENCOUNTER — OFFICE VISIT (OUTPATIENT)
Dept: FAMILY MEDICINE CLINIC | Facility: CLINIC | Age: 63
End: 2023-01-17
Payer: OTHER GOVERNMENT

## 2023-01-17 VITALS
DIASTOLIC BLOOD PRESSURE: 95 MMHG | SYSTOLIC BLOOD PRESSURE: 134 MMHG | OXYGEN SATURATION: 97 % | WEIGHT: 254.2 LBS | TEMPERATURE: 96.6 F | HEART RATE: 80 BPM | BODY MASS INDEX: 40.85 KG/M2 | HEIGHT: 66 IN

## 2023-01-17 DIAGNOSIS — K21.9 GASTROESOPHAGEAL REFLUX DISEASE WITHOUT ESOPHAGITIS: ICD-10-CM

## 2023-01-17 DIAGNOSIS — F32.A DEPRESSION, UNSPECIFIED DEPRESSION TYPE: ICD-10-CM

## 2023-01-17 DIAGNOSIS — D37.3 LOW GRADE MUCINOUS NEOPLASM OF APPENDIX: ICD-10-CM

## 2023-01-17 DIAGNOSIS — G47.30 OBSERVED SLEEP APNEA: ICD-10-CM

## 2023-01-17 DIAGNOSIS — R05.2 SUBACUTE COUGH: ICD-10-CM

## 2023-01-17 DIAGNOSIS — E55.9 VITAMIN D DEFICIENCY: ICD-10-CM

## 2023-01-17 DIAGNOSIS — E66.01 CLASS 3 SEVERE OBESITY DUE TO EXCESS CALORIES WITHOUT SERIOUS COMORBIDITY WITH BODY MASS INDEX (BMI) OF 40.0 TO 44.9 IN ADULT: ICD-10-CM

## 2023-01-17 DIAGNOSIS — I10 ESSENTIAL HYPERTENSION: ICD-10-CM

## 2023-01-17 DIAGNOSIS — K59.00 CONSTIPATION, UNSPECIFIED CONSTIPATION TYPE: ICD-10-CM

## 2023-01-17 DIAGNOSIS — E78.00 PURE HYPERCHOLESTEROLEMIA: ICD-10-CM

## 2023-01-17 DIAGNOSIS — E07.9 DISEASE OF THYROID GLAND: ICD-10-CM

## 2023-01-17 DIAGNOSIS — R10.9 ABDOMINAL PAIN, UNSPECIFIED ABDOMINAL LOCATION: ICD-10-CM

## 2023-01-17 DIAGNOSIS — R55 SYNCOPE AND COLLAPSE: ICD-10-CM

## 2023-01-17 DIAGNOSIS — R07.0 THROAT PAIN: ICD-10-CM

## 2023-01-17 DIAGNOSIS — Z00.01 ENCOUNTER FOR ANNUAL GENERAL MEDICAL EXAMINATION WITH ABNORMAL FINDINGS IN ADULT: Primary | ICD-10-CM

## 2023-01-17 DIAGNOSIS — J45.909 ASTHMA, UNSPECIFIED ASTHMA SEVERITY, UNSPECIFIED WHETHER COMPLICATED, UNSPECIFIED WHETHER PERSISTENT: ICD-10-CM

## 2023-01-17 LAB
ALBUMIN SERPL-MCNC: 4.1 G/DL (ref 3.5–5.2)
ALBUMIN/GLOB SERPL: 1.1 G/DL
ALP SERPL-CCNC: 106 U/L (ref 39–117)
ALT SERPL W P-5'-P-CCNC: 25 U/L (ref 1–33)
ANION GAP SERPL CALCULATED.3IONS-SCNC: 7 MMOL/L (ref 5–15)
AST SERPL-CCNC: 23 U/L (ref 1–32)
BASOPHILS # BLD AUTO: 0.08 10*3/MM3 (ref 0–0.2)
BASOPHILS NFR BLD AUTO: 1.2 % (ref 0–1.5)
BILIRUB SERPL-MCNC: 0.2 MG/DL (ref 0–1.2)
BUN SERPL-MCNC: 10 MG/DL (ref 8–23)
BUN/CREAT SERPL: 13.9 (ref 7–25)
CALCIUM SPEC-SCNC: 9.8 MG/DL (ref 8.6–10.5)
CHLORIDE SERPL-SCNC: 106 MMOL/L (ref 98–107)
CHOLEST SERPL-MCNC: 147 MG/DL (ref 0–200)
CO2 SERPL-SCNC: 25 MMOL/L (ref 22–29)
CREAT SERPL-MCNC: 0.72 MG/DL (ref 0.57–1)
DEPRECATED RDW RBC AUTO: 46.2 FL (ref 37–54)
EGFRCR SERPLBLD CKD-EPI 2021: 95.3 ML/MIN/1.73
EOSINOPHIL # BLD AUTO: 0.33 10*3/MM3 (ref 0–0.4)
EOSINOPHIL NFR BLD AUTO: 4.9 % (ref 0.3–6.2)
ERYTHROCYTE [DISTWIDTH] IN BLOOD BY AUTOMATED COUNT: 16.4 % (ref 12.3–15.4)
GLOBULIN UR ELPH-MCNC: 3.7 GM/DL
GLUCOSE SERPL-MCNC: 135 MG/DL (ref 65–99)
HCT VFR BLD AUTO: 38.5 % (ref 34–46.6)
HDLC SERPL-MCNC: 39 MG/DL (ref 40–60)
HGB BLD-MCNC: 12.1 G/DL (ref 12–15.9)
IMM GRANULOCYTES # BLD AUTO: 0 10*3/MM3 (ref 0–0.05)
IMM GRANULOCYTES NFR BLD AUTO: 0 % (ref 0–0.5)
LDLC SERPL CALC-MCNC: 79 MG/DL (ref 0–100)
LDLC/HDLC SERPL: 1.91 {RATIO}
LYMPHOCYTES # BLD AUTO: 2.1 10*3/MM3 (ref 0.7–3.1)
LYMPHOCYTES NFR BLD AUTO: 31.1 % (ref 19.6–45.3)
MAGNESIUM SERPL-MCNC: 3.4 MG/DL (ref 1.6–2.4)
MCH RBC QN AUTO: 24.5 PG (ref 26.6–33)
MCHC RBC AUTO-ENTMCNC: 31.4 G/DL (ref 31.5–35.7)
MCV RBC AUTO: 78.1 FL (ref 79–97)
MONOCYTES # BLD AUTO: 0.71 10*3/MM3 (ref 0.1–0.9)
MONOCYTES NFR BLD AUTO: 10.5 % (ref 5–12)
NEUTROPHILS NFR BLD AUTO: 3.53 10*3/MM3 (ref 1.7–7)
NEUTROPHILS NFR BLD AUTO: 52.3 % (ref 42.7–76)
NRBC BLD AUTO-RTO: 0.1 /100 WBC (ref 0–0.2)
PLATELET # BLD AUTO: 319 10*3/MM3 (ref 140–450)
PMV BLD AUTO: 10.3 FL (ref 6–12)
POTASSIUM SERPL-SCNC: 4.3 MMOL/L (ref 3.5–5.2)
PROT SERPL-MCNC: 7.8 G/DL (ref 6–8.5)
RBC # BLD AUTO: 4.93 10*6/MM3 (ref 3.77–5.28)
SODIUM SERPL-SCNC: 138 MMOL/L (ref 136–145)
TRIGL SERPL-MCNC: 167 MG/DL (ref 0–150)
TSH SERPL DL<=0.05 MIU/L-ACNC: 3.77 UIU/ML (ref 0.27–4.2)
VIT B12 BLD-MCNC: 535 PG/ML (ref 211–946)
VLDLC SERPL-MCNC: 29 MG/DL (ref 5–40)
WBC NRBC COR # BLD: 6.75 10*3/MM3 (ref 3.4–10.8)

## 2023-01-17 PROCEDURE — 99214 OFFICE O/P EST MOD 30 MIN: CPT | Performed by: PREVENTIVE MEDICINE

## 2023-01-17 PROCEDURE — 83735 ASSAY OF MAGNESIUM: CPT | Performed by: PREVENTIVE MEDICINE

## 2023-01-17 PROCEDURE — 80053 COMPREHEN METABOLIC PANEL: CPT | Performed by: PREVENTIVE MEDICINE

## 2023-01-17 PROCEDURE — 99396 PREV VISIT EST AGE 40-64: CPT | Performed by: PREVENTIVE MEDICINE

## 2023-01-17 PROCEDURE — 85025 COMPLETE CBC W/AUTO DIFF WBC: CPT | Performed by: PREVENTIVE MEDICINE

## 2023-01-17 PROCEDURE — 82607 VITAMIN B-12: CPT | Performed by: PREVENTIVE MEDICINE

## 2023-01-17 PROCEDURE — 36415 COLL VENOUS BLD VENIPUNCTURE: CPT | Performed by: PREVENTIVE MEDICINE

## 2023-01-17 PROCEDURE — 80061 LIPID PANEL: CPT | Performed by: PREVENTIVE MEDICINE

## 2023-01-17 PROCEDURE — 84443 ASSAY THYROID STIM HORMONE: CPT | Performed by: PREVENTIVE MEDICINE

## 2023-01-17 RX ORDER — AMOXICILLIN 875 MG/1
875 TABLET, COATED ORAL 2 TIMES DAILY
Qty: 20 TABLET | Refills: 0 | Status: SHIPPED | OUTPATIENT
Start: 2023-01-17 | End: 2023-02-09

## 2023-01-17 NOTE — PROGRESS NOTES
"Subjective   Eve Garrett is a 62 y.o. female presents for   Chief Complaint   Patient presents with   • Sore Throat     Right side      Patient presents today for her annual wellness exam and has been advised to wear sunscreen and seatbelt.  She is complaining of right-sided throat and neck pain that is been going on since she has had cough for the last month.  She is still coughing up grayish sputum in the morning nightly sputum at night no fever x-ray was negative.  Throat looks normal today negative cricothyroid maneuver no adenopathy some slight erythema to the right TM will try some amoxicillin 875 twice a day and if this fails to make improvement we will refer her to the ear nose and throat doctor.  Also will get a chest CT if cough persists.  Finally she is going to follow-up with Dr. Amador in regards to her low-grade mucinous neoplasm of the appendix blood pressures not well controlled she will home monitor and send us the results.  Depression seems to be under good control and her reflux is controlled with omeprazole but we will consider switching her to famotidine once we get her throat figured out.  Health Maintenance Due   Topic Date Due   • COVID-19 Vaccine (5 - Booster for Pfizer series) 03/01/2022   • ANNUAL PHYSICAL  06/15/2022   • Pneumococcal Vaccine 0-64 (2 - PCV) 09/21/2022       History of Present Illness     Vitals:    01/17/23 0813 01/17/23 0817   BP: 161/88 134/95   BP Location: Right arm Left arm   Patient Position: Sitting Sitting   Cuff Size: Large Adult Large Adult   Pulse: 80    Temp: 96.6 °F (35.9 °C)    TempSrc: Tympanic    SpO2: 97%    Weight: 115 kg (254 lb 3.2 oz)    Height: 168.9 cm (66.5\")      Body mass index is 40.42 kg/m².    Current Outpatient Medications on File Prior to Visit   Medication Sig Dispense Refill   • albuterol sulfate  (90 Base) MCG/ACT inhaler Inhale 2 puffs Every 4 (Four) Hours As Needed for Wheezing. 3 g 3   • atorvastatin (LIPITOR) 20 MG tablet " "Take 2 tablets by mouth Every Night. 90 tablet 3   • B-D 3CC LUER-NAYELI SYR 25GX5/8\" 25G X 5/8\" 3 ML misc      • dicyclomine (BENTYL) 20 MG tablet Take 20 mg by mouth Every 6 (Six) Hours.     • fluticasone (FLONASE) 50 MCG/ACT nasal spray 2 sprays into the nostril(s) as directed by provider Daily. 3 g 3   • Fluticasone-Umeclidin-Vilant (Trelegy Ellipta) 100-62.5-25 MCG/ACT inhaler Inhale 1 puff Daily. 3 each 3   • icosapent ethyl (Vascepa) 1 g capsule capsule Take 2 g by mouth 2 (Two) Times a Day With Meals. 360 capsule 3   • lisinopril (PRINIVIL,ZESTRIL) 40 MG tablet Take 1 tablet by mouth Daily. 90 tablet 3   • metoprolol tartrate (LOPRESSOR) 50 MG tablet Take 1 tablet by mouth 2 (Two) Times a Day. 180 tablet 3   • multivitamin (THERAGRAN) tablet tablet Take 1 tablet by mouth Daily.     • octreotide (sandoSTATIN) 200 MCG/ML injection 200 mcg Daily.     • omeprazole (priLOSEC) 40 MG capsule Take 1 capsule by mouth Daily. 90 capsule 1   • [DISCONTINUED] cefdinir (OMNICEF) 300 MG capsule Take 1 capsule by mouth 2 (Two) Times a Day. 20 capsule 0   • montelukast (SINGULAIR) 10 MG tablet Take 1 tablet by mouth Every Night for 14 days. 14 tablet 0   • tiotropium (Spiriva HandiHaler) 18 MCG per inhalation capsule Place 1 capsule into inhaler and inhale Daily. 90 capsule 1     No current facility-administered medications on file prior to visit.       The following portions of the patient's history were reviewed and updated as appropriate: allergies, current medications, past family history, past medical history, past social history, past surgical history and problem list.    Review of Systems   HENT: Positive for congestion and sore throat. Negative for swollen glands.    Respiratory: Positive for cough.    Gastrointestinal: Positive for abdominal pain. Negative for GERD.   Psychiatric/Behavioral: Positive for depressed mood.       Objective   Physical Exam  Vitals reviewed.   Constitutional:       General: She is not in " acute distress.     Appearance: She is well-developed. She is obese. She is not ill-appearing or toxic-appearing.   HENT:      Head: Normocephalic and atraumatic.      Right Ear: Ear canal and external ear normal.      Left Ear: Tympanic membrane, ear canal and external ear normal.      Ears:      Comments: Mild erythema decreased really light reflex on the right     Nose: Nose normal.      Mouth/Throat:      Mouth: Mucous membranes are moist.      Pharynx: No posterior oropharyngeal erythema.   Eyes:      Extraocular Movements: Extraocular movements intact.      Conjunctiva/sclera: Conjunctivae normal.      Pupils: Pupils are equal, round, and reactive to light.   Cardiovascular:      Rate and Rhythm: Normal rate and regular rhythm.      Heart sounds: Normal heart sounds.   Pulmonary:      Effort: Pulmonary effort is normal.      Breath sounds: Normal breath sounds.   Abdominal:      General: Bowel sounds are normal. There is no distension.      Palpations: Abdomen is soft. There is no mass.      Tenderness: There is no abdominal tenderness.   Musculoskeletal:         General: Normal range of motion.      Cervical back: Neck supple.   Skin:     General: Skin is warm.   Neurological:      General: No focal deficit present.      Mental Status: She is alert and oriented to person, place, and time.   Psychiatric:         Mood and Affect: Mood normal.         Behavior: Behavior normal.       PHQ-9 Total Score: 0    Assessment & Plan   Diagnoses and all orders for this visit:    1. Encounter for annual general medical examination with abnormal findings in adult (Primary)  Comments:  Advise sun screen and seat belt    2. Abdominal pain, unspecified abdominal location  Comments:  Infrequently    3. Asthma, unspecified asthma severity, unspecified whether complicated, unspecified whether persistent  Comments:  Controlled    4. Constipation, unspecified constipation type  Comments:  Problems recently-will switch to  Miralax    5. Depression, unspecified depression type  Comments:  Controlled  Orders:  -     Magnesium; Future  -     TSH; Future  -     Vitamin B12; Future  -     Magnesium  -     TSH  -     Vitamin B12    6. Gastroesophageal reflux disease without esophagitis  Comments:  Controlled-will consider switch to Famotidine  Orders:  -     CBC Auto Differential; Future  -     Magnesium; Future  -     CBC Auto Differential  -     Magnesium    7. Essential hypertension  Comments:  Elevated will home monitor  Orders:  -     Comprehensive Metabolic Panel; Future  -     Comprehensive Metabolic Panel    8. Pure hypercholesterolemia  Comments:  Trying to watch sat fats  Orders:  -     Lipid Panel; Future  -     Lipid Panel    9. Observed sleep apnea  Comments:  Can't handle mask    10. Disease of thyroid gland  Comments:  Increase in dry skin but no increase in hairloss  Orders:  -     TSH; Future  -     TSH    11. Syncope and collapse  Comments:  No more syncopes    12. 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 Lamb  Orders:  -     Ambulatory Referral to Hematology / Oncology    14. Throat pain  Comments:  Right ear pain throat pain right side.  Amox and if no improvement ENT    15. Subacute cough  Comments:  Gray sputum    16. Class 3 severe obesity due to excess calories without serious comorbidity with body mass index (BMI) of 40.0 to 44.9 in adult (HCC)    Other orders  -     amoxicillin (AMOXIL) 875 MG tablet; Take 1 tablet by mouth 2 (Two) Times a Day.  Dispense: 20 tablet; Refill: 0        Patient Instructions     Health Maintenance Due   Topic Date Due   • COVID-19 Vaccine (5 - Booster for Pfizer series) 03/01/2022   • ANNUAL PHYSICAL  06/15/2022   • Pneumococcal Vaccine 0-64 (2 - PCV) 09/21/2022    Check blood pressure cuff for accuracy and send 10 blood pressures over 2 weeks.  Watch sodium, alcohol and weight     Call 2-3 weeks if  throat and cough not improved.

## 2023-01-17 NOTE — PATIENT INSTRUCTIONS
Health Maintenance Due   Topic Date Due    COVID-19 Vaccine (5 - Booster for Pfizer series) 03/01/2022    ANNUAL PHYSICAL  06/15/2022    Pneumococcal Vaccine 0-64 (2 - PCV) 09/21/2022    Check blood pressure cuff for accuracy and send 10 blood pressures over 2 weeks.  Watch sodium, alcohol and weight     Call 2-3 weeks if throat and cough not improved.

## 2023-01-17 NOTE — PROGRESS NOTES
Venipuncture Blood Specimen Collection  Venipuncture performed in left arm by Torri Byers MA with good hemostasis. Patient tolerated the procedure well without complications.   01/17/23   Torri Byers MA

## 2023-01-18 ENCOUNTER — TELEPHONE (OUTPATIENT)
Dept: FAMILY MEDICINE CLINIC | Facility: CLINIC | Age: 63
End: 2023-01-18
Payer: OTHER GOVERNMENT

## 2023-01-18 DIAGNOSIS — R73.9 HYPERGLYCEMIA: Primary | ICD-10-CM

## 2023-01-18 DIAGNOSIS — E83.41 HYPERMAGNESEMIA: ICD-10-CM

## 2023-01-18 NOTE — TELEPHONE ENCOUNTER
HUB TO READ:  ----- Message from Nunu Kingston MD sent at 1/18/2023 11:25 AM EST -----  Glucose was 135 so if this was truly a fasting lab she needs to be rechecked again in 3 to 6 months to make sure that she is not developing diabetes.  Orders have been placed for her to come into our office with a 12-hour fast in 3 to 6 months.  In the meantime she should watch her carbohydrates and increase her walking.  Magnesium was elevated so she is taking any additional over-the-counter magnesium she should stop.  If she is not taking magnesium we should check her magnesium again in a couple of weeks can be done nonfasting at our office to see if we will need to change her blood pressure medicine call if any other questions or concerns.

## 2023-01-18 NOTE — PROGRESS NOTES
Glucose was 135 so if this was truly a fasting lab she needs to be rechecked again in 3 to 6 months to make sure that she is not developing diabetes.  Orders have been placed for her to come into our office with a 12-hour fast in 3 to 6 months.  In the meantime she should watch her carbohydrates and increase her walking.  Magnesium was elevated so she is taking any additional over-the-counter magnesium she should stop.  If she is not taking magnesium we should check her magnesium again in a couple of weeks can be done nonfasting at our office to see if we will need to change her blood pressure medicine call if any other questions or concerns.

## 2023-01-19 ENCOUNTER — PRIOR AUTHORIZATION (OUTPATIENT)
Dept: FAMILY MEDICINE CLINIC | Facility: CLINIC | Age: 63
End: 2023-01-19
Payer: OTHER GOVERNMENT

## 2023-01-19 NOTE — TELEPHONE ENCOUNTER
Adonis approval obtained on [facility] . Auth number [id number] valid 1-13-23 to 01-13-24

## 2023-01-24 ENCOUNTER — OFFICE VISIT (OUTPATIENT)
Dept: CARDIOLOGY | Facility: CLINIC | Age: 63
End: 2023-01-24
Payer: OTHER GOVERNMENT

## 2023-01-24 VITALS
HEART RATE: 70 BPM | OXYGEN SATURATION: 98 % | HEIGHT: 66 IN | DIASTOLIC BLOOD PRESSURE: 84 MMHG | SYSTOLIC BLOOD PRESSURE: 156 MMHG | BODY MASS INDEX: 41.71 KG/M2 | WEIGHT: 259.5 LBS

## 2023-01-24 DIAGNOSIS — R06.02 SHORTNESS OF BREATH: Primary | ICD-10-CM

## 2023-01-24 DIAGNOSIS — G47.33 OBSTRUCTIVE SLEEP APNEA: ICD-10-CM

## 2023-01-24 DIAGNOSIS — E78.00 PURE HYPERCHOLESTEROLEMIA: ICD-10-CM

## 2023-01-24 DIAGNOSIS — I10 PRIMARY HYPERTENSION: ICD-10-CM

## 2023-01-24 PROCEDURE — 99214 OFFICE O/P EST MOD 30 MIN: CPT | Performed by: INTERNAL MEDICINE

## 2023-01-24 NOTE — PROGRESS NOTES
Subjective:     Encounter Date:01/24/2023      Patient ID: Eve Garrett is a 62 y.o. female.    Chief Complaint:  History of Present Illness 62-year-old white female with history of hypertension hyperlipidemia sleep apnea presents to my office for follow-up.  Patient is currently having increasing symptoms of shortness of breath.  No complains any chest pain.  No PND orthopnea.  No palpitation dizziness syncope she has some swelling of the feet.  She has sleep apnea but does not use a CPAP machine.  She does not smoke.  She is taking her medicines regularly.    The following portions of the patient's history were reviewed and updated as appropriate: allergies, current medications, past family history, past medical history, past social history, past surgical history and problem list.  Past Medical History:   Diagnosis Date   • Allergic rhinitis    • Anemia 04/2020   • Asthma 11/04/2011   • Calcaneal spur of foot, right 02/20/2021    XRAY AT [facility]   • Cancer (HCC)     Appendcele mucous neoplasm   • Constipation 07/11/2012   • Contact dermatitis 07/16/2015   • Depression 05/28/2013   • Disease of thyroid gland     HYPOTHYROIDISM   • Diverticulitis 02/01/2012   • Dyspnea 07/13/2020    SEEN AT [facility] ER   • Gastroesophageal reflux disease 05/28/2013   • Hyperlipidemia    • Hypertension    • MRSA infection 04/25/2020   • Myalgia and myositis 09/13/2011   • Osteoarthritis 10/30/2014   • Ovarian cyst 04/23/2013   • Ovarian enlargement, left    • Plantar fasciitis, right 02/20/2021    SEEN AT [facility] ER   • Seasonal allergies    • Sleep apnea 05/21/2021    NO CPAP     Past Surgical History:   Procedure Laterality Date   • APPENDECTOMY N/A 03/20/2020    LAPAROSCOPIC, DR. WILLIAM CHEADLE AT [place]   • CARDIAC CATHETERIZATION Left 10/02/2009    No Intervention   • CHOLECYSTECTOMY N/A 02/08/2005    DR. CHRIS LOZA AT [facility]   • COLON RESECTION LEFT Left 2010    D/T RUPTURED  "DIVERTICULA, DR. EDA CROWE   • COLON RESECTION SMALL BOWEL N/A 04/07/2020    LAPAROSCOPY, MOBILIZATION OF RIGHT COLON, CONVERSION TO OPEN WITH RIGHT TRANSVERSE INCISION, EXTENSIVE LYSIS OF ADHESIONS, AND ILEOCECTOMY, DR. WILLIAM CHEADLE AT [place]   • COLONOSCOPY N/A 03/2012    WITH ILEOSCOPY   • COLONOSCOPY N/A 11/09/2020    A FEW DIVERTICULA IN DESCENDING AND SIGMOID, MILD INTERNAL HEMORRHOIDS, RLQ ANASTAMOSIS, BX: REACTIVE ILEAL MUCOSA WITH MILD CHRONIC ILEITIS, RESCOPE IN 3 YRS, DR. AUSTEN NÚÑEZ AT [facility]   • COLONOSCOPY W/ POLYPECTOMY N/A 02/24/2017    BULBUOUS APPENDIX, POLYPS   • DIAGNOSTIC LAPAROSCOPY N/A 1993    FOR ENDOMETRIOSIS   • DIAGNOSTIC LAPAROSCOPY N/A 1992    FOR ENDOMETRIOSIS   • ENDOSCOPY AND COLONOSCOPY N/A 2009   • ERCP WITH SPHINCTEROTOMY/PAPILLOTOMY N/A 2005    WITH STENT   • FOOT SURGERY Right 2003   • HEMICOLECTOMY Left 06/13/2012    LAPAROSCOPIC LEFT AND SIGMOID HEMICOLECTOMY WITH TRANSVERSE RECTAL ANASTAMOSIS AND LAPAROSCOPIC TAKEDOWN OF SPLENIC FLEXURE, D/T DIVERTICULAR PERFORATION WITH ABSCESS, DR. ABY MCFARLAND AT [facility]   • HYSTERECTOMY N/A 1992    ROZINA WITH BSO     /84 Comment: recheck  Pulse 70   Ht 168.9 cm (66.5\")   Wt 118 kg (259 lb 8 oz)   SpO2 98%   BMI 41.26 kg/m²   Family History   Problem Relation Age of Onset   • Cancer Mother    • Liver cancer Mother    • Basal cell carcinoma Mother    • Diabetes Mother    • Cancer Father    • Lung cancer Father    • Hypertension Father         And also my mother had as well as my self   • Celiac disease Father    • Heart disease Father    • Diverticulitis Sister    • Hypertension Sister    • Cancer Brother         BLADDER CANCER   • Cancer Brother    • Stomach cancer Brother    • Cancer Maternal Aunt    • Ovarian cancer Maternal Aunt    • Cancer Paternal Aunt    • Breast cancer Paternal Aunt    • Cancer Paternal Uncle    • Stomach cancer Paternal Uncle    • Heart disease Maternal Grandmother    • Heart disease " Paternal Grandmother    • Heart failure Paternal Grandmother        Current Outpatient Medications:   •  albuterol sulfate  (90 Base) MCG/ACT inhaler, Inhale 2 puffs Every 4 (Four) Hours As Needed for Wheezing., Disp: 3 g, Rfl: 3  •  amoxicillin (AMOXIL) 875 MG tablet, Take 1 tablet by mouth 2 (Two) Times a Day., Disp: 20 tablet, Rfl: 0  •  atorvastatin (LIPITOR) 20 MG tablet, Take 2 tablets by mouth Every Night., Disp: 90 tablet, Rfl: 3  •  fluticasone (FLONASE) 50 MCG/ACT nasal spray, 2 sprays into the nostril(s) as directed by provider Daily., Disp: 3 g, Rfl: 3  •  Fluticasone-Umeclidin-Vilant (Trelegy Ellipta) 100-62.5-25 MCG/ACT inhaler, Inhale 1 puff Daily., Disp: 3 each, Rfl: 3  •  lisinopril (PRINIVIL,ZESTRIL) 40 MG tablet, Take 1 tablet by mouth Daily., Disp: 90 tablet, Rfl: 3  •  metoprolol tartrate (LOPRESSOR) 50 MG tablet, Take 1 tablet by mouth 2 (Two) Times a Day., Disp: 180 tablet, Rfl: 3  •  multivitamin (THERAGRAN) tablet tablet, Take 1 tablet by mouth Daily., Disp: , Rfl:   •  omeprazole (priLOSEC) 40 MG capsule, Take 1 capsule by mouth Daily., Disp: 90 capsule, Rfl: 1  •  montelukast (SINGULAIR) 10 MG tablet, Take 1 tablet by mouth Every Night for 14 days., Disp: 14 tablet, Rfl: 0  Allergies   Allergen Reactions   • Promethazine Anxiety and Hallucinations     AKA PHENERGAN, Delusions, hallucinations     Social History     Socioeconomic History   • Marital status:    Tobacco Use   • Smoking status: Never   • Smokeless tobacco: Never   • Tobacco comments:     My father was a heavy smoker, and my brothers all smoke   Vaping Use   • Vaping Use: Never used   Substance and Sexual Activity   • Alcohol use: Yes     Alcohol/week: 3.0 standard drinks     Types: 1 Glasses of wine, 1 Cans of beer, 1 Shots of liquor per week     Comment: rarely   • Drug use: Never   • Sexual activity: Yes     Partners: Male     Birth control/protection: Surgical, Post-menopausal, Hysterectomy, Same-sex partner      Comment: My      Review of Systems   Constitutional: Positive for malaise/fatigue.   Cardiovascular: Positive for leg swelling. Negative for chest pain, dyspnea on exertion and palpitations.   Respiratory: Positive for shortness of breath. Negative for cough.    Gastrointestinal: Negative for abdominal pain, nausea and vomiting.   Neurological: Negative for dizziness, focal weakness, headaches, light-headedness and numbness.   All other systems reviewed and are negative.             Objective:     Constitutional:       Appearance: Well-developed.   Eyes:      General: No scleral icterus.     Conjunctiva/sclera: Conjunctivae normal.   HENT:      Head: Normocephalic and atraumatic.   Neck:      Vascular: No carotid bruit or JVD.   Pulmonary:      Effort: Pulmonary effort is normal.      Breath sounds: Normal breath sounds. No wheezing. No rales.   Cardiovascular:      Normal rate. Regular rhythm.   Pulses:     Intact distal pulses.   Abdominal:      General: Bowel sounds are normal.      Palpations: Abdomen is soft.   Musculoskeletal:      Cervical back: Normal range of motion and neck supple. Skin:     General: Skin is warm and dry.      Findings: No rash.   Neurological:      Mental Status: Alert.       Procedures    Lab Review:         MDM  1.  Shortness of breath  Patient has shortness of breath and swelling of the feet and has sleep apnea but does not use a CPAP machine  Patient will have an echocardiogram for LV function valve abnormalities  2.  Hypertension  Patient blood pressure is still high and I will adjust medicines accordingly  3.  Hyperlipidemia  Patient is on statins and the lipid levels are well within normal limits  4.  Sleep apnea  Venous sleep apnea and he must use a CPAP machine.      Patient's previous medical records, labs, and EKG were reviewed and discussed with the patient at today's visit.

## 2023-02-08 NOTE — PATIENT INSTRUCTIONS
Health Maintenance Due   Topic Date Due    COVID-19 Vaccine (5 - Booster for Pfizer series) 03/01/2022    Pneumococcal Vaccine 0-64 (2 - PCV) 09/21/2022    Check blood pressure cuff for accuracy and send 10 blood pressures over 2 weeks.  Watch sodium, alcohol and weight   Rest and fluids and check 4 X/day.  If below 90 needs to call 911 and get ER

## 2023-02-09 ENCOUNTER — PATIENT MESSAGE (OUTPATIENT)
Dept: FAMILY MEDICINE CLINIC | Facility: CLINIC | Age: 63
End: 2023-02-09
Payer: OTHER GOVERNMENT

## 2023-02-09 ENCOUNTER — OFFICE VISIT (OUTPATIENT)
Dept: FAMILY MEDICINE CLINIC | Facility: CLINIC | Age: 63
End: 2023-02-09
Payer: OTHER GOVERNMENT

## 2023-02-09 VITALS
WEIGHT: 254.6 LBS | HEIGHT: 66 IN | BODY MASS INDEX: 40.92 KG/M2 | DIASTOLIC BLOOD PRESSURE: 84 MMHG | OXYGEN SATURATION: 95 % | TEMPERATURE: 98.7 F | SYSTOLIC BLOOD PRESSURE: 152 MMHG | HEART RATE: 92 BPM

## 2023-02-09 DIAGNOSIS — E66.01 CLASS 3 SEVERE OBESITY DUE TO EXCESS CALORIES WITHOUT SERIOUS COMORBIDITY WITH BODY MASS INDEX (BMI) OF 40.0 TO 44.9 IN ADULT: ICD-10-CM

## 2023-02-09 DIAGNOSIS — J02.9 SORE THROAT: ICD-10-CM

## 2023-02-09 DIAGNOSIS — E78.00 PURE HYPERCHOLESTEROLEMIA: ICD-10-CM

## 2023-02-09 DIAGNOSIS — J45.909 ASTHMA, UNSPECIFIED ASTHMA SEVERITY, UNSPECIFIED WHETHER COMPLICATED, UNSPECIFIED WHETHER PERSISTENT: ICD-10-CM

## 2023-02-09 DIAGNOSIS — R05.1 ACUTE COUGH: Primary | ICD-10-CM

## 2023-02-09 DIAGNOSIS — F32.A DEPRESSION, UNSPECIFIED DEPRESSION TYPE: ICD-10-CM

## 2023-02-09 DIAGNOSIS — I10 ESSENTIAL HYPERTENSION: ICD-10-CM

## 2023-02-09 LAB
EXPIRATION DATE: NORMAL
EXPIRATION DATE: NORMAL
FLUAV AG UPPER RESP QL IA.RAPID: NOT DETECTED
FLUBV AG UPPER RESP QL IA.RAPID: NOT DETECTED
INTERNAL CONTROL: NORMAL
INTERNAL CONTROL: NORMAL
Lab: NORMAL
Lab: NORMAL
S PYO AG THROAT QL: NEGATIVE
SARS-COV-2 AG UPPER RESP QL IA.RAPID: NOT DETECTED
SARS-COV-2 ORF1AB RESP QL NAA+PROBE: NOT DETECTED

## 2023-02-09 PROCEDURE — 99214 OFFICE O/P EST MOD 30 MIN: CPT | Performed by: PREVENTIVE MEDICINE

## 2023-02-09 PROCEDURE — 87880 STREP A ASSAY W/OPTIC: CPT | Performed by: PREVENTIVE MEDICINE

## 2023-02-09 PROCEDURE — 87428 SARSCOV & INF VIR A&B AG IA: CPT | Performed by: PREVENTIVE MEDICINE

## 2023-02-09 PROCEDURE — U0004 COV-19 TEST NON-CDC HGH THRU: HCPCS | Performed by: PREVENTIVE MEDICINE

## 2023-02-09 NOTE — PROGRESS NOTES
"Subjective   Eve Garrett is a 62 y.o. female presents for   Chief Complaint   Patient presents with   • Cough   • Nasal Congestion     To clinic patient presents today with cough producing green sputum nasal congestion and sore throat has been ill for several days does work  and has taken the flu shot but is overdue for her COVID vaccinations no known exposure that she is aware of no constipation nausea vomiting or dysuria.  Has had some slight diarrhea but no blood in the stools asthma is usually under good control but she has had to use her inhaler at nighttime since she has been ill patient states that her blood pressure is probably controlled at home but we will have her home monitor and send results to the [facility] depression does seem to be under good control.  Health Maintenance Due   Topic Date Due   • COVID-19 Vaccine (5 - Booster for Pfizer series) 03/01/2022   • Pneumococcal Vaccine 0-64 (2 - PCV) 09/21/2022       History of Present Illness     Vitals:    02/09/23 0853 02/09/23 0901   BP: 140/83 152/84   BP Location: Right arm Left arm   Patient Position: Sitting Sitting   Cuff Size: Adult Large Adult   Pulse: 92    Temp: 98.7 °F (37.1 °C)    TempSrc: Tympanic    SpO2: 95%    Weight: 115 kg (254 lb 9.6 oz)    Height: 168.9 cm (66.5\")      Body mass index is 40.48 kg/m².    Current Outpatient Medications on File Prior to Visit   Medication Sig Dispense Refill   • albuterol sulfate  (90 Base) MCG/ACT inhaler Inhale 2 puffs Every 4 (Four) Hours As Needed for Wheezing. 3 g 3   • atorvastatin (LIPITOR) 20 MG tablet Take 2 tablets by mouth Every Night. 90 tablet 3   • fluticasone (FLONASE) 50 MCG/ACT nasal spray 2 sprays into the nostril(s) as directed by provider Daily. 3 g 3   • Fluticasone-Umeclidin-Vilant (Trelegy Ellipta) 100-62.5-25 MCG/ACT inhaler Inhale 1 puff Daily. 3 each 3   • lisinopril (PRINIVIL,ZESTRIL) 40 MG tablet Take 1 tablet by mouth Daily. 90 tablet 3   • metoprolol tartrate " (LOPRESSOR) 50 MG tablet Take 1 tablet by mouth 2 (Two) Times a Day. 180 tablet 3   • multivitamin (THERAGRAN) tablet tablet Take 1 tablet by mouth Daily.     • omeprazole (priLOSEC) 40 MG capsule Take 1 capsule by mouth Daily. 90 capsule 1   • [DISCONTINUED] amoxicillin (AMOXIL) 875 MG tablet Take 1 tablet by mouth 2 (Two) Times a Day. 20 tablet 0   • montelukast (SINGULAIR) 10 MG tablet Take 1 tablet by mouth Every Night for 14 days. 14 tablet 0     No current facility-administered medications on file prior to visit.       The following portions of the patient's history were reviewed and updated as appropriate: allergies, current medications, past family history, past medical history, past social history, past surgical history and problem list.    Review of Systems   Constitutional: Positive for fever.   HENT: Positive for congestion and sore throat.    Respiratory: Positive for cough.    Gastrointestinal: Positive for diarrhea.   Psychiatric/Behavioral: Positive for depressed mood.       Objective   Physical Exam  Vitals reviewed.   Constitutional:       General: She is not in acute distress.     Appearance: She is well-developed. She is obese. She is not ill-appearing or toxic-appearing.   HENT:      Head: Normocephalic and atraumatic.      Right Ear: Tympanic membrane, ear canal and external ear normal.      Left Ear: Tympanic membrane, ear canal and external ear normal.      Nose: Congestion present.      Mouth/Throat:      Mouth: Mucous membranes are moist.      Pharynx: Posterior oropharyngeal erythema present.   Eyes:      Extraocular Movements: Extraocular movements intact.      Conjunctiva/sclera: Conjunctivae normal.      Pupils: Pupils are equal, round, and reactive to light.   Cardiovascular:      Rate and Rhythm: Normal rate and regular rhythm.      Heart sounds: Normal heart sounds.   Pulmonary:      Effort: Pulmonary effort is normal.   Abdominal:      General: Bowel sounds are normal. There is no  distension.      Palpations: Abdomen is soft. There is no mass.      Tenderness: There is no abdominal tenderness.   Musculoskeletal:      Cervical back: Neck supple.   Skin:     General: Skin is warm.   Neurological:      General: No focal deficit present.      Mental Status: She is alert and oriented to person, place, and time.   Psychiatric:         Mood and Affect: Mood normal.         Behavior: Behavior normal.       PHQ-9 Total Score:      Assessment & Plan   Diagnoses and all orders for this visit:    1. Acute cough (Primary)  Comments:  Cough producing green sputum for the last several days does work in  and is also having a sore throat no fever diarrhea or vomiting  Orders:  -     POCT SARS-CoV-2 Antigen CRUZ + Flu  -     COVID-19,APTIMA PANTHER(ANGELO),[facility], NP/OP SWAB IN UTM/VTM/SALINE TRANSPORT MEDIA,24 HR TAT - Swab, Nasopharynx; Future  -     COVID-19,APTIMA PANTHER(ANGELO),[facility], NP/OP SWAB IN UTM/VTM/SALINE TRANSPORT MEDIA,24 HR TAT - Swab, Nasopharynx    2. Asthma, unspecified asthma severity, unspecified whether complicated, unspecified whether persistent  Comments:  Controlled prior to this upper respiratory infection    3. Depression, unspecified depression type  Comments:  's cancer is in remission.  No HI or SI    4. Essential hypertension  Comments:  Not controlled and will home monitor    5. Sore throat  -     POCT rapid strep A    6. Pure hypercholesterolemia  Comments:  [id number] walking  Assessment & Plan:  Lipid abnormalities are unchanged.  Pharmacotherapy as ordered.  Lipids will be reassessed in 6 months.      7. Class 3 severe obesity due to excess calories without serious comorbidity with body mass index (BMI) of 40.0 to 44.9 in adult (HCC)    Other orders  -     tripJane BP Flowsheet      Patient Instructions     Health Maintenance Due   Topic Date Due   • COVID-19 Vaccine (5 - Booster for Pfizer series) 03/01/2022   • Pneumococcal Vaccine 0-64 (2 - PCV)  09/21/2022    Check blood pressure cuff for accuracy and send 10 blood pressures over 2 weeks.  Watch sodium, alcohol and weight   Rest and fluids and check 4 X/day.  If below 90 needs to call 911 and get ER

## 2023-02-10 ENCOUNTER — TELEPHONE (OUTPATIENT)
Dept: FAMILY MEDICINE CLINIC | Facility: CLINIC | Age: 63
End: 2023-02-10
Payer: OTHER GOVERNMENT

## 2023-02-10 RX ORDER — AMOXICILLIN 875 MG/1
875 TABLET, COATED ORAL 2 TIMES DAILY
Qty: 20 TABLET | Refills: 0 | Status: SHIPPED | OUTPATIENT
Start: 2023-02-10 | End: 2023-02-17 | Stop reason: HOSPADM

## 2023-02-10 NOTE — TELEPHONE ENCOUNTER
Cefdinir was sent to [facility] at [facility] Hope you feel better call back next week if not we should do some x-rays.

## 2023-02-10 NOTE — PROGRESS NOTES
Rapid strep and COVID PCR were both negative.  If she develops a fever thickening to sputum or nasal discharge or fever we could consider antibiotics or x-ray call if any other questions or concerns.

## 2023-02-10 NOTE — TELEPHONE ENCOUNTER
HUB TO READ:  ----- Message from Nunu Kingston MD sent at 2/10/2023  7:32 AM EST -----  Rapid strep and COVID PCR were both negative.  If she develops a fever thickening to sputum or nasal discharge or fever we could consider antibiotics or x-ray call if any other questions or concerns.

## 2023-02-10 NOTE — TELEPHONE ENCOUNTER
Talked to pt and she states that all night and today she has been chilled and then hot so she would appreciate an antibiotic.

## 2023-02-15 ENCOUNTER — APPOINTMENT (OUTPATIENT)
Dept: GENERAL RADIOLOGY | Facility: HOSPITAL | Age: 63
End: 2023-02-15
Payer: OTHER GOVERNMENT

## 2023-02-15 ENCOUNTER — HOSPITAL ENCOUNTER (OUTPATIENT)
Facility: HOSPITAL | Age: 63
Setting detail: OBSERVATION
Discharge: HOME | End: 2023-02-17
Attending: EMERGENCY MEDICINE | Admitting: STUDENT IN AN ORGANIZED HEALTH CARE EDUCATION/TRAINING PROGRAM
Payer: OTHER GOVERNMENT

## 2023-02-15 ENCOUNTER — APPOINTMENT (OUTPATIENT)
Dept: CT IMAGING | Facility: HOSPITAL | Age: 63
End: 2023-02-15
Payer: OTHER GOVERNMENT

## 2023-02-15 DIAGNOSIS — R06.02 SHORTNESS OF BREATH: ICD-10-CM

## 2023-02-15 DIAGNOSIS — J18.9 PNEUMONIA OF LEFT LOWER LOBE DUE TO INFECTIOUS ORGANISM: Primary | ICD-10-CM

## 2023-02-15 LAB
ALBUMIN SERPL-MCNC: 3.5 G/DL (ref 3.5–5.2)
ALBUMIN/GLOB SERPL: 0.8 G/DL
ALP SERPL-CCNC: 121 U/L (ref 39–117)
ALT SERPL W P-5'-P-CCNC: 15 U/L (ref 1–33)
ANION GAP SERPL CALCULATED.3IONS-SCNC: 12 MMOL/L (ref 5–15)
AST SERPL-CCNC: 14 U/L (ref 1–32)
B PARAPERT DNA SPEC QL NAA+PROBE: NOT DETECTED
B PERT DNA SPEC QL NAA+PROBE: NOT DETECTED
BASOPHILS # BLD AUTO: 0.1 10*3/MM3 (ref 0–0.2)
BASOPHILS NFR BLD AUTO: 0.4 % (ref 0–1.5)
BILIRUB SERPL-MCNC: 0.5 MG/DL (ref 0–1.2)
BUN SERPL-MCNC: 11 MG/DL (ref 8–23)
BUN/CREAT SERPL: 15.7 (ref 7–25)
C PNEUM DNA NPH QL NAA+NON-PROBE: NOT DETECTED
CALCIUM SPEC-SCNC: 9.7 MG/DL (ref 8.6–10.5)
CHLORIDE SERPL-SCNC: 100 MMOL/L (ref 98–107)
CO2 SERPL-SCNC: 21 MMOL/L (ref 22–29)
CREAT SERPL-MCNC: 0.7 MG/DL (ref 0.57–1)
DEPRECATED RDW RBC AUTO: 45.9 FL (ref 37–54)
EGFRCR SERPLBLD CKD-EPI 2021: 98.5 ML/MIN/1.73
EOSINOPHIL # BLD AUTO: 0.1 10*3/MM3 (ref 0–0.4)
EOSINOPHIL NFR BLD AUTO: 0.3 % (ref 0.3–6.2)
ERYTHROCYTE [DISTWIDTH] IN BLOOD BY AUTOMATED COUNT: 16.2 % (ref 12.3–15.4)
FLUAV SUBTYP SPEC NAA+PROBE: NOT DETECTED
FLUBV RNA ISLT QL NAA+PROBE: NOT DETECTED
GEN 5 2HR TROPONIN T REFLEX: 8 NG/L
GLOBULIN UR ELPH-MCNC: 4.4 GM/DL
GLUCOSE SERPL-MCNC: 158 MG/DL (ref 65–99)
HADV DNA SPEC NAA+PROBE: NOT DETECTED
HCOV 229E RNA SPEC QL NAA+PROBE: NOT DETECTED
HCOV HKU1 RNA SPEC QL NAA+PROBE: NOT DETECTED
HCOV NL63 RNA SPEC QL NAA+PROBE: NOT DETECTED
HCOV OC43 RNA SPEC QL NAA+PROBE: NOT DETECTED
HCT VFR BLD AUTO: 34.4 % (ref 34–46.6)
HGB BLD-MCNC: 10.9 G/DL (ref 12–15.9)
HMPV RNA NPH QL NAA+NON-PROBE: NOT DETECTED
HPIV1 RNA ISLT QL NAA+PROBE: NOT DETECTED
HPIV2 RNA SPEC QL NAA+PROBE: NOT DETECTED
HPIV3 RNA NPH QL NAA+PROBE: NOT DETECTED
HPIV4 P GENE NPH QL NAA+PROBE: NOT DETECTED
LYMPHOCYTES # BLD AUTO: 0.9 10*3/MM3 (ref 0.7–3.1)
LYMPHOCYTES NFR BLD AUTO: 5 % (ref 19.6–45.3)
M PNEUMO IGG SER IA-ACNC: NOT DETECTED
MCH RBC QN AUTO: 24.1 PG (ref 26.6–33)
MCHC RBC AUTO-ENTMCNC: 31.8 G/DL (ref 31.5–35.7)
MCV RBC AUTO: 75.8 FL (ref 79–97)
MONOCYTES # BLD AUTO: 2.1 10*3/MM3 (ref 0.1–0.9)
MONOCYTES NFR BLD AUTO: 11.4 % (ref 5–12)
NEUTROPHILS NFR BLD AUTO: 15.5 10*3/MM3 (ref 1.7–7)
NEUTROPHILS NFR BLD AUTO: 82.9 % (ref 42.7–76)
NRBC BLD AUTO-RTO: 0 /100 WBC (ref 0–0.2)
NT-PROBNP SERPL-MCNC: 59.2 PG/ML (ref 0–900)
PLATELET # BLD AUTO: 376 10*3/MM3 (ref 140–450)
PMV BLD AUTO: 7.2 FL (ref 6–12)
POTASSIUM SERPL-SCNC: 3.7 MMOL/L (ref 3.5–5.2)
PROT SERPL-MCNC: 7.9 G/DL (ref 6–8.5)
RBC # BLD AUTO: 4.54 10*6/MM3 (ref 3.77–5.28)
RHINOVIRUS RNA SPEC NAA+PROBE: NOT DETECTED
RSV RNA NPH QL NAA+NON-PROBE: NOT DETECTED
SARS-COV-2 RNA NPH QL NAA+NON-PROBE: NOT DETECTED
SODIUM SERPL-SCNC: 133 MMOL/L (ref 136–145)
TROPONIN T DELTA: -1 NG/L
TROPONIN T SERPL HS-MCNC: 9 NG/L
WBC NRBC COR # BLD: 18.7 10*3/MM3 (ref 3.4–10.8)

## 2023-02-15 PROCEDURE — 25010000002 ENOXAPARIN PER 10 MG: Performed by: INTERNAL MEDICINE

## 2023-02-15 PROCEDURE — 93005 ELECTROCARDIOGRAM TRACING: CPT | Performed by: PHYSICIAN ASSISTANT

## 2023-02-15 PROCEDURE — 36415 COLL VENOUS BLD VENIPUNCTURE: CPT

## 2023-02-15 PROCEDURE — 87040 BLOOD CULTURE FOR BACTERIA: CPT | Performed by: NURSE PRACTITIONER

## 2023-02-15 PROCEDURE — 94799 UNLISTED PULMONARY SVC/PX: CPT

## 2023-02-15 PROCEDURE — 71250 CT THORAX DX C-: CPT

## 2023-02-15 PROCEDURE — 25010000002 CEFTRIAXONE PER 250 MG: Performed by: NURSE PRACTITIONER

## 2023-02-15 PROCEDURE — G0378 HOSPITAL OBSERVATION PER HR: HCPCS

## 2023-02-15 PROCEDURE — 96365 THER/PROPH/DIAG IV INF INIT: CPT

## 2023-02-15 PROCEDURE — 96372 THER/PROPH/DIAG INJ SC/IM: CPT

## 2023-02-15 PROCEDURE — 0202U NFCT DS 22 TRGT SARS-COV-2: CPT | Performed by: PHYSICIAN ASSISTANT

## 2023-02-15 PROCEDURE — 99285 EMERGENCY DEPT VISIT HI MDM: CPT

## 2023-02-15 PROCEDURE — 71045 X-RAY EXAM CHEST 1 VIEW: CPT

## 2023-02-15 PROCEDURE — 94640 AIRWAY INHALATION TREATMENT: CPT

## 2023-02-15 PROCEDURE — 83880 ASSAY OF NATRIURETIC PEPTIDE: CPT | Performed by: PHYSICIAN ASSISTANT

## 2023-02-15 PROCEDURE — 80053 COMPREHEN METABOLIC PANEL: CPT | Performed by: PHYSICIAN ASSISTANT

## 2023-02-15 PROCEDURE — 84484 ASSAY OF TROPONIN QUANT: CPT | Performed by: PHYSICIAN ASSISTANT

## 2023-02-15 PROCEDURE — 85025 COMPLETE CBC W/AUTO DIFF WBC: CPT | Performed by: PHYSICIAN ASSISTANT

## 2023-02-15 PROCEDURE — 25010000002 AZITHROMYCIN PER 500 MG: Performed by: NURSE PRACTITIONER

## 2023-02-15 RX ORDER — SODIUM CHLORIDE 9 MG/ML
40 INJECTION, SOLUTION INTRAVENOUS AS NEEDED
Status: DISCONTINUED | OUTPATIENT
Start: 2023-02-15 | End: 2023-02-17 | Stop reason: HOSPADM

## 2023-02-15 RX ORDER — CETIRIZINE HYDROCHLORIDE 10 MG/1
10 TABLET ORAL DAILY
Status: DISCONTINUED | OUTPATIENT
Start: 2023-02-16 | End: 2023-02-17 | Stop reason: HOSPADM

## 2023-02-15 RX ORDER — NITROGLYCERIN 0.4 MG/1
0.4 TABLET SUBLINGUAL
Status: DISCONTINUED | OUTPATIENT
Start: 2023-02-15 | End: 2023-02-17 | Stop reason: HOSPADM

## 2023-02-15 RX ORDER — PANTOPRAZOLE SODIUM 40 MG/1
40 TABLET, DELAYED RELEASE ORAL
Status: DISCONTINUED | OUTPATIENT
Start: 2023-02-16 | End: 2023-02-17 | Stop reason: HOSPADM

## 2023-02-15 RX ORDER — ACETAMINOPHEN 650 MG/1
650 SUPPOSITORY RECTAL EVERY 4 HOURS PRN
Status: DISCONTINUED | OUTPATIENT
Start: 2023-02-15 | End: 2023-02-17 | Stop reason: HOSPADM

## 2023-02-15 RX ORDER — ENOXAPARIN SODIUM 100 MG/ML
40 INJECTION SUBCUTANEOUS EVERY 12 HOURS
Status: DISCONTINUED | OUTPATIENT
Start: 2023-02-15 | End: 2023-02-17 | Stop reason: HOSPADM

## 2023-02-15 RX ORDER — LISINOPRIL 20 MG/1
40 TABLET ORAL DAILY
Status: DISCONTINUED | OUTPATIENT
Start: 2023-02-16 | End: 2023-02-17 | Stop reason: HOSPADM

## 2023-02-15 RX ORDER — ONDANSETRON 2 MG/ML
4 INJECTION INTRAMUSCULAR; INTRAVENOUS EVERY 6 HOURS PRN
Status: DISCONTINUED | OUTPATIENT
Start: 2023-02-15 | End: 2023-02-17 | Stop reason: HOSPADM

## 2023-02-15 RX ORDER — CHOLECALCIFEROL (VITAMIN D3) 125 MCG
5 CAPSULE ORAL NIGHTLY PRN
Status: DISCONTINUED | OUTPATIENT
Start: 2023-02-15 | End: 2023-02-17 | Stop reason: HOSPADM

## 2023-02-15 RX ORDER — MULTIPLE VITAMINS W/ MINERALS TAB 9MG-400MCG
1 TAB ORAL DAILY
COMMUNITY
End: 2023-02-17 | Stop reason: HOSPADM

## 2023-02-15 RX ORDER — BUDESONIDE 0.5 MG/2ML
1 INHALANT ORAL
Status: DISCONTINUED | OUTPATIENT
Start: 2023-02-15 | End: 2023-02-17 | Stop reason: HOSPADM

## 2023-02-15 RX ORDER — CETIRIZINE HYDROCHLORIDE 10 MG/1
10 TABLET ORAL DAILY
COMMUNITY

## 2023-02-15 RX ORDER — IPRATROPIUM BROMIDE AND ALBUTEROL SULFATE 2.5; .5 MG/3ML; MG/3ML
3 SOLUTION RESPIRATORY (INHALATION)
Status: DISCONTINUED | OUTPATIENT
Start: 2023-02-15 | End: 2023-02-17 | Stop reason: HOSPADM

## 2023-02-15 RX ORDER — ATORVASTATIN CALCIUM 40 MG/1
40 TABLET, FILM COATED ORAL NIGHTLY
Status: DISCONTINUED | OUTPATIENT
Start: 2023-02-15 | End: 2023-02-17 | Stop reason: HOSPADM

## 2023-02-15 RX ORDER — SODIUM CHLORIDE 0.9 % (FLUSH) 0.9 %
10 SYRINGE (ML) INJECTION EVERY 12 HOURS SCHEDULED
Status: DISCONTINUED | OUTPATIENT
Start: 2023-02-15 | End: 2023-02-17 | Stop reason: HOSPADM

## 2023-02-15 RX ORDER — SODIUM CHLORIDE 0.9 % (FLUSH) 0.9 %
10 SYRINGE (ML) INJECTION AS NEEDED
Status: DISCONTINUED | OUTPATIENT
Start: 2023-02-15 | End: 2023-02-17 | Stop reason: HOSPADM

## 2023-02-15 RX ORDER — METOPROLOL TARTRATE 50 MG/1
50 TABLET, FILM COATED ORAL 2 TIMES DAILY
Status: DISCONTINUED | OUTPATIENT
Start: 2023-02-15 | End: 2023-02-17 | Stop reason: HOSPADM

## 2023-02-15 RX ADMIN — Medication 10 ML: at 21:34

## 2023-02-15 RX ADMIN — IPRATROPIUM BROMIDE AND ALBUTEROL SULFATE 3 ML: 2.5; .5 SOLUTION RESPIRATORY (INHALATION) at 19:35

## 2023-02-15 RX ADMIN — AZITHROMYCIN MONOHYDRATE 500 MG: 500 INJECTION, POWDER, LYOPHILIZED, FOR SOLUTION INTRAVENOUS at 13:13

## 2023-02-15 RX ADMIN — METOPROLOL TARTRATE 50 MG: 50 TABLET, FILM COATED ORAL at 21:33

## 2023-02-15 RX ADMIN — IPRATROPIUM BROMIDE AND ALBUTEROL SULFATE 3 ML: 2.5; .5 SOLUTION RESPIRATORY (INHALATION) at 14:59

## 2023-02-15 RX ADMIN — BUDESONIDE 1 MG: 0.5 INHALANT RESPIRATORY (INHALATION) at 19:36

## 2023-02-15 RX ADMIN — CEFTRIAXONE 2 G: 2 INJECTION, POWDER, FOR SOLUTION INTRAMUSCULAR; INTRAVENOUS at 13:12

## 2023-02-15 RX ADMIN — ATORVASTATIN CALCIUM 40 MG: 40 TABLET, FILM COATED ORAL at 21:33

## 2023-02-15 RX ADMIN — ENOXAPARIN SODIUM 40 MG: 100 INJECTION SUBCUTANEOUS at 21:33

## 2023-02-15 NOTE — ED PROVIDER NOTES
Subjective    Provider in Triage Note  Patient is a 62-year-old female presents to the ED with complaints of productive cough, posttussis emesis, lateral chest wall pain, and shortness of breath since around Thanksgiving.  She was initially diagnosed with influenza at the end of November but states she has not gotten any better since.  She has been on 5 rounds of antibiotics since then with no improvement.  She denies any hemoptysis.  Patient's  in triage states she does get an echocardiogram 2 weeks ago due to signs of an enlarged heart on a recent chest x-ray but was not able to do it due to the coughing.    Per telephone encounter from 2/10/2023 patient was recently put on cefdinir      History of Present Illness  Provider in triage note reviewed and agree with above.  Additionally, patient states she has felt worse in the last 2 days.  No fever but states she had some chills.  No leg pain or swelling.        Review of Systems   Constitutional: Positive for chills. Negative for fever.   Respiratory: Positive for cough and shortness of breath.    Cardiovascular: Positive for chest pain. Negative for leg swelling.   Gastrointestinal: Negative.        Past Medical History:   Diagnosis Date   • Allergic rhinitis    • Anemia 04/2020   • Asthma 11/04/2011   • Calcaneal spur of foot, right 02/20/2021    XRAY AT [facility]   • Cancer (HCC)     Appendcele mucous neoplasm   • Constipation 07/11/2012   • Contact dermatitis 07/16/2015   • Depression 05/28/2013   • Disease of thyroid gland     HYPOTHYROIDISM   • Diverticulitis 02/01/2012   • Dyspnea 07/13/2020    SEEN AT [facility] ER   • Gastroesophageal reflux disease 05/28/2013   • Hyperlipidemia    • Hypertension    • MRSA infection 04/25/2020   • Myalgia and myositis 09/13/2011   • Osteoarthritis 10/30/2014   • Ovarian cyst 04/23/2013   • Ovarian enlargement, left    • Plantar fasciitis, right 02/20/2021    SEEN AT [facility] ER   •  Seasonal allergies    • Sleep apnea 05/21/2021    NO CPAP       Allergies   Allergen Reactions   • Promethazine Anxiety and Hallucinations     AKA PHENERGAN, Delusions, hallucinations       Past Surgical History:   Procedure Laterality Date   • APPENDECTOMY N/A 03/20/2020    LAPAROSCOPIC, DR. WILLIAM CHEADLE AT [place]   • CARDIAC CATHETERIZATION Left 10/02/2009    No Intervention   • CHOLECYSTECTOMY N/A 02/08/2005    DR. CHRIS LOZA AT [facility]   • COLON RESECTION LEFT Left 2010    D/T RUPTURED DIVERTICULA, DR. DEA CROWE   • COLON RESECTION SMALL BOWEL N/A 04/07/2020    LAPAROSCOPY, MOBILIZATION OF RIGHT COLON, CONVERSION TO OPEN WITH RIGHT TRANSVERSE INCISION, EXTENSIVE LYSIS OF ADHESIONS, AND ILEOCECTOMY, DR. WILLIAM CHEADLE AT [place]   • COLONOSCOPY N/A 03/2012    WITH ILEOSCOPY   • COLONOSCOPY N/A 11/09/2020    A FEW DIVERTICULA IN DESCENDING AND SIGMOID, MILD INTERNAL HEMORRHOIDS, RLQ ANASTAMOSIS, BX: REACTIVE ILEAL MUCOSA WITH MILD CHRONIC ILEITIS, RESCOPE IN 3 YRS, DR. AUSTEN NÚÑEZ AT [facility]   • COLONOSCOPY W/ POLYPECTOMY N/A 02/24/2017    BULBUOUS APPENDIX, POLYPS   • DIAGNOSTIC LAPAROSCOPY N/A 1993    FOR ENDOMETRIOSIS   • DIAGNOSTIC LAPAROSCOPY N/A 1992    FOR ENDOMETRIOSIS   • ENDOSCOPY AND COLONOSCOPY N/A 2009   • ERCP WITH SPHINCTEROTOMY/PAPILLOTOMY N/A 2005    WITH STENT   • FOOT SURGERY Right 2003   • HEMICOLECTOMY Left 06/13/2012    LAPAROSCOPIC LEFT AND SIGMOID HEMICOLECTOMY WITH TRANSVERSE RECTAL ANASTAMOSIS AND LAPAROSCOPIC TAKEDOWN OF SPLENIC FLEXURE, D/T DIVERTICULAR PERFORATION WITH ABSCESS, DR. ABY MCFARLAND AT [facility]   • HYSTERECTOMY N/A 1992    ROZINA WITH BSO       Family History   Problem Relation Age of Onset   • Cancer Mother    • Liver cancer Mother    • Basal cell carcinoma Mother    • Diabetes Mother    • Cancer Father    • Lung cancer Father    • Hypertension Father         And also my mother had as well as my self   • Celiac disease Father    • Heart disease  Father    • Diverticulitis Sister    • Hypertension Sister    • Cancer Brother         BLADDER CANCER   • Cancer Brother    • Stomach cancer Brother    • Cancer Maternal Aunt    • Ovarian cancer Maternal Aunt    • Cancer Paternal Aunt    • Breast cancer Paternal Aunt    • Cancer Paternal Uncle    • Stomach cancer Paternal Uncle    • Heart disease Maternal Grandmother    • Heart disease Paternal Grandmother    • Heart failure Paternal Grandmother        Social History     Socioeconomic History   • Marital status:    Tobacco Use   • Smoking status: Never   • Smokeless tobacco: Never   • Tobacco comments:     My father was a heavy smoker, and my brothers all smoke   Vaping Use   • Vaping Use: Never used   Substance and Sexual Activity   • Alcohol use: Yes     Alcohol/week: 3.0 standard drinks     Types: 1 Glasses of wine, 1 Cans of beer, 1 Shots of liquor per week     Comment: rarely   • Drug use: Never   • Sexual activity: Yes     Partners: Male     Birth control/protection: Surgical, Post-menopausal, Hysterectomy, Same-sex partner     Comment: My            Objective   Physical Exam  Vital signs and triage nurse note reviewed.  Constitutional: Awake, alert; well-developed and well-nourished. No acute distress is noted.  Obese.  HEENT: Normocephalic, atraumatic; pupils are PERRL with intact EOM; oropharynx is pink and moist without exudate or erythema.  No drooling or pooling of oral secretions.  Neck: Supple, full range of motion without pain; no cervical lymphadenopathy. Normal phonation.  Cardiovascular: Regular rate and rhythm, normal S1-S2.  No murmur noted.  Pulmonary: Respiratory effort regular nonlabored, breath sounds clear to auscultation all fields.  Abdomen: Soft, nontender, nondistended with normoactive bowel sounds; no rebound or guarding.  Musculoskeletal: Independent range of motion of all extremities with no palpable tenderness or edema.  Neuro: Alert oriented x3, speech is clear and  appropriate, GCS 15.    Skin: Flesh tone, warm, dry, intact; no erythematous or petechial rash or lesion.      Procedures           ED Course      Labs Reviewed   COMPREHENSIVE METABOLIC PANEL - Abnormal; Notable for the following components:       Result Value    Glucose 158 (*)     Sodium 133 (*)     CO2 21.0 (*)     Alkaline Phosphatase 121 (*)     All other components within normal limits    Narrative:     GFR Normal >60  Chronic Kidney Disease <60  Kidney Failure <15     CBC WITH AUTO DIFFERENTIAL - Abnormal; Notable for the following components:    WBC 18.70 (*)     Hemoglobin 10.9 (*)     MCV 75.8 (*)     MCH 24.1 (*)     RDW 16.2 (*)     Neutrophil % 82.9 (*)     Lymphocyte % 5.0 (*)     Neutrophils, Absolute 15.50 (*)     Monocytes, Absolute 2.10 (*)     All other components within normal limits   RESPIRATORY PANEL PCR W/ COVID-19 (SARS-COV-2) [facility] IN-HOUSE, NP SWAB IN UTM/VTP, 3-4 HR TAT - Normal    Narrative:     In the setting of a positive respiratory panel with a viral infection PLUS a negative procalcitonin without other underlying concern for bacterial infection, consider observing off antibiotics or discontinuation of antibiotics and continue supportive care. If the respiratory panel is positive for atypical bacterial infection (Bordetella pertussis, Chlamydophila pneumoniae, or Mycoplasma pneumoniae), consider antibiotic de-escalation to target atypical bacterial infection.   TROPONIN - Normal    Narrative:     High Sensitive Troponin T Reference Range:  <10.0 ng/L- Negative Female for AMI  <15.0 ng/L- Negative Male for AMI  >=10 - Abnormal Female indicating possible myocardial injury.  >=15 - Abnormal Male indicating possible myocardial injury.   Clinicians would have to utilize clinical acumen, EKG, Troponin, and serial changes to determine if it is an Acute Myocardial Infarction or myocardial injury due to an underlying chronic condition.        BNP (IN-HOUSE) - Normal     Narrative:     Among patients with dyspnea, NT-proBNP is highly sensitive for the detection of acute congestive heart failure. In addition NT-proBNP of <300 pg/ml effectively rules out acute congestive heart failure with 99% negative predictive value.    Results may be falsely decreased if patient taking Biotin.     HIGH SENSITIVITIY TROPONIN T 2HR - Normal    Narrative:     High Sensitive Troponin T Reference Range:  <10.0 ng/L- Negative Female for AMI  <15.0 ng/L- Negative Male for AMI  >=10 - Abnormal Female indicating possible myocardial injury.  >=15 - Abnormal Male indicating possible myocardial injury.   Clinicians would have to utilize clinical acumen, EKG, Troponin, and serial changes to determine if it is an Acute Myocardial Infarction or myocardial injury due to an underlying chronic condition.        BLOOD CULTURE   BLOOD CULTURE   CBC AND DIFFERENTIAL    Narrative:     The following orders were created for panel order CBC & Differential.  Procedure                               Abnormality         Status                     ---------                               -----------         ------                     CBC Auto Differential[[id number]]        Abnormal            Final result                 Please view results for these tests on the individual orders.     XR Chest 1 View    Result Date: 2/15/2023  New left basilar airspace opacities suspicious for pneumonia.  Electronically Signed: Jaylan Cisneros  2/15/2023 10:36 AM EST  Workstation ID: [id number]    Medications   sodium chloride 0.9 % flush 10 mL (has no administration in time range)   cefTRIAXone (ROCEPHIN) 2 g in sodium chloride 0.9 % 100 mL IVPB (has no administration in time range)   azithromycin (ZITHROMAX) 500 mg in sodium chloride 0.9 % 250 mL IVPB-VTB (has no administration in time range)                                          Medical Decision Making  Patient presents from home with complaints of increased shortness of breath and cough over  the last 2 days.  She states she has been ill since Thanksgiving with a productive cough with green sputum.  States has been on multiple rounds of antibiotics.    She had above exam and evaluation.  IV was established.  Labs EKG and chest x-ray were obtained.    Work-up: EKG reviewed and interpreted by myself and corroborated by Dr. Tavares shows sinus rhythm with ventricular rate of 83, no acute ST or T wave changes.  CBC is significant for WBC of 18.7 with 82% neutrophils, no bands.  Metabolic panel is grossly unremarkable.  proBNP within normal limits at 59.  High-sensitivity troponin within normal limits at 9.  Viral respiratory panel is negative.  Chest x-ray reviewed and interpreted by myself but corroborated by the radiologist shows left basilar opacity suspicious for pneumonia.    Reexamination patient is resting quietly and in no distress.  Her bedside O2 saturation is at 91 to 92% on room air.  She is in no distress.  States she does not normally wear oxygen at home.  She has no prior respiratory history.    Given that she has been on several rounds of antibiotics with no improvement and pneumonia on her chest x-ray today with 18,000 white count she will be admitted to the hospital for IV antibiotics.  She is hemodynamically stable.    Case and plan discussed with the hospitalist PA.    Diagnosis and treatment plan discussed with patient.  Patient agreeable to plan.       Pneumonia of left lower lobe due to infectious organism: acute illness or injury  Shortness of breath: chronic illness or injury  Risk  Decision regarding hospitalization.          Final diagnoses:   Pneumonia of left lower lobe due to infectious organism   Shortness of breath       ED Disposition  ED Disposition     ED Disposition   Decision to Admit    Condition   --    Comment   --             No follow-up provider specified.       Medication List      No changes were made to your prescriptions during this visit.          Martir  Elayne, APRN  02/15/23 1231

## 2023-02-15 NOTE — H&P
[facility] Medicine Services  History & Physical    Patient Name: Eve Garrett  : 1960  MRN: [id number]  Primary Care Physician:  Nunu Kingston MD  Date of admission: 2/15/2023  Date and Time of Service: 2/15/2023    Subjective      Chief Complaint: Cough and shortness of air    History of Present Illness: Eve Garrett is a 62 y.o. female with past medical history of asthma, GERD, hypertension, hyperlipidemia, sleep apnea who presented to [facility] on 2/15/2023 complaining of productive cough, and shortness of air.  She states she has been feeling sick with a cough since 2022.  She states that she has received 3-4 rounds of antibiotics outpatient without much improvement of her symptoms.  She is currently been on amoxicillin since Saturday and denies any improvement of her symptoms.  She states that over the course of the past 4 months her cough has been productive and the sputum has progressed from a pale green color to light pink to green and gray.  She states she has not had an appetite for the past week and complains of nausea, vomiting, and diarrhea.  Patient also has sleep apnea but she reports she has not been on her CPAP machine because it is broken.  She is not on home oxygen currently.  She also has been experiencing occasional night sweats, fevers and chills. she denies any hemoptysis or hematochezia.    Work-up in the ED, troponin was 9 trended down to 8, glucose 158, sodium 133, potassium 3.7, creatinine 0.7, BUN 11, EGFR 98.5, ALT 15, AST 14, WBC 18.7, hemoglobin 10.9, blood cultures pending, respiratory panel negative, chest x-ray shows new left basilar airspace opacities suspicious for pneumonia.  EKG showed sinus rhythm.      Review of Systems   Constitutional: Positive for chills, fever and night sweats.   Respiratory: Positive for cough, shortness of breath and sputum production.    Gastrointestinal: Positive for diarrhea, nausea and vomiting.    All other systems reviewed and are negative.      Personal History     Past Medical History:   Diagnosis Date   • Allergic rhinitis    • Anemia 04/2020   • Asthma 11/04/2011   • Calcaneal spur of foot, right 02/20/2021    XRAY AT [facility]   • Cancer (HCC)     Appendcele mucous neoplasm   • Constipation 07/11/2012   • Contact dermatitis 07/16/2015   • Depression 05/28/2013   • Disease of thyroid gland     HYPOTHYROIDISM   • Diverticulitis 02/01/2012   • Dyspnea 07/13/2020    SEEN AT [facility] ER   • Gastroesophageal reflux disease 05/28/2013   • Hyperlipidemia    • Hypertension    • MRSA infection 04/25/2020   • Myalgia and myositis 09/13/2011   • Osteoarthritis 10/30/2014   • Ovarian cyst 04/23/2013   • Ovarian enlargement, left    • Plantar fasciitis, right 02/20/2021    SEEN AT [facility]   • Seasonal allergies    • Sleep apnea 05/21/2021    NO CPAP       Past Surgical History:   Procedure Laterality Date   • APPENDECTOMY N/A 03/20/2020    LAPAROSCOPIC, DR. WILLIAM CHEADLE AT [place]   • CARDIAC CATHETERIZATION Left 10/02/2009    No Intervention   • CHOLECYSTECTOMY N/A 02/08/2005    DR. CHRIS LOZA AT [facility]   • COLON RESECTION LEFT Left 2010    D/T RUPTURED DIVERTICULA, DR. DEA CROWE   • COLON RESECTION SMALL BOWEL N/A 04/07/2020    LAPAROSCOPY, MOBILIZATION OF RIGHT COLON, CONVERSION TO OPEN WITH RIGHT TRANSVERSE INCISION, EXTENSIVE LYSIS OF ADHESIONS, AND ILEOCECTOMY, DR. WILLIAM CHEADLE AT [place]   • COLONOSCOPY N/A 03/2012    WITH ILEOSCOPY   • COLONOSCOPY N/A 11/09/2020    A FEW DIVERTICULA IN DESCENDING AND SIGMOID, MILD INTERNAL HEMORRHOIDS, RLQ ANASTAMOSIS, BX: REACTIVE ILEAL MUCOSA WITH MILD CHRONIC ILEITIS, RESCOPE IN 3 YRS, DR. AUSTEN NÚÑEZ AT [facility]   • COLONOSCOPY W/ POLYPECTOMY N/A 02/24/2017    BULBUOUS APPENDIX, POLYPS   • DIAGNOSTIC LAPAROSCOPY N/A 1993    FOR ENDOMETRIOSIS   • DIAGNOSTIC LAPAROSCOPY N/A 1992    FOR ENDOMETRIOSIS   •  ENDOSCOPY AND COLONOSCOPY N/A 2009   • ERCP WITH SPHINCTEROTOMY/PAPILLOTOMY N/A 2005    WITH STENT   • FOOT SURGERY Right 2003   • HEMICOLECTOMY Left 06/13/2012    LAPAROSCOPIC LEFT AND SIGMOID HEMICOLECTOMY WITH TRANSVERSE RECTAL ANASTAMOSIS AND LAPAROSCOPIC TAKEDOWN OF SPLENIC FLEXURE, D/T DIVERTICULAR PERFORATION WITH ABSCESS, DR. ABY MCFARLAND AT [facility]   • HYSTERECTOMY N/A 1992    ROZINA WITH BSO       Family History: family history includes Basal cell carcinoma in her mother; Breast cancer in her paternal aunt; Cancer in her brother, brother, father, maternal aunt, mother, paternal aunt, and paternal uncle; Celiac disease in her father; Diabetes in her mother; Diverticulitis in her sister; Heart disease in her father, maternal grandmother, and paternal grandmother; Heart failure in her paternal grandmother; Hypertension in her father and sister; Liver cancer in her mother; Lung cancer in her father; Ovarian cancer in her maternal aunt; Stomach cancer in her brother and paternal uncle. Otherwise pertinent FHx was reviewed and not pertinent to current issue.    Social History:  reports that she has never smoked. She has never used smokeless tobacco. She reports current alcohol use of about 3.0 standard drinks per week. She reports that she does not use drugs.    Home Medications:  Prior to Admission Medications     Prescriptions Last Dose Informant Patient Reported? Taking?    albuterol sulfate  (90 Base) MCG/ACT inhaler   No Yes    Inhale 2 puffs Every 4 (Four) Hours As Needed for Wheezing.    amoxicillin (AMOXIL) 875 MG tablet   No Yes    Take 1 tablet by mouth 2 (Two) Times a Day for 10 days.    atorvastatin (LIPITOR) 20 MG tablet   No Yes    Take 2 tablets by mouth Every Night.    cetirizine (zyrTEC) 10 MG tablet   Yes Yes    Take 10 mg by mouth Daily.    fluticasone (FLONASE) 50 MCG/ACT nasal spray   No Yes    2 sprays into the nostril(s) as directed by provider Daily.     Fluticasone-Umeclidin-Vilant (Trelegy Ellipta) 100-62.5-25 MCG/ACT inhaler   No Yes    Inhale 1 puff Daily.    lisinopril (PRINIVIL,ZESTRIL) 40 MG tablet   No Yes    Take 1 tablet by mouth Daily.    metoprolol tartrate (LOPRESSOR) 50 MG tablet   No Yes    Take 1 tablet by mouth 2 (Two) Times a Day.    multivitamin (THERAGRAN) tablet tablet   Yes Yes    Take 1 tablet by mouth Daily.    multivitamin with minerals (HAIR SKIN AND NAILS FORMULA PO)   Yes Yes    Take 1 tablet by mouth Daily.    omeprazole (priLOSEC) 40 MG capsule   No Yes    Take 1 capsule by mouth Daily.            Allergies:  Allergies   Allergen Reactions   • Promethazine Anxiety and Hallucinations     AKA PHENERGAN, Delusions, hallucinations       Objective      Vitals:   Temp:  [98.5 °F (36.9 °C)-98.7 °F (37.1 °C)] 98.7 °F (37.1 °C)  Heart Rate:  [80-94] 94  Resp:  [19-28] 20  BP: (124-149)/(61-72) 124/61    Physical Exam  Vitals and nursing note reviewed.   HENT:      Head: Normocephalic.   Eyes:      Extraocular Movements: Extraocular movements intact.   Cardiovascular:      Rate and Rhythm: Normal rate and regular rhythm.      Heart sounds: Normal heart sounds.   Pulmonary:      Breath sounds: Wheezing and rhonchi present.   Abdominal:      General: Bowel sounds are normal.      Palpations: Abdomen is soft.   Musculoskeletal:         General: Normal range of motion.   Skin:     General: Skin is warm and dry.   Neurological:      Mental Status: She is alert and oriented to person, place, and time.   Psychiatric:         Mood and Affect: Mood normal.          Result Review    Result Review:  I have personally reviewed the results from the time of this admission to 2/15/2023 17:23 EST and agree with these findings:  [x]  Laboratory  [x]  Microbiology  [x]  Radiology  [x]  EKG/Telemetry   []  Cardiology/Vascular   []  Pathology  []  Old records  []  Other:  CBC:      Lab 02/15/23  0941   WBC 18.70*   HEMOGLOBIN 10.9*   HEMATOCRIT 34.4   PLATELETS 376    NEUTROS ABS 15.50*   LYMPHS ABS 0.90   MONOS ABS 2.10*   EOS ABS 0.10   MCV 75.8*     CMP:      Lab 02/15/23  0941   SODIUM 133*   POTASSIUM 3.7   CHLORIDE 100   CO2 21.0*   ANION GAP 12.0   BUN 11   CREATININE 0.70   EGFR 98.5   GLUCOSE 158*   CALCIUM 9.7   TOTAL PROTEIN 7.9   ALBUMIN 3.5   GLOBULIN 4.4   ALT (SGPT) 15   AST (SGOT) 14   BILIRUBIN 0.5   ALK PHOS 121*   XR Chest 1 View    Result Date: 2/15/2023  New left basilar airspace opacities suspicious for pneumonia.  Electronically Signed: Jaylan Amelia  2/15/2023 10:36 AM EST  Workstation ID: [id number]    ECG 12 Lead Chest Pain   Preliminary Result   HEART RATE= 83  bpm   RR Interval= 720  ms   TN Interval= 159  ms   P Horizontal Axis= 1  deg   P Front Axis= 44  deg   QRSD Interval= 105  ms   QT Interval= 349  ms   QRS Axis= 43  deg   T Wave Axis= 20  deg   - NORMAL ECG -   Sinus rhythm   No previous ECG available for comparison   Electronically Signed By:    Date and Time of Study: 2023-02-15 09:30:13          Assessment & Plan        Active Hospital Problems:  Active Hospital Problems    Diagnosis    • **Pneumonia of left lower lobe due to infectious organism      Plan:   Pneumonia of left lower lobe  Pulmicort and DuoNeb ordered  Continue IV ceftriaxone and azithromycin  Waiting on blood cultures  Ordered sputum culture  Ordered urine S.pneumo antigen and Legionella antigen   CT chest pending  Pulmonology consult    Hyperlipidemia  Continue home atorvastatin  Lipid panel from 1/17/2023 reviewed-HDL 39, LDL 79, triglycerides 167, total cholesterol 147    Hypertension  Continue home metoprolol, lisinopril  We will continue to monitor blood pressure    GERD  Continue home Protonix    Seasonal allergies  Continue home Zyrtec    DVT prophylaxis: Lovenox  Medical DVT prophylaxis orders are present.    CODE STATUS:    Level Of Support Discussed With: Patient  Code Status (Patient has no pulse and is not breathing): CPR (Attempt to Resuscitate)  Medical  Interventions (Patient has pulse or is breathing): Full Support    Admission Status:  I believe this patient meets inpatient status.    I discussed the patient's findings and my recommendations with patient.    Signature: Electronically signed by Tania Handy PA-C, 02/15/23, 17:23 EST.  [facility] Hospitalist Team

## 2023-02-16 ENCOUNTER — APPOINTMENT (OUTPATIENT)
Dept: GENERAL RADIOLOGY | Facility: HOSPITAL | Age: 63
End: 2023-02-16
Payer: OTHER GOVERNMENT

## 2023-02-16 LAB
ANION GAP SERPL CALCULATED.3IONS-SCNC: 15 MMOL/L (ref 5–15)
BACTERIA SPEC RESP CULT: NORMAL
BASOPHILS # BLD AUTO: 0.1 10*3/MM3 (ref 0–0.2)
BASOPHILS NFR BLD AUTO: 0.7 % (ref 0–1.5)
BUN SERPL-MCNC: 12 MG/DL (ref 8–23)
BUN/CREAT SERPL: 20.7 (ref 7–25)
CALCIUM SPEC-SCNC: 9.6 MG/DL (ref 8.6–10.5)
CHLORIDE SERPL-SCNC: 102 MMOL/L (ref 98–107)
CO2 SERPL-SCNC: 21 MMOL/L (ref 22–29)
CREAT SERPL-MCNC: 0.58 MG/DL (ref 0.57–1)
D-LACTATE SERPL-SCNC: 1.1 MMOL/L (ref 0.5–2)
DEPRECATED RDW RBC AUTO: 45.9 FL (ref 37–54)
EGFRCR SERPLBLD CKD-EPI 2021: 103.1 ML/MIN/1.73
EOSINOPHIL # BLD AUTO: 0.2 10*3/MM3 (ref 0–0.4)
EOSINOPHIL NFR BLD AUTO: 1.2 % (ref 0.3–6.2)
ERYTHROCYTE [DISTWIDTH] IN BLOOD BY AUTOMATED COUNT: 16.9 % (ref 12.3–15.4)
FERRITIN SERPL-MCNC: 204.5 NG/ML (ref 13–150)
GLUCOSE SERPL-MCNC: 88 MG/DL (ref 65–99)
GRAM STN SPEC: NORMAL
HBA1C MFR BLD: 6.6 % (ref 3.5–5.6)
HCT VFR BLD AUTO: 32.5 % (ref 34–46.6)
HGB BLD-MCNC: 9.9 G/DL (ref 12–15.9)
IRON 24H UR-MRATE: 22 MCG/DL (ref 37–145)
IRON SATN MFR SERPL: 7 % (ref 20–50)
L PNEUMO1 AG UR QL IA: NEGATIVE
LYMPHOCYTES # BLD AUTO: 2.1 10*3/MM3 (ref 0.7–3.1)
LYMPHOCYTES NFR BLD AUTO: 14.9 % (ref 19.6–45.3)
MCH RBC QN AUTO: 23.8 PG (ref 26.6–33)
MCHC RBC AUTO-ENTMCNC: 30.6 G/DL (ref 31.5–35.7)
MCV RBC AUTO: 78 FL (ref 79–97)
MONOCYTES # BLD AUTO: 1.6 10*3/MM3 (ref 0.1–0.9)
MONOCYTES NFR BLD AUTO: 11.8 % (ref 5–12)
NEUTROPHILS NFR BLD AUTO: 10 10*3/MM3 (ref 1.7–7)
NEUTROPHILS NFR BLD AUTO: 71.4 % (ref 42.7–76)
NRBC BLD AUTO-RTO: 0 /100 WBC (ref 0–0.2)
PLATELET # BLD AUTO: 350 10*3/MM3 (ref 140–450)
PMV BLD AUTO: 7.6 FL (ref 6–12)
POTASSIUM SERPL-SCNC: 3.5 MMOL/L (ref 3.5–5.2)
RBC # BLD AUTO: 4.17 10*6/MM3 (ref 3.77–5.28)
S PNEUM AG SPEC QL LA: NEGATIVE
SODIUM SERPL-SCNC: 138 MMOL/L (ref 136–145)
TIBC SERPL-MCNC: 299 MCG/DL (ref 298–536)
TRANSFERRIN SERPL-MCNC: 201 MG/DL (ref 200–360)
TRANSFERRIN SERPL-MCNC: 201 MG/DL (ref 200–360)
WBC NRBC COR # BLD: 14 10*3/MM3 (ref 3.4–10.8)

## 2023-02-16 PROCEDURE — 94799 UNLISTED PULMONARY SVC/PX: CPT

## 2023-02-16 PROCEDURE — 92611 MOTION FLUOROSCOPY/SWALLOW: CPT

## 2023-02-16 PROCEDURE — 83605 ASSAY OF LACTIC ACID: CPT

## 2023-02-16 PROCEDURE — 25010000002 CEFTRIAXONE PER 250 MG

## 2023-02-16 PROCEDURE — G0378 HOSPITAL OBSERVATION PER HR: HCPCS

## 2023-02-16 PROCEDURE — 85025 COMPLETE CBC W/AUTO DIFF WBC: CPT

## 2023-02-16 PROCEDURE — 83036 HEMOGLOBIN GLYCOSYLATED A1C: CPT

## 2023-02-16 PROCEDURE — 83540 ASSAY OF IRON: CPT | Performed by: STUDENT IN AN ORGANIZED HEALTH CARE EDUCATION/TRAINING PROGRAM

## 2023-02-16 PROCEDURE — 82728 ASSAY OF FERRITIN: CPT | Performed by: STUDENT IN AN ORGANIZED HEALTH CARE EDUCATION/TRAINING PROGRAM

## 2023-02-16 PROCEDURE — 80048 BASIC METABOLIC PNL TOTAL CA: CPT

## 2023-02-16 PROCEDURE — 96366 THER/PROPH/DIAG IV INF ADDON: CPT

## 2023-02-16 PROCEDURE — 74230 X-RAY XM SWLNG FUNCJ C+: CPT

## 2023-02-16 PROCEDURE — 84466 ASSAY OF TRANSFERRIN: CPT | Performed by: STUDENT IN AN ORGANIZED HEALTH CARE EDUCATION/TRAINING PROGRAM

## 2023-02-16 PROCEDURE — 25010000002 ENOXAPARIN PER 10 MG: Performed by: INTERNAL MEDICINE

## 2023-02-16 PROCEDURE — 92610 EVALUATE SWALLOWING FUNCTION: CPT

## 2023-02-16 PROCEDURE — 96372 THER/PROPH/DIAG INJ SC/IM: CPT

## 2023-02-16 PROCEDURE — 87449 NOS EACH ORGANISM AG IA: CPT

## 2023-02-16 PROCEDURE — 87899 AGENT NOS ASSAY W/OPTIC: CPT

## 2023-02-16 PROCEDURE — 87205 SMEAR GRAM STAIN: CPT

## 2023-02-16 RX ORDER — BENZONATATE 100 MG/1
200 CAPSULE ORAL 3 TIMES DAILY PRN
Status: DISCONTINUED | OUTPATIENT
Start: 2023-02-16 | End: 2023-02-17 | Stop reason: HOSPADM

## 2023-02-16 RX ADMIN — IPRATROPIUM BROMIDE AND ALBUTEROL SULFATE 3 ML: 2.5; .5 SOLUTION RESPIRATORY (INHALATION) at 18:36

## 2023-02-16 RX ADMIN — MELATONIN TAB 5 MG 5 MG: 5 TAB at 23:28

## 2023-02-16 RX ADMIN — BARIUM SULFATE 1 TEASPOON(S): 0.6 CREAM ORAL at 15:50

## 2023-02-16 RX ADMIN — LISINOPRIL 40 MG: 20 TABLET ORAL at 09:10

## 2023-02-16 RX ADMIN — PANTOPRAZOLE SODIUM 40 MG: 40 TABLET, DELAYED RELEASE ORAL at 06:02

## 2023-02-16 RX ADMIN — ENOXAPARIN SODIUM 40 MG: 100 INJECTION SUBCUTANEOUS at 21:17

## 2023-02-16 RX ADMIN — IPRATROPIUM BROMIDE AND ALBUTEROL SULFATE 3 ML: 2.5; .5 SOLUTION RESPIRATORY (INHALATION) at 15:21

## 2023-02-16 RX ADMIN — ENOXAPARIN SODIUM 40 MG: 100 INJECTION SUBCUTANEOUS at 09:11

## 2023-02-16 RX ADMIN — METOPROLOL TARTRATE 50 MG: 50 TABLET, FILM COATED ORAL at 09:11

## 2023-02-16 RX ADMIN — METOPROLOL TARTRATE 50 MG: 50 TABLET, FILM COATED ORAL at 21:17

## 2023-02-16 RX ADMIN — IPRATROPIUM BROMIDE AND ALBUTEROL SULFATE 3 ML: 2.5; .5 SOLUTION RESPIRATORY (INHALATION) at 08:16

## 2023-02-16 RX ADMIN — BUDESONIDE 0.5 MG: 0.5 INHALANT RESPIRATORY (INHALATION) at 18:36

## 2023-02-16 RX ADMIN — Medication 10 ML: at 09:11

## 2023-02-16 RX ADMIN — BUDESONIDE 0.5 MG: 0.5 INHALANT RESPIRATORY (INHALATION) at 08:20

## 2023-02-16 RX ADMIN — CETIRIZINE HYDROCHLORIDE 10 MG: 10 TABLET, FILM COATED ORAL at 09:11

## 2023-02-16 RX ADMIN — BENZONATATE 200 MG: 100 CAPSULE ORAL at 23:27

## 2023-02-16 RX ADMIN — ATORVASTATIN CALCIUM 40 MG: 40 TABLET, FILM COATED ORAL at 21:17

## 2023-02-16 RX ADMIN — BARIUM SULFATE 50 ML: 400 SUSPENSION ORAL at 14:11

## 2023-02-16 RX ADMIN — Medication 10 ML: at 21:17

## 2023-02-16 RX ADMIN — CEFTRIAXONE 2 G: 2 INJECTION, POWDER, FOR SOLUTION INTRAMUSCULAR; INTRAVENOUS at 13:18

## 2023-02-16 NOTE — CASE MANAGEMENT/SOCIAL WORK
Discharge Planning Assessment  [facility]     Patient Name: Eve Garrett  MRN: [id number]  Today's Date: 2/16/2023    Admit Date: 2/15/2023    Plan: D/C plan: Anticipate home   Discharge Needs Assessment     Row Name 02/16/23 1624       Living Environment    People in Home spouse    Name(s) of People in Home  Adebayo    Current Living Arrangements home    Primary Care Provided by self    Provides Primary Care For no one    Family Caregiver if Needed spouse    Family Caregiver Names Adebayo    Quality of Family Relationships helpful;involved;supportive    Able to Return to Prior Arrangements yes       Resource/Environmental Concerns    Resource/Environmental Concerns none    Transportation Concerns none       Transition Planning    Patient/Family Anticipates Transition to home with family    Patient/Family Anticipated Services at Transition none    Transportation Anticipated family or friend will provide       Discharge Needs Assessment    Readmission Within the Last 30 Days no previous admission in last 30 days    Equipment Currently Used at Home none    Concerns to be Addressed discharge planning    Anticipated Changes Related to Illness none    Equipment Needed After Discharge none    Provided Post Acute Provider List? N/A    N/A Provider List Comment Anticipate home               Discharge Plan     Row Name 02/16/23 1621       Plan    Plan D/C plan: Anticipate home    Patient/Family in Agreement with Plan yes    Plan Comments Met with pt at bedside. Pt lives at home with  Adebayo. Pt is IADL, including driving. Denies use of DME at home. PCP and pharmacy confirmed. No financial barriers to meds. No use of HHC. Adebayo to transport at d/c.                    Demographic Summary     Row Name 02/16/23 1625       General Information    Admission Type observation    Arrived From emergency department    Referral Source admission list    Reason for Consult discharge planning    Preferred Language English                Functional Status     Row Name 02/16/23 1624       Functional Status    Usual Activity Tolerance good    Current Activity Tolerance good       Functional Status, IADL    Medications independent    Meal Preparation independent    Housekeeping independent    Laundry independent    Shopping independent                     Met with patient in room wearing PPE: mask  Maintained distance greater than six feet and spent less than 15 minutes in the room.        Jose Alicia RN

## 2023-02-16 NOTE — PLAN OF CARE
Problem: Adult Inpatient Plan of Care  Goal: Plan of Care Review  Outcome: Ongoing, Progressing  Flowsheets (Taken 2/16/2023 0456)  Progress: no change  Plan of Care Reviewed With: patient  Goal: Patient-Specific Goal (Individualized)  Outcome: Ongoing, Progressing  Goal: Absence of Hospital-Acquired Illness or Injury  Outcome: Ongoing, Progressing  Intervention: Identify and Manage Fall Risk  Recent Flowsheet Documentation  Taken 2/16/2023 0400 by Alvaro Quinn RN  Safety Promotion/Fall Prevention: safety round/check completed  Taken 2/16/2023 0200 by Alvaro Quinn RN  Safety Promotion/Fall Prevention: safety round/check completed  Taken 2/16/2023 0000 by Alvaro Quinn RN  Safety Promotion/Fall Prevention:   nonskid shoes/slippers when out of bed   safety round/check completed  Taken 2/15/2023 2200 by Alvaro Quinn RN  Safety Promotion/Fall Prevention: safety round/check completed  Taken 2/15/2023 2000 by Alvaro Quinn RN  Safety Promotion/Fall Prevention: safety round/check completed  Intervention: Prevent Skin Injury  Recent Flowsheet Documentation  Taken 2/15/2023 2000 by Alvaro Quinn RN  Body Position: position changed independently  Skin Protection: adhesive use limited  Intervention: Prevent and Manage VTE (Venous Thromboembolism) Risk  Recent Flowsheet Documentation  Taken 2/15/2023 2000 by Alvaro Quinn RN  Activity Management: activity adjusted per tolerance  VTE Prevention/Management: sequential compression devices off  Range of Motion: active ROM (range of motion) encouraged  Intervention: Prevent Infection  Recent Flowsheet Documentation  Taken 2/15/2023 2000 by Alvaro Quinn RN  Infection Prevention: single patient room provided  Goal: Optimal Comfort and Wellbeing  Outcome: Ongoing, Progressing  Intervention: Monitor Pain and Promote Comfort  Recent Flowsheet Documentation  Taken 2/16/2023 0400 by Alvaro Quinn RN  Pain Management Interventions: see MAR  Taken 2/16/2023  0000 by Alvaro Quinn, RN  Pain Management Interventions: see MAR  Taken 2/15/2023 2000 by Alvaro Quinn RN  Pain Management Interventions:   see MAR   quiet environment facilitated  Intervention: Provide Person-Centered Care  Recent Flowsheet Documentation  Taken 2/15/2023 2000 by Alvaro Quinn, RN  Trust Relationship/Rapport:   care explained   choices provided   emotional support provided   questions answered   questions encouraged   empathic listening provided   reassurance provided   thoughts/feelings acknowledg  Goal: Readiness for Transition of Care  Outcome: Ongoing, Progressing  Intervention: Mutually Develop Transition Plan  Recent Flowsheet Documentation  Taken 2/15/2023 1920 by Alvaro Quinn, RN  Transportation Anticipated: family or friend will provide  Patient/Family Anticipated Services at Transition: none  Patient/Family Anticipates Transition to: home with family  Taken 2/15/2023 1913 by Alvaro Quinn, RN  Equipment Currently Used at Home: none   Goal Outcome Evaluation:  Plan of Care Reviewed With: patient   Patient reports not sleeping well due the coughing. Sputum and clean catch performed. No new complaints.      Progress: no change

## 2023-02-16 NOTE — CONSULTS
PULMONARY CRITICAL CARE CONSULT NOTE      PATIENT IDENTIFICATION:  Name: Eve Garrett  MRN: [id number]  :  1960     Age: 62 y.o.  Sex: female        DATE OF CONSULTATION:  2023  PRIMARY CARE PHYSICIAN    Nunu Kingston MD                  CHIEF COMPLAINT: SOB     History of Present Illness:   Eve Garrett is a 62 y.o. female with a history of Asthma, RASHEEDA, GERD, hypertension, hyperlipidemia, osteoarthritis, chronic constipation, hypothyroidism, and history of diverticulitis who came to the ER with complaints of cough and shortness of breath for the last 3 months.  She further states she said 3 or 4 rounds of antibiotics outpatient with no improvement of her symptoms.  She is currently on a amoxicillin since Saturday and denies any improvement in her symptoms.  In the ER she was noted with pneumonia left lower lobe and admitted for further treatment.      Review of Systems:   Constitutional:  Is above   Eyes: negative   ENT/oropharynx: negative   Cardiovascular: negative   Respiratory:  Is well   Gastrointestinal: negative   Genitourinary: negative   Neurological: negative   Musculoskeletal: negative   Integument/breast: negative   Endocrine: negative   Allergic/Immunologic: negative     Past Medical History:  Past Medical History:   Diagnosis Date   • Allergic rhinitis    • Anemia 2020   • Asthma 2011   • Calcaneal spur of foot, right 2021    XRAY AT [facility]   • Cancer (HCC)     Appendcele mucous neoplasm   • Constipation 2012   • Contact dermatitis 2015   • Depression 2013   • Disease of thyroid gland     HYPOTHYROIDISM   • Diverticulitis 2012   • Dyspnea 2020    SEEN AT [facility] ER   • Gastroesophageal reflux disease 2013   • Hyperlipidemia    • Hypertension    • MRSA infection 2020   • Myalgia and myositis 2011   • Osteoarthritis 10/30/2014   • Ovarian cyst 2013   • Ovarian enlargement, left    •  Plantar fasciitis, right 02/20/2021    SEEN AT [facility] ER   • Seasonal allergies    • Sleep apnea 05/21/2021    NO CPAP       Past Surgical History:  Past Surgical History:   Procedure Laterality Date   • APPENDECTOMY N/A 03/20/2020    LAPAROSCOPIC, DR. WILLIAM CHEADLE AT [place]   • CARDIAC CATHETERIZATION Left 10/02/2009    No Intervention   • CHOLECYSTECTOMY N/A 02/08/2005    DR. CHRIS LOZA AT [facility]   • COLON RESECTION LEFT Left 2010    D/T RUPTURED DIVERTICULA, DR. DEA CROWE   • COLON RESECTION SMALL BOWEL N/A 04/07/2020    LAPAROSCOPY, MOBILIZATION OF RIGHT COLON, CONVERSION TO OPEN WITH RIGHT TRANSVERSE INCISION, EXTENSIVE LYSIS OF ADHESIONS, AND ILEOCECTOMY, DR. WILLIAM CHEADLE AT [place]   • COLONOSCOPY N/A 03/2012    WITH ILEOSCOPY   • COLONOSCOPY N/A 11/09/2020    A FEW DIVERTICULA IN DESCENDING AND SIGMOID, MILD INTERNAL HEMORRHOIDS, RLQ ANASTAMOSIS, BX: REACTIVE ILEAL MUCOSA WITH MILD CHRONIC ILEITIS, RESCOPE IN 3 YRS, DR. AUSTEN NÚÑEZ AT [facility]   • COLONOSCOPY W/ POLYPECTOMY N/A 02/24/2017    BULBUOUS APPENDIX, POLYPS   • DIAGNOSTIC LAPAROSCOPY N/A 1993    FOR ENDOMETRIOSIS   • DIAGNOSTIC LAPAROSCOPY N/A 1992    FOR ENDOMETRIOSIS   • ENDOSCOPY AND COLONOSCOPY N/A 2009   • ERCP WITH SPHINCTEROTOMY/PAPILLOTOMY N/A 2005    WITH STENT   • FOOT SURGERY Right 2003   • HEMICOLECTOMY Left 06/13/2012    LAPAROSCOPIC LEFT AND SIGMOID HEMICOLECTOMY WITH TRANSVERSE RECTAL ANASTAMOSIS AND LAPAROSCOPIC TAKEDOWN OF SPLENIC FLEXURE, D/T DIVERTICULAR PERFORATION WITH ABSCESS, DR. ABY MCFARLAND AT [facility]   • HYSTERECTOMY N/A 1992    ROZINA WITH BSO        Family History:  Family History   Problem Relation Age of Onset   • Cancer Mother    • Liver cancer Mother    • Basal cell carcinoma Mother    • Diabetes Mother    • Cancer Father    • Lung cancer Father    • Hypertension Father         And also my mother had as well as my self   • Celiac disease Father    • Heart disease Father    •  Diverticulitis Sister    • Hypertension Sister    • Cancer Brother         BLADDER CANCER   • Cancer Brother    • Stomach cancer Brother    • Cancer Maternal Aunt    • Ovarian cancer Maternal Aunt    • Cancer Paternal Aunt    • Breast cancer Paternal Aunt    • Cancer Paternal Uncle    • Stomach cancer Paternal Uncle    • Heart disease Maternal Grandmother    • Heart disease Paternal Grandmother    • Heart failure Paternal Grandmother         Social History:   Social History     Tobacco Use   • Smoking status: Never   • Smokeless tobacco: Never   • Tobacco comments:     My father was a heavy smoker, and my brothers all smoke   Substance Use Topics   • Alcohol use: Yes     Alcohol/week: 3.0 standard drinks     Types: 1 Glasses of wine, 1 Cans of beer, 1 Shots of liquor per week     Comment: rarely        Allergies:  Allergies   Allergen Reactions   • Promethazine Anxiety and Hallucinations     AKA PHENERGAN, Delusions, hallucinations       Home Meds:  Medications Prior to Admission   Medication Sig Dispense Refill Last Dose   • albuterol sulfate  (90 Base) MCG/ACT inhaler Inhale 2 puffs Every 4 (Four) Hours As Needed for Wheezing. 3 g 3 2/14/2023   • amoxicillin (AMOXIL) 875 MG tablet Take 1 tablet by mouth 2 (Two) Times a Day for 10 days. 20 tablet 0 2/15/2023   • atorvastatin (LIPITOR) 20 MG tablet Take 2 tablets by mouth Every Night. 90 tablet 3 2/14/2023   • cetirizine (zyrTEC) 10 MG tablet Take 10 mg by mouth Daily.   Past Week   • fluticasone (FLONASE) 50 MCG/ACT nasal spray 2 sprays into the nostril(s) as directed by provider Daily. 3 g 3 2/14/2023   • Fluticasone-Umeclidin-Vilant (Trelegy Ellipta) 100-62.5-25 MCG/ACT inhaler Inhale 1 puff Daily. 3 each 3 Past Week   • lisinopril (PRINIVIL,ZESTRIL) 40 MG tablet Take 1 tablet by mouth Daily. 90 tablet 3    • metoprolol tartrate (LOPRESSOR) 50 MG tablet Take 1 tablet by mouth 2 (Two) Times a Day. 180 tablet 3 2/15/2023   • multivitamin (THERAGRAN) tablet  "tablet Take 1 tablet by mouth Daily.   2/15/2023   • multivitamin with minerals tablet tablet Take 1 tablet by mouth Daily.   2/15/2023   • omeprazole (priLOSEC) 40 MG capsule Take 1 capsule by mouth Daily. 90 capsule 1 2/15/2023       Objective:  tMax 24 hrs: Temp (24hrs), Av °F (37.2 °C), Min:98.7 °F (37.1 °C), Max:99.5 °F (37.5 °C)      Vitals Ranges:   Temp:  [98.7 °F (37.1 °C)-99.5 °F (37.5 °C)] 99.5 °F (37.5 °C)  Heart Rate:  [80-95] 90  Resp:  [12-24] 16  BP: (124-152)/(58-76) 152/76    Intake and Output Last 3 Shifts:   I/O last 3 completed shifts:  In: 590 [P.O.:240; IV Piggyback:350]  Out: -     Exam:  /76 (BP Location: Right arm, Patient Position: Lying)   Pulse 90   Temp 99.5 °F (37.5 °C) (Oral)   Resp 16   Ht 167.6 cm (66\")   Wt 114 kg (250 lb 10.6 oz)   SpO2 98%   BMI 40.46 kg/m²       02/15/23  0925 02/15/23  1729   Weight: 114 kg (250 lb 10.6 oz) 114 kg (250 lb 10.6 oz)     General Appearance: Alert     HEENT:  Normocephalic, without obvious abnormality, atraumatic. Conjunctivae/corneas clear.  Normal external ear canals. Nares normal, no drainage.  Neck:  Supple, symmetrical, trachea midline. No JVD.  Lungs /Chest wall:   Bilateral basal rhonchi, respirations unlabored, symmetrical wall movement.     Heart:  Regular rate and rhythm, systolic murmur PMI left sternal border  Abdomen: Soft, nontender, no masses, no organomegaly.    Extremities: Trace edema, no clubbing or cyanosis        Data Review:  All labs (24hrs):   Recent Results (from the past 24 hour(s))   S. Pneumo Ag Urine or CSF - Urine, Urine, Clean Catch    Collection Time: 23  4:01 AM    Specimen: Urine, Clean Catch   Result Value Ref Range    Strep Pneumo Ag Negative Negative   Legionella Antigen, Urine - Urine, Urine, Clean Catch    Collection Time: 23  4:01 AM    Specimen: Urine, Clean Catch   Result Value Ref Range    LEGIONELLA ANTIGEN, URINE Negative Negative   Respiratory Culture - Sputum, Cough    " Collection Time: 02/16/23  4:02 AM    Specimen: Cough; Sputum   Result Value Ref Range    Respiratory Culture Rejected     Gram Stain Many (4+) Epithelial cells per low power field     Gram Stain       Many (4+) Mixed bacterial morphotypes seen on Gram Stain    Gram Stain Few (2+) WBCs per low power field    Lactic Acid, Plasma    Collection Time: 02/16/23  4:19 AM    Specimen: Hand, Right; Blood   Result Value Ref Range    Lactate 1.1 0.5 - 2.0 mmol/L   Basic Metabolic Panel    Collection Time: 02/16/23  4:19 AM    Specimen: Hand, Right; Blood   Result Value Ref Range    Glucose 88 65 - 99 mg/dL    BUN 12 8 - 23 mg/dL    Creatinine 0.58 0.57 - 1.00 mg/dL    Sodium 138 136 - 145 mmol/L    Potassium 3.5 3.5 - 5.2 mmol/L    Chloride 102 98 - 107 mmol/L    CO2 21.0 (L) 22.0 - 29.0 mmol/L    Calcium 9.6 8.6 - 10.5 mg/dL    BUN/Creatinine Ratio 20.7 7.0 - 25.0    Anion Gap 15.0 5.0 - 15.0 mmol/L    eGFR 103.1 >60.0 mL/min/1.73   Hemoglobin A1c    Collection Time: 02/16/23  4:19 AM    Specimen: Hand, Right; Blood   Result Value Ref Range    Hemoglobin A1C 6.6 (H) 3.5 - 5.6 %   CBC Auto Differential    Collection Time: 02/16/23  4:19 AM    Specimen: Hand, Right; Blood   Result Value Ref Range    WBC 14.00 (H) 3.40 - 10.80 10*3/mm3    RBC 4.17 3.77 - 5.28 10*6/mm3    Hemoglobin 9.9 (L) 12.0 - 15.9 g/dL    Hematocrit 32.5 (L) 34.0 - 46.6 %    MCV 78.0 (L) 79.0 - 97.0 fL    MCH 23.8 (L) 26.6 - 33.0 pg    MCHC 30.6 (L) 31.5 - 35.7 g/dL    RDW 16.9 (H) 12.3 - 15.4 %    RDW-SD 45.9 37.0 - 54.0 fl    MPV 7.6 6.0 - 12.0 fL    Platelets 350 140 - 450 10*3/mm3    Neutrophil % 71.4 42.7 - 76.0 %    Lymphocyte % 14.9 (L) 19.6 - 45.3 %    Monocyte % 11.8 5.0 - 12.0 %    Eosinophil % 1.2 0.3 - 6.2 %    Basophil % 0.7 0.0 - 1.5 %    Neutrophils, Absolute 10.00 (H) 1.70 - 7.00 10*3/mm3    Lymphocytes, Absolute 2.10 0.70 - 3.10 10*3/mm3    Monocytes, Absolute 1.60 (H) 0.10 - 0.90 10*3/mm3    Eosinophils, Absolute 0.20 0.00 - 0.40 10*3/mm3     Basophils, Absolute 0.10 0.00 - 0.20 10*3/mm3    nRBC 0.0 0.0 - 0.2 /100 WBC   Iron Profile    Collection Time: 02/16/23  4:19 AM    Specimen: Hand, Right; Blood   Result Value Ref Range    Iron 22 (L) 37 - 145 mcg/dL    Iron Saturation 7 (L) 20 - 50 %    Transferrin 201 200 - 360 mg/dL    TIBC 299 298 - 536 mcg/dL   Ferritin    Collection Time: 02/16/23  4:19 AM    Specimen: Hand, Right; Blood   Result Value Ref Range    Ferritin 204.50 (H) 13.00 - 150.00 ng/mL   Transferrin    Collection Time: 02/16/23  4:19 AM    Specimen: Hand, Right; Blood   Result Value Ref Range    Transferrin 201 200 - 360 mg/dL        Imaging:  CT Chest Without Contrast Diagnostic    Result Date: 2/15/2023  CT CHEST WO CONTRAST DIAGNOSTIC Date of Exam: 2/15/2023 4:18 PM EST Indication: Pneumonia, complication suspected, xray done. Comparison: 4/19/2022. Technique: Axial CT images were obtained of the chest without contrast administration.  Sagittal and coronal reconstructions were performed.  Automated exposure control and iterative reconstruction methods were used. Findings: There is patchy dependent bibasilar pneumonia. Airways appear patent. There is minor right middle lobe pneumonia there is minor bilateral apical pneumonia. No pneumothorax, pleural effusion or lobar consolidation. The thyroid, trachea and esophagus appear within normal limits. Heart size is normal. No pericardial effusion or mediastinal lymphadenopathy. There are right hilar calcified granulomas. Pericardium is unremarkable. Superficial soft tissues appear unremarkable. No acute findings in the upper abdomen. There is pneumobilia, likely related to prior sphincterotomy or incompetent sphincter. Correlate with history. There are no acute osseous abnormalities or destructive bone lesions. There are mild diffuse thoracic degenerative changes.     Impression: Multifocal pneumonia with primary foci of pneumonia in the dependent lower lobes. Electronically Signed: Ye  Annie  2/15/2023 7:44 PM EST  Workstation ID: [id number]    XR Chest 1 View    Result Date: 2/15/2023  XR CHEST 1 VW Date of Exam: 2/15/2023 10:16 AM EST Indication: lateral chest wall pain.  Cough Comparison: December 27, 2022 FINDINGS: There are new left basilar airspace opacities. No significant right lung infiltrate.  No pneumothorax or significant pleural effusion.  Heart size and mediastinal contour appear within normal limits.  No definite osseous abnormality is seen on this limited single view.     New left basilar airspace opacities suspicious for pneumonia.  Electronically Signed: Jaylan Amelia  2/15/2023 10:36 AM EST  Workstation ID: [id number]       Current:  atorvastatin, 40 mg, Oral, Nightly  budesonide, 1 mg, Nebulization, BID - RT  cefTRIAXone, 2 g, Intravenous, Q24H  cetirizine, 10 mg, Oral, Daily  enoxaparin, 40 mg, Subcutaneous, Q12H  ipratropium-albuterol, 3 mL, Nebulization, 4x Daily - RT  lisinopril, 40 mg, Oral, Daily  metoprolol tartrate, 50 mg, Oral, BID  pantoprazole, 40 mg, Oral, Q AM  sodium chloride, 10 mL, Intravenous, Q12H        Infusion:  Pharmacy to Dose enoxaparin (LOVENOX),          PRN:  •  acetaminophen  •  benzonatate  •  melatonin  •  nitroglycerin  •  ondansetron  •  Pharmacy to Dose enoxaparin (LOVENOX)  •  sodium chloride  •  sodium chloride  •  sodium chloride    ASSESSMENT:  Acute hypoxic respiratory failure  Pneumonia  RASHEEDA  Asthma  Chronic constipation  Depression  Hypothyroidism  Diverticulitis  GERD  Hyperlipidemia  Hypertension  Osteoarthritis           PLAN:  Antibiotics  Bronchodilators  Inhaled corticosteroids  Incentive spirometer  Will need OP workup for her RASHEEDA   Electrolytes/ glycemic control  DVT and GI prophylaxis-Lovenox    Discussed with Dr Dori Wood, APRN    2/16/2023  12:31 EST      I personally have examined  and interviewed the patient. I have reviewed the history, data, problems, assessment and plan with our NP.  Total Critical care time  in direct medical management (   ) minutes, This time specifically excludes time spent performing procedures.    Nivia Meadows MD   2/16/2023  22:30 EST

## 2023-02-16 NOTE — PAYOR COMM NOTE
"    OBSERVATION AUTHORIZATION REQUEST    02/15/23 1359  Initiate Observation Status            Franchesca CORONA, RN    Care Coordination   Utilization Review  [facility]  [address] [place] [address] [phone] office  [phone] fax  [email or web address]  [email or web address]        Katy Garrett (62 y.o. Female)     Date of Birth   1960    Social Security Number       [place]   [address] [place] [address]    Home Phone   [phone]    MRN   [id number]       Yarsanism   Hinduism    Marital Status                               Admission Date   2/15/23    Admission Type   Emergency    Admitting Provider   Anne Rogers MD    Attending Provider   Anne Rogers MD    Department, Room/Bed   [facility] 3A MEDICAL INPATIENT, 305/1       Discharge Date       Discharge Disposition       Discharge Destination                               Attending Provider: Anne Rogers MD    Allergies: Promethazine    Isolation: None   Infection: MRSA/History Only (02/15/23)   Code Status: CPR    Ht: 167.6 cm (66\")   Wt: 114 kg (250 lb 10.6 oz)    Admission Cmt: None   Principal Problem: Pneumonia of left lower lobe due to infectious organism [J18.9]                 Active Insurance as of 2/15/2023     Primary Coverage     Payor Plan Insurance Group Employer/Plan Group    [facility]      Payor Plan Address Payor Plan Phone Number Payor Plan Fax Number Effective Dates    [address] [phone]  1/1/2023 - None Entered    [facility] [id number]       Subscriber Name Subscriber Birth Date Member ID       JOON GARRETTDEMETRICE RONQUILLO 1960 [id number]                 Emergency Contacts      (Rel.) Home Phone Work Phone Mobile Phone    DARRON GARRETT (Spouse) -- -- [phone]    Naresh Leaisten (Daughter) -- -- [phone]               History & Physical      Tania Handy PA-C at 02/15/23 1722              [facility] Medicine " Services  History & Physical    Patient Name: Eve Garrett  : 1960  MRN: [id number]  Primary Care Physician:  Nunu Kingston MD  Date of admission: 2/15/2023  Date and Time of Service: 2/15/2023    Subjective       Chief Complaint: Cough and shortness of air    History of Present Illness: Eve Garrett is a 62 y.o. female with past medical history of asthma, GERD, hypertension, hyperlipidemia, sleep apnea who presented to [facility] on 2/15/2023 complaining of productive cough, and shortness of air.  She states she has been feeling sick with a cough since 2022.  She states that she has received 3-4 rounds of antibiotics outpatient without much improvement of her symptoms.  She is currently been on amoxicillin since Saturday and denies any improvement of her symptoms.  She states that over the course of the past 4 months her cough has been productive and the sputum has progressed from a pale green color to light pink to green and gray.  She states she has not had an appetite for the past week and complains of nausea, vomiting, and diarrhea.  Patient also has sleep apnea but she reports she has not been on her CPAP machine because it is broken.  She is not on home oxygen currently.  She also has been experiencing occasional night sweats, fevers and chills. she denies any hemoptysis or hematochezia.    Work-up in the ED, troponin was 9 trended down to 8, glucose 158, sodium 133, potassium 3.7, creatinine 0.7, BUN 11, EGFR 98.5, ALT 15, AST 14, WBC 18.7, hemoglobin 10.9, blood cultures pending, respiratory panel negative, chest x-ray shows new left basilar airspace opacities suspicious for pneumonia.  EKG showed sinus rhythm.      Review of Systems   Constitutional: Positive for chills, fever and night sweats.   Respiratory: Positive for cough, shortness of breath and sputum production.    Gastrointestinal: Positive for diarrhea, nausea and vomiting.   All other systems reviewed  and are negative.      Personal History     Past Medical History:   Diagnosis Date   • Allergic rhinitis    • Anemia 04/2020   • Asthma 11/04/2011   • Calcaneal spur of foot, right 02/20/2021    XRAY AT [facility]   • Cancer (HCC)     Appendcele mucous neoplasm   • Constipation 07/11/2012   • Contact dermatitis 07/16/2015   • Depression 05/28/2013   • Disease of thyroid gland     HYPOTHYROIDISM   • Diverticulitis 02/01/2012   • Dyspnea 07/13/2020    SEEN AT [facility] ER   • Gastroesophageal reflux disease 05/28/2013   • Hyperlipidemia    • Hypertension    • MRSA infection 04/25/2020   • Myalgia and myositis 09/13/2011   • Osteoarthritis 10/30/2014   • Ovarian cyst 04/23/2013   • Ovarian enlargement, left    • Plantar fasciitis, right 02/20/2021    SEEN AT [facility]   • Seasonal allergies    • Sleep apnea 05/21/2021    NO CPAP       Past Surgical History:   Procedure Laterality Date   • APPENDECTOMY N/A 03/20/2020    LAPAROSCOPIC, DR. WILLIAM CHEADLE AT [place]   • CARDIAC CATHETERIZATION Left 10/02/2009    No Intervention   • CHOLECYSTECTOMY N/A 02/08/2005    DR. CHRIS LOZA AT [facility]   • COLON RESECTION LEFT Left 2010    D/T RUPTURED DIVERTICULA, DR. DEA CROWE   • COLON RESECTION SMALL BOWEL N/A 04/07/2020    LAPAROSCOPY, MOBILIZATION OF RIGHT COLON, CONVERSION TO OPEN WITH RIGHT TRANSVERSE INCISION, EXTENSIVE LYSIS OF ADHESIONS, AND ILEOCECTOMY, DR. WILLIAM CHEADLE AT [place]   • COLONOSCOPY N/A 03/2012    WITH ILEOSCOPY   • COLONOSCOPY N/A 11/09/2020    A FEW DIVERTICULA IN DESCENDING AND SIGMOID, MILD INTERNAL HEMORRHOIDS, RLQ ANASTAMOSIS, BX: REACTIVE ILEAL MUCOSA WITH MILD CHRONIC ILEITIS, RESCOPE IN 3 YRS, DR. AUSTEN NÚÑEZ AT [facility]   • COLONOSCOPY W/ POLYPECTOMY N/A 02/24/2017    BULBUOUS APPENDIX, POLYPS   • DIAGNOSTIC LAPAROSCOPY N/A 1993    FOR ENDOMETRIOSIS   • DIAGNOSTIC LAPAROSCOPY N/A 1992    FOR ENDOMETRIOSIS   • ENDOSCOPY AND COLONOSCOPY N/A 2009    • ERCP WITH SPHINCTEROTOMY/PAPILLOTOMY N/A 2005    WITH STENT   • FOOT SURGERY Right 2003   • HEMICOLECTOMY Left 06/13/2012    LAPAROSCOPIC LEFT AND SIGMOID HEMICOLECTOMY WITH TRANSVERSE RECTAL ANASTAMOSIS AND LAPAROSCOPIC TAKEDOWN OF SPLENIC FLEXURE, D/T DIVERTICULAR PERFORATION WITH ABSCESS, DR. ABY MCFARLAND AT [facility]   • HYSTERECTOMY N/A 1992    ROZINA WITH BSO       Family History: family history includes Basal cell carcinoma in her mother; Breast cancer in her paternal aunt; Cancer in her brother, brother, father, maternal aunt, mother, paternal aunt, and paternal uncle; Celiac disease in her father; Diabetes in her mother; Diverticulitis in her sister; Heart disease in her father, maternal grandmother, and paternal grandmother; Heart failure in her paternal grandmother; Hypertension in her father and sister; Liver cancer in her mother; Lung cancer in her father; Ovarian cancer in her maternal aunt; Stomach cancer in her brother and paternal uncle. Otherwise pertinent FHx was reviewed and not pertinent to current issue.    Social History:  reports that she has never smoked. She has never used smokeless tobacco. She reports current alcohol use of about 3.0 standard drinks per week. She reports that she does not use drugs.    Home Medications:  Prior to Admission Medications     Prescriptions Last Dose Informant Patient Reported? Taking?    albuterol sulfate  (90 Base) MCG/ACT inhaler   No Yes    Inhale 2 puffs Every 4 (Four) Hours As Needed for Wheezing.    amoxicillin (AMOXIL) 875 MG tablet   No Yes    Take 1 tablet by mouth 2 (Two) Times a Day for 10 days.    atorvastatin (LIPITOR) 20 MG tablet   No Yes    Take 2 tablets by mouth Every Night.    cetirizine (zyrTEC) 10 MG tablet   Yes Yes    Take 10 mg by mouth Daily.    fluticasone (FLONASE) 50 MCG/ACT nasal spray   No Yes    2 sprays into the nostril(s) as directed by provider Daily.    Fluticasone-Umeclidin-Vilant (Trelegy Ellipta) 100-62.5-25  MCG/ACT inhaler   No Yes    Inhale 1 puff Daily.    lisinopril (PRINIVIL,ZESTRIL) 40 MG tablet   No Yes    Take 1 tablet by mouth Daily.    metoprolol tartrate (LOPRESSOR) 50 MG tablet   No Yes    Take 1 tablet by mouth 2 (Two) Times a Day.    multivitamin (THERAGRAN) tablet tablet   Yes Yes    Take 1 tablet by mouth Daily.    multivitamin with minerals (HAIR SKIN AND NAILS FORMULA PO)   Yes Yes    Take 1 tablet by mouth Daily.    omeprazole (priLOSEC) 40 MG capsule   No Yes    Take 1 capsule by mouth Daily.            Allergies:  Allergies   Allergen Reactions   • Promethazine Anxiety and Hallucinations     AKA PHENERGAN, Delusions, hallucinations       Objective       Vitals:   Temp:  [98.5 °F (36.9 °C)-98.7 °F (37.1 °C)] 98.7 °F (37.1 °C)  Heart Rate:  [80-94] 94  Resp:  [19-28] 20  BP: (124-149)/(61-72) 124/61    Physical Exam  Vitals and nursing note reviewed.   HENT:      Head: Normocephalic.   Eyes:      Extraocular Movements: Extraocular movements intact.   Cardiovascular:      Rate and Rhythm: Normal rate and regular rhythm.      Heart sounds: Normal heart sounds.   Pulmonary:      Breath sounds: Wheezing and rhonchi present.   Abdominal:      General: Bowel sounds are normal.      Palpations: Abdomen is soft.   Musculoskeletal:         General: Normal range of motion.   Skin:     General: Skin is warm and dry.   Neurological:      Mental Status: She is alert and oriented to person, place, and time.   Psychiatric:         Mood and Affect: Mood normal.          Result Review    Result Review:  I have personally reviewed the results from the time of this admission to 2/15/2023 17:23 EST and agree with these findings:  [x]  Laboratory  [x]  Microbiology  [x]  Radiology  [x]  EKG/Telemetry   []  Cardiology/Vascular   []  Pathology  []  Old records  []  Other:  CBC:      Lab 02/15/23  0941   WBC 18.70*   HEMOGLOBIN 10.9*   HEMATOCRIT 34.4   PLATELETS 376   NEUTROS ABS 15.50*   LYMPHS ABS 0.90   MONOS ABS 2.10*    EOS ABS 0.10   MCV 75.8*     CMP:      Lab 02/15/23  0941   SODIUM 133*   POTASSIUM 3.7   CHLORIDE 100   CO2 21.0*   ANION GAP 12.0   BUN 11   CREATININE 0.70   EGFR 98.5   GLUCOSE 158*   CALCIUM 9.7   TOTAL PROTEIN 7.9   ALBUMIN 3.5   GLOBULIN 4.4   ALT (SGPT) 15   AST (SGOT) 14   BILIRUBIN 0.5   ALK PHOS 121*   XR Chest 1 View    Result Date: 2/15/2023  New left basilar airspace opacities suspicious for pneumonia.  Electronically Signed: Jaylan Amelia  2/15/2023 10:36 AM EST  Workstation ID: [id number]    ECG 12 Lead Chest Pain   Preliminary Result   HEART RATE= 83  bpm   RR Interval= 720  ms   FL Interval= 159  ms   P Horizontal Axis= 1  deg   P Front Axis= 44  deg   QRSD Interval= 105  ms   QT Interval= 349  ms   QRS Axis= 43  deg   T Wave Axis= 20  deg   - NORMAL ECG -   Sinus rhythm   No previous ECG available for comparison   Electronically Signed By:    Date and Time of Study: 2023-02-15 09:30:13          Assessment & Plan        Active Hospital Problems:  Active Hospital Problems    Diagnosis    • **Pneumonia of left lower lobe due to infectious organism      Plan:   Pneumonia of left lower lobe  Pulmicort and DuoNeb ordered  Continue IV ceftriaxone and azithromycin  Waiting on blood cultures  Ordered sputum culture  Ordered urine S.pneumo antigen and Legionella antigen   CT chest pending  Pulmonology consult    Hyperlipidemia  Continue home atorvastatin  Lipid panel from 1/17/2023 reviewed-HDL 39, LDL 79, triglycerides 167, total cholesterol 147    Hypertension  Continue home metoprolol, lisinopril  We will continue to monitor blood pressure    GERD  Continue home Protonix    Seasonal allergies  Continue home Zyrtec    DVT prophylaxis: Lovenox  Medical DVT prophylaxis orders are present.    CODE STATUS:    Level Of Support Discussed With: Patient  Code Status (Patient has no pulse and is not breathing): CPR (Attempt to Resuscitate)  Medical Interventions (Patient has pulse or is breathing): Full  Support    Admission Status:  I believe this patient meets inpatient status.    I discussed the patient's findings and my recommendations with patient.    Signature: Electronically signed by Tania Handy PA-C, 02/15/23, 17:23 EST.  [facility]    Electronically signed by Tania Handy PA-C at 02/15/23 1818          Emergency Department Notes      Elayne Mcmahan APRN at 02/15/23 0928     Attestation signed by Keon Tavares MD at 02/15/23 1640        SUPERVISE: For this patient encounter, I reviewed the APC's documentation, treatment plan, and medical decision making.  Keon Tavares MD 2/15/2023 16:40 EST                         Subjective    Provider in Triage Note  Patient is a 62-year-old female presents to the ED with complaints of productive cough, posttussis emesis, lateral chest wall pain, and shortness of breath since around Thanksgiving.  She was initially diagnosed with influenza at the end of November but states she has not gotten any better since.  She has been on 5 rounds of antibiotics since then with no improvement.  She denies any hemoptysis.  Patient's  in triage states she does get an echocardiogram 2 weeks ago due to signs of an enlarged heart on a recent chest x-ray but was not able to do it due to the coughing.    Per telephone encounter from 2/10/2023 patient was recently put on cefdinir      History of Present Illness  Provider in triage note reviewed and agree with above.  Additionally, patient states she has felt worse in the last 2 days.  No fever but states she had some chills.  No leg pain or swelling.        Review of Systems   Constitutional: Positive for chills. Negative for fever.   Respiratory: Positive for cough and shortness of breath.    Cardiovascular: Positive for chest pain. Negative for leg swelling.   Gastrointestinal: Negative.        Past Medical History:   Diagnosis Date   • Allergic rhinitis    • Anemia 04/2020   • Asthma 11/04/2011    • Calcaneal spur of foot, right 02/20/2021    XRAY AT [facility]   • Cancer (HCC)     Appendcele mucous neoplasm   • Constipation 07/11/2012   • Contact dermatitis 07/16/2015   • Depression 05/28/2013   • Disease of thyroid gland     HYPOTHYROIDISM   • Diverticulitis 02/01/2012   • Dyspnea 07/13/2020    SEEN AT [facility]   • Gastroesophageal reflux disease 05/28/2013   • Hyperlipidemia    • Hypertension    • MRSA infection 04/25/2020   • Myalgia and myositis 09/13/2011   • Osteoarthritis 10/30/2014   • Ovarian cyst 04/23/2013   • Ovarian enlargement, left    • Plantar fasciitis, right 02/20/2021    SEEN AT [facility]   • Seasonal allergies    • Sleep apnea 05/21/2021    NO CPAP       Allergies   Allergen Reactions   • Promethazine Anxiety and Hallucinations     AKA PHENERGAN, Delusions, hallucinations       Past Surgical History:   Procedure Laterality Date   • APPENDECTOMY N/A 03/20/2020    LAPAROSCOPIC, DR. WILLIAM CHEADLE AT [place]   • CARDIAC CATHETERIZATION Left 10/02/2009    No Intervention   • CHOLECYSTECTOMY N/A 02/08/2005    DR. CHRIS LOZA AT [facility]   • COLON RESECTION LEFT Left 2010    D/T RUPTURED DIVERTICULA, DR. DEA CROWE   • COLON RESECTION SMALL BOWEL N/A 04/07/2020    LAPAROSCOPY, MOBILIZATION OF RIGHT COLON, CONVERSION TO OPEN WITH RIGHT TRANSVERSE INCISION, EXTENSIVE LYSIS OF ADHESIONS, AND ILEOCECTOMY, DR. WILLIAM CHEADLE AT [place]   • COLONOSCOPY N/A 03/2012    WITH ILEOSCOPY   • COLONOSCOPY N/A 11/09/2020    A FEW DIVERTICULA IN DESCENDING AND SIGMOID, MILD INTERNAL HEMORRHOIDS, RLQ ANASTAMOSIS, BX: REACTIVE ILEAL MUCOSA WITH MILD CHRONIC ILEITIS, RESCOPE IN 3 YRS, DR. AUSTEN NÚÑEZ AT [facility]   • COLONOSCOPY W/ POLYPECTOMY N/A 02/24/2017    BULBUOUS APPENDIX, POLYPS   • DIAGNOSTIC LAPAROSCOPY N/A 1993    FOR ENDOMETRIOSIS   • DIAGNOSTIC LAPAROSCOPY N/A 1992    FOR ENDOMETRIOSIS   • ENDOSCOPY AND COLONOSCOPY N/A 2009   • ERCP WITH  SPHINCTEROTOMY/PAPILLOTOMY N/A 2005    WITH STENT   • FOOT SURGERY Right 2003   • HEMICOLECTOMY Left 06/13/2012    LAPAROSCOPIC LEFT AND SIGMOID HEMICOLECTOMY WITH TRANSVERSE RECTAL ANASTAMOSIS AND LAPAROSCOPIC TAKEDOWN OF SPLENIC FLEXURE, D/T DIVERTICULAR PERFORATION WITH ABSCESS, DR. ABY MCFARLAND AT [facility]   • HYSTERECTOMY N/A 1992    ROZINA WITH BSO       Family History   Problem Relation Age of Onset   • Cancer Mother    • Liver cancer Mother    • Basal cell carcinoma Mother    • Diabetes Mother    • Cancer Father    • Lung cancer Father    • Hypertension Father         And also my mother had as well as my self   • Celiac disease Father    • Heart disease Father    • Diverticulitis Sister    • Hypertension Sister    • Cancer Brother         BLADDER CANCER   • Cancer Brother    • Stomach cancer Brother    • Cancer Maternal Aunt    • Ovarian cancer Maternal Aunt    • Cancer Paternal Aunt    • Breast cancer Paternal Aunt    • Cancer Paternal Uncle    • Stomach cancer Paternal Uncle    • Heart disease Maternal Grandmother    • Heart disease Paternal Grandmother    • Heart failure Paternal Grandmother        Social History     Socioeconomic History   • Marital status:    Tobacco Use   • Smoking status: Never   • Smokeless tobacco: Never   • Tobacco comments:     My father was a heavy smoker, and my brothers all smoke   Vaping Use   • Vaping Use: Never used   Substance and Sexual Activity   • Alcohol use: Yes     Alcohol/week: 3.0 standard drinks     Types: 1 Glasses of wine, 1 Cans of beer, 1 Shots of liquor per week     Comment: rarely   • Drug use: Never   • Sexual activity: Yes     Partners: Male     Birth control/protection: Surgical, Post-menopausal, Hysterectomy, Same-sex partner     Comment: My            Objective   Physical Exam  Vital signs and triage nurse note reviewed.  Constitutional: Awake, alert; well-developed and well-nourished. No acute distress is noted.  Obese.  HEENT:  Normocephalic, atraumatic; pupils are PERRL with intact EOM; oropharynx is pink and moist without exudate or erythema.  No drooling or pooling of oral secretions.  Neck: Supple, full range of motion without pain; no cervical lymphadenopathy. Normal phonation.  Cardiovascular: Regular rate and rhythm, normal S1-S2.  No murmur noted.  Pulmonary: Respiratory effort regular nonlabored, breath sounds clear to auscultation all fields.  Abdomen: Soft, nontender, nondistended with normoactive bowel sounds; no rebound or guarding.  Musculoskeletal: Independent range of motion of all extremities with no palpable tenderness or edema.  Neuro: Alert oriented x3, speech is clear and appropriate, GCS 15.    Skin: Flesh tone, warm, dry, intact; no erythematous or petechial rash or lesion.      Procedures          ED Course      Labs Reviewed   COMPREHENSIVE METABOLIC PANEL - Abnormal; Notable for the following components:       Result Value    Glucose 158 (*)     Sodium 133 (*)     CO2 21.0 (*)     Alkaline Phosphatase 121 (*)     All other components within normal limits    Narrative:     GFR Normal >60  Chronic Kidney Disease <60  Kidney Failure <15     CBC WITH AUTO DIFFERENTIAL - Abnormal; Notable for the following components:    WBC 18.70 (*)     Hemoglobin 10.9 (*)     MCV 75.8 (*)     MCH 24.1 (*)     RDW 16.2 (*)     Neutrophil % 82.9 (*)     Lymphocyte % 5.0 (*)     Neutrophils, Absolute 15.50 (*)     Monocytes, Absolute 2.10 (*)     All other components within normal limits   RESPIRATORY PANEL PCR W/ COVID-19 (SARS-COV-2) ETHEL/SADIE/[facility]/PAD/[facility]/M[facility] IN-HOUSE, NP SWAB IN [facility], 3-4 HR TAT - Normal    Narrative:     In the setting of a positive respiratory panel with a viral infection PLUS a negative procalcitonin without other underlying concern for bacterial infection, consider observing off antibiotics or discontinuation of antibiotics and continue supportive care. If the respiratory panel is positive for atypical  bacterial infection (Bordetella pertussis, Chlamydophila pneumoniae, or Mycoplasma pneumoniae), consider antibiotic de-escalation to target atypical bacterial infection.   TROPONIN - Normal    Narrative:     High Sensitive Troponin T Reference Range:  <10.0 ng/L- Negative Female for AMI  <15.0 ng/L- Negative Male for AMI  >=10 - Abnormal Female indicating possible myocardial injury.  >=15 - Abnormal Male indicating possible myocardial injury.   Clinicians would have to utilize clinical acumen, EKG, Troponin, and serial changes to determine if it is an Acute Myocardial Infarction or myocardial injury due to an underlying chronic condition.        BNP (IN-HOUSE) - Normal    Narrative:     Among patients with dyspnea, NT-proBNP is highly sensitive for the detection of acute congestive heart failure. In addition NT-proBNP of <300 pg/ml effectively rules out acute congestive heart failure with 99% negative predictive value.    Results may be falsely decreased if patient taking Biotin.     HIGH SENSITIVITIY TROPONIN T 2HR - Normal    Narrative:     High Sensitive Troponin T Reference Range:  <10.0 ng/L- Negative Female for AMI  <15.0 ng/L- Negative Male for AMI  >=10 - Abnormal Female indicating possible myocardial injury.  >=15 - Abnormal Male indicating possible myocardial injury.   Clinicians would have to utilize clinical acumen, EKG, Troponin, and serial changes to determine if it is an Acute Myocardial Infarction or myocardial injury due to an underlying chronic condition.        BLOOD CULTURE   BLOOD CULTURE   CBC AND DIFFERENTIAL    Narrative:     The following orders were created for panel order CBC & Differential.  Procedure                               Abnormality         Status                     ---------                               -----------         ------                     CBC Auto Differential[[id number]]        Abnormal            Final result                 Please view results for these tests on  the individual orders.     XR Chest 1 View    Result Date: 2/15/2023  New left basilar airspace opacities suspicious for pneumonia.  Electronically Signed: Jaylan Cisneros  2/15/2023 10:36 AM EST  Workstation ID: [id number]    Medications   sodium chloride 0.9 % flush 10 mL (has no administration in time range)   cefTRIAXone (ROCEPHIN) 2 g in sodium chloride 0.9 % 100 mL IVPB (has no administration in time range)   azithromycin (ZITHROMAX) 500 mg in sodium chloride 0.9 % 250 mL IVPB-VTB (has no administration in time range)                                          Medical Decision Making  Patient presents from home with complaints of increased shortness of breath and cough over the last 2 days.  She states she has been ill since Thanksgiving with a productive cough with green sputum.  States has been on multiple rounds of antibiotics.    She had above exam and evaluation.  IV was established.  Labs EKG and chest x-ray were obtained.    Work-up: EKG reviewed and interpreted by myself and corroborated by Dr. Tavares shows sinus rhythm with ventricular rate of 83, no acute ST or T wave changes.  CBC is significant for WBC of 18.7 with 82% neutrophils, no bands.  Metabolic panel is grossly unremarkable.  proBNP within normal limits at 59.  High-sensitivity troponin within normal limits at 9.  Viral respiratory panel is negative.  Chest x-ray reviewed and interpreted by myself but corroborated by the radiologist shows left basilar opacity suspicious for pneumonia.    Reexamination patient is resting quietly and in no distress.  Her bedside O2 saturation is at 91 to 92% on room air.  She is in no distress.  States she does not normally wear oxygen at home.  She has no prior respiratory history.    Given that she has been on several rounds of antibiotics with no improvement and pneumonia on her chest x-ray today with 18,000 white count she will be admitted to the hospital for IV antibiotics.  She is hemodynamically  stable.    Case and plan discussed with the hospitalist PA.    Diagnosis and treatment plan discussed with patient.  Patient agreeable to plan.       Pneumonia of left lower lobe due to infectious organism: acute illness or injury  Shortness of breath: chronic illness or injury  Risk  Decision regarding hospitalization.          Final diagnoses:   Pneumonia of left lower lobe due to infectious organism   Shortness of breath       ED Disposition  ED Disposition     ED Disposition   Decision to Admit    Condition   --    Comment   --             No follow-up provider specified.       Medication List      No changes were made to your prescriptions during this visit.          Elayne Mcmahan, APRN  02/15/23 1239      Electronically signed by Keon Tavares MD at 02/15/23 1640       Vital Signs (last day)     Date/Time Temp Temp src Pulse Resp BP Patient Position SpO2    02/16/23 0825 -- -- 90 16 152/76 Lying 98    02/16/23 0823 -- -- -- 18 -- -- --    02/16/23 0820 -- -- -- 18 -- -- --    02/16/23 0819 -- -- -- 18 -- -- --    02/16/23 0816 -- -- -- 18 -- -- --    02/16/23 0400 -- Oral -- -- -- -- --    02/16/23 0106 99.5 (37.5) Oral 89 18 127/58 Lying 93    02/15/23 1945 -- -- 95 16 -- -- 98    02/15/23 1941 -- -- 93 16 -- -- 99    02/15/23 1940 -- -- 93 14 -- -- 99    02/15/23 1935 -- -- 92 14 -- -- 98    02/15/23 1729 98.7 (37.1) Oral 95 12 139/62 Lying 91    02/15/23 1532 98.7 (37.1) Oral 94 20 124/61 Sitting 92    02/15/23 1510 -- -- 85 19 -- -- 95    02/15/23 1459 -- -- 88 19 -- -- 95    02/15/23 13:44:49 -- -- 80 24 140/72 -- 95    02/15/23 10:32:25 -- -- 82 24 137/68 -- 93    02/15/23 0927 98.5 (36.9) -- -- -- -- -- --    02/15/23 0925 -- Oral 87 28 149/65 Sitting 95          Physician Progress Notes (all)    No notes of this type exist for this encounter.         Consult Notes (all)    No notes of this type exist for this encounter.

## 2023-02-16 NOTE — PLAN OF CARE
Problem: Adult Inpatient Plan of Care  Goal: Absence of Hospital-Acquired Illness or Injury  Intervention: Identify and Manage Fall Risk  Recent Flowsheet Documentation  Taken 2/16/2023 1600 by Tessa Kingston RN  Safety Promotion/Fall Prevention:   assistive device/personal items within reach   clutter free environment maintained   safety round/check completed   nonskid shoes/slippers when out of bed  Taken 2/16/2023 1400 by Tessa Kingston RN  Safety Promotion/Fall Prevention:   assistive device/personal items within reach   clutter free environment maintained   safety round/check completed   nonskid shoes/slippers when out of bed  Taken 2/16/2023 1215 by Tessa Kingston RN  Safety Promotion/Fall Prevention:   assistive device/personal items within reach   clutter free environment maintained   safety round/check completed   nonskid shoes/slippers when out of bed  Taken 2/16/2023 1000 by Tessa Kingston RN  Safety Promotion/Fall Prevention:   assistive device/personal items within reach   clutter free environment maintained   safety round/check completed   nonskid shoes/slippers when out of bed  Taken 2/16/2023 0830 by Tessa Kingston RN  Safety Promotion/Fall Prevention:   assistive device/personal items within reach   clutter free environment maintained   safety round/check completed   nonskid shoes/slippers when out of bed  Intervention: Prevent Skin Injury  Recent Flowsheet Documentation  Taken 2/16/2023 1600 by Tessa Kingston RN  Body Position: position changed independently  Taken 2/16/2023 1215 by Tessa Kingston RN  Body Position: position changed independently  Taken 2/16/2023 0830 by Tessa Kingston RN  Body Position: position changed independently  Intervention: Prevent and Manage VTE (Venous Thromboembolism) Risk  Recent Flowsheet Documentation  Taken 2/16/2023 0830 by Tessa Kingston RN  Activity Management:   activity adjusted per tolerance   up ad jermaine  Goal:  Optimal Comfort and Wellbeing  Intervention: Provide Person-Centered Care  Recent Flowsheet Documentation  Taken 2/16/2023 1215 by Tessa Kingston RN  Trust Relationship/Rapport: care explained  Taken 2/16/2023 0830 by Tessa Kingston RN  Trust Relationship/Rapport:   care explained   questions answered   questions encouraged     Problem: Fall Injury Risk  Goal: Absence of Fall and Fall-Related Injury  Intervention: Promote Injury-Free Environment  Recent Flowsheet Documentation  Taken 2/16/2023 1600 by Tessa Kingston RN  Safety Promotion/Fall Prevention:   assistive device/personal items within reach   clutter free environment maintained   safety round/check completed   nonskid shoes/slippers when out of bed  Taken 2/16/2023 1400 by Tessa Kingston RN  Safety Promotion/Fall Prevention:   assistive device/personal items within reach   clutter free environment maintained   safety round/check completed   nonskid shoes/slippers when out of bed  Taken 2/16/2023 1215 by Tessa Kingston RN  Safety Promotion/Fall Prevention:   assistive device/personal items within reach   clutter free environment maintained   safety round/check completed   nonskid shoes/slippers when out of bed  Taken 2/16/2023 1000 by Tessa Kingston RN  Safety Promotion/Fall Prevention:   assistive device/personal items within reach   clutter free environment maintained   safety round/check completed   nonskid shoes/slippers when out of bed  Taken 2/16/2023 0830 by Tessa Kingston RN  Safety Promotion/Fall Prevention:   assistive device/personal items within reach   clutter free environment maintained   safety round/check completed   nonskid shoes/slippers when out of bed   Goal Outcome Evaluation:

## 2023-02-16 NOTE — NURSING NOTE
Paged attending MD for order for cough medication. New telephone order for tessalon pearls 200mg TID PRN. Order repeated and placed in epic.

## 2023-02-16 NOTE — PLAN OF CARE
Goal Outcome Evaluation:               Video fluoroscopic swallow study conducted in the lateral position with pt seated upright. Pt self fed all trials as provided by SLP respectively: thin liquid by cup x 2, puree (applesauce) x 2, mechanical soft/mixed consistency (peaches) x 2, regular solid (cracker) x 1, thin by cup & esophageal scan.      Bolus formation, cohesion, & transit are WFL for all trials. Spillage of juice from peaches does reach the valleculae on 1/2 trials however patient elicits a swallow to clear while continuing to prepare the bolus. Laryngeal elevation, epiglottic deflection, and forward hyolaryngeal movement are WFL. Mild amount of residual at the UES after the swallow. There is no laryngeal vestibule penetration, aspiration, or significant hypopharyngeal residue. Patients esophagus briefly scanned at end of the evaluation revealing clearance to be WFL.     Recommend a regular diet w/thin liquids. ST to follow up for diet tolerance.

## 2023-02-16 NOTE — MBS/VFSS/FEES
Acute Care - Speech Language Pathology   Swallow Initial Evaluation  [facility]     Patient Name: Eve Garrett  : 1960  MRN: [id number]  Today's Date: 2023               Admit Date: 2/15/2023    Visit Dx:     ICD-10-CM ICD-9-CM   1. Pneumonia of left lower lobe due to infectious organism  J18.9 486   2. Shortness of breath  R06.02 786.05     Patient Active Problem List   Diagnosis   • Abdominal pain   • Asthma   • Constipation   • Contact dermatitis   • Depression   • Family history of lung cancer   • Gastroesophageal reflux disease   • Essential hypertension   • Low grade mucinous neoplasm of appendix   • Myalgia and myositis   • Osteoarthritis   • Hyperlipidemia   • Observed sleep apnea   • Carcinoid tumor of appendix   • Allergic rhinitis   • Anemia   • Disease of thyroid gland   • Bilateral plantar fasciitis   • Calcaneal spur of foot, right   • MRSA infection   • Seasonal allergies   • Dyspnea   • Morbid (severe) obesity due to excess calories (HCC)   • Class 3 severe obesity due to excess calories without serious comorbidity with body mass index (BMI) of 40.0 to 44.9 in adult (HCC)   • Snoring   • Non-restorative sleep   • Hypersomnia   • Neoplasm of uncertain behavior of appendix   • Other specified diseases of appendix   • Morbid obesity (HCC)   • Syncope and collapse   • Vitamin D deficiency   • Pneumonia of left lower lobe due to infectious organism     Past Medical History:   Diagnosis Date   • Allergic rhinitis    • Anemia 2020   • Asthma 2011   • Calcaneal spur of foot, right 2021    XRAY AT [facility]   • Cancer (HCC)     Appendcele mucous neoplasm   • Constipation 2012   • Contact dermatitis 2015   • Depression 2013   • Disease of thyroid gland     HYPOTHYROIDISM   • Diverticulitis 2012   • Dyspnea 2020    SEEN AT [facility] ER   • Gastroesophageal reflux disease 2013   • Hyperlipidemia    • Hypertension    • MRSA  infection 04/25/2020   • Myalgia and myositis 09/13/2011   • Osteoarthritis 10/30/2014   • Ovarian cyst 04/23/2013   • Ovarian enlargement, left    • Plantar fasciitis, right 02/20/2021    SEEN AT [facility] ER   • Seasonal allergies    • Sleep apnea 05/21/2021    NO CPAP     Past Surgical History:   Procedure Laterality Date   • APPENDECTOMY N/A 03/20/2020    LAPAROSCOPIC, DR. WILLIAM CHEADLE AT [place]   • CARDIAC CATHETERIZATION Left 10/02/2009    No Intervention   • CHOLECYSTECTOMY N/A 02/08/2005    DR. CHRIS LOZA AT [facility]   • COLON RESECTION LEFT Left 2010    D/T RUPTURED DIVERTICULA, DR. DEA CROWE   • COLON RESECTION SMALL BOWEL N/A 04/07/2020    LAPAROSCOPY, MOBILIZATION OF RIGHT COLON, CONVERSION TO OPEN WITH RIGHT TRANSVERSE INCISION, EXTENSIVE LYSIS OF ADHESIONS, AND ILEOCECTOMY, DR. WILLIAM CHEADLE AT [place]   • COLONOSCOPY N/A 03/2012    WITH ILEOSCOPY   • COLONOSCOPY N/A 11/09/2020    A FEW DIVERTICULA IN DESCENDING AND SIGMOID, MILD INTERNAL HEMORRHOIDS, RLQ ANASTAMOSIS, BX: REACTIVE ILEAL MUCOSA WITH MILD CHRONIC ILEITIS, RESCOPE IN 3 YRS, DR. AUSTEN NÚÑEZ AT [facility]   • COLONOSCOPY W/ POLYPECTOMY N/A 02/24/2017    BULBUOUS APPENDIX, POLYPS   • DIAGNOSTIC LAPAROSCOPY N/A 1993    FOR ENDOMETRIOSIS   • DIAGNOSTIC LAPAROSCOPY N/A 1992    FOR ENDOMETRIOSIS   • ENDOSCOPY AND COLONOSCOPY N/A 2009   • ERCP WITH SPHINCTEROTOMY/PAPILLOTOMY N/A 2005    WITH STENT   • FOOT SURGERY Right 2003   • HEMICOLECTOMY Left 06/13/2012    LAPAROSCOPIC LEFT AND SIGMOID HEMICOLECTOMY WITH TRANSVERSE RECTAL ANASTAMOSIS AND LAPAROSCOPIC TAKEDOWN OF SPLENIC FLEXURE, D/T DIVERTICULAR PERFORATION WITH ABSCESS, DR. ABY MCFARLAND AT [facility]   • HYSTERECTOMY N/A 1992    ROZINA WITH BSO       SLP Recommendation and Plan  SLP Swallowing Diagnosis: functional oral phase, functional pharyngeal phase (02/16/23 1400)  SLP Diet Recommendation: regular textures, thin liquids (02/16/23 1400)  Recommended  Precautions and Strategies: upright posture during/after eating (02/16/23 1400)  SLP Rec. for Method of Medication Administration: as tolerated (02/16/23 1400)     Monitor for Signs of Aspiration: yes, notify SLP if any concerns (02/16/23 1400)     Swallow Criteria for Skilled Therapeutic Interventions Met: demonstrates skilled criteria (02/16/23 1400)     Rehab Potential/Prognosis, Swallowing: good, to achieve stated therapy goals (02/16/23 1400)  Therapy Frequency (Swallow): PRN (02/16/23 1400)  Predicted Duration Therapy Intervention (Days): until discharge (02/16/23 1400)                                               SWALLOW EVALUATION (last 72 hours)     SLP Adult Swallow Evaluation     Row Name 02/16/23 1400          Document Type evaluation  -EC    Subjective Information complains of  cough  -EC    Patient Observations alert;cooperative;agree to therapy  -EC    Patient/Family/Caregiver Comments/Observations --    Patient Effort good  -EC          Patient Profile Reviewed yes  -EC    Pertinent History Of Current Problem Per H&P:    pt is a 62 y.o. female with past medical history of asthma, GERD, hypertension, hyperlipidemia, sleep apnea who presented to [facility] on 2/15/2023 complaining of productive cough, and shortness of air.  She states she has been feeling sick with a cough since Thanksgiving, 2022.  She states that she has received 3-4 rounds of antibiotics outpatient without much improvement of her symptoms.  She is currently been on amoxicillin since Saturday and denies any improvement of her symptoms.  She states that over the course of the past 4 months her cough has been productive and the sputum has progressed from a pale green color to light pink to green and gray.  She states she has not had an appetite for the past week and complains of nausea, vomiting, and diarrhea.  Patient also has sleep apnea but she reports she has not been on her CPAP machine because it is broken.  She is not  on home oxygen currently.  She also has been experiencing occasional night sweats, fevers and chills. she denies any hemoptysis or hematochezia.  -EC    Current Method of Nutrition NPO  -EC    Precautions/Limitations, Vision WFL;for purposes of eval  -EC    Precautions/Limitations, Hearing WFL;for purposes of eval  -EC    Prior Level of Function-Communication WFL  -EC    Prior Level of Function-Swallowing no diet consistency restrictions  -EC    Plans/Goals Discussed with patient  -EC          Dentition Assessment natural, present and adequate  -EC    Secretion Management WNL/WFL  -EC          Oral Motor General Assessment WFL  -EC          Respiratory Support Currently in Use room air  -EC          Utensils Used spoon;cup  -EC    Consistencies Trialed regular textures;chopped;mixed consistency;pureed;thin liquids  -EC          VFSS Summary Video fluoroscopic swallow study conducted in the lateral position with pt seated upright. Pt self fed all trials as provided by SLP respectively: thin liquid by cup x 2, puree (applesauce) x 2, mechanical soft/mixed consistency (peaches) x 2, regular solid (cracker) x 1, thin by cup & esophageal scan.  Bolus formation, cohesion, & transit are WFL for all trials. Spillage of juice from peaches does reach the valleculae on 1/2 trials however patient elicits a swallow to clear while continuing to prepare the bolus. Laryngeal elevation, epiglottic deflection, and forward hyolaryngeal movement are WFL. Mild amount of residual at the UES after the swallow. There is no laryngeal vestibule penetration, aspiration, or significant hypopharyngeal residue. Patients esophagus briefly scanned at end of the evaluation revealing clearance to be WFL. Recommend a regular diet w/thin liquids. ST to follow up for diet tolerance. -EC          SLP Swallowing Diagnosis functional oral phase;functional pharyngeal phase  -EC    Functional Impact no impact on function  -EC    Rehab Potential/Prognosis,  Swallowing good, to achieve stated therapy goals  -EC    Swallow Criteria for Skilled Therapeutic Interventions Met demonstrates skilled criteria  -EC          Care Plan Review evaluation/treatment results reviewed;care plan/treatment goals reviewed  -EC          Therapy Frequency (Swallow) PRN  -EC    Predicted Duration Therapy Intervention (Days) until discharge  -EC    SLP Diet Recommendation regular textures;thin liquids  -EC    Recommended Diagnostics --    Recommended Precautions and Strategies upright posture during/after eating  -EC    Oral Care Recommendations Oral Care BID/PRN  -EC    SLP Rec. for Method of Medication Administration as tolerated  -EC    Monitor for Signs of Aspiration yes;notify SLP if any concerns  -EC          Swallow LTGs Swallow Long Term Goal (free text)  -EC    Swallow STGs diet tolerance goal selection (SLP)  -EC    Diet Tolerance Goal Selection (SLP) Swallow Short Term Goal 1  -EC          (LTG) Swallow Pt will maximize swallow function for least restrictive PO diet, exhibiting no complication associated with dysphagia, adequate PO intake, and demonstrating independent use of swallow compensations  -EC    [place] (Swallow Long Term Goal) independently (over 90% accuracy)  -EC    Time Frame (Swallow Long Term Goal) by discharge  -EC    Progress/Outcomes (Swallow Long Term Goal) good progress toward goal;goal ongoing  -EC          (STG) Swallow 1 The patient will participate in ongoing assessment of swallow, including re-evaluation clinically and/or including instrumental assessment of swallow if indicated, to further assess swallow function in anticipation to initiate a po diet  -EC    Time Frame (Swallow Short Term Goal 1) --    Progress/Outcomes (Swallow Short Term Goal 1) goal met  -EC          User Key  (r) = Recorded By, (t) = Taken By, (c) = Cosigned By    Initials Name Effective Dates    EC Montserrat Bush 06/16/21 -      Hudson Floyd, SLP 01/26/23 -                  EDUCATION  The patient has been educated in the following areas:   Dysphagia (Swallowing Impairment).        SLP GOALS     Row Name 02/16/23 1400 02/16/23 1200          (LTG) Swallow    (LTG) Swallow Pt will maximize swallow function for least restrictive PO diet, exhibiting no complication associated with dysphagia, adequate PO intake, and demonstrating independent use of swallow compensations  -EC Pt will maximize swallow function for least restrictive PO diet, exhibiting no complication associated with dysphagia, adequate PO intake, and demonstrating independent use of swallow compensations  -RM     [place] (Swallow Long Term Goal) independently (over 90% accuracy)  -EC --     Time Frame (Swallow Long Term Goal) by discharge  -EC by discharge  -RM     Progress/Outcomes (Swallow Long Term Goal) good progress toward goal;goal ongoing  -EC --        (STG) Swallow 1    (STG) Swallow 1 The patient will participate in ongoing assessment of swallow, including re-evaluation clinically and/or including instrumental assessment of swallow if indicated, to further assess swallow function in anticipation to initiate a po diet  -EC The patient will participate in ongoing assessment of swallow, including re-evaluation clinically and/or including instrumental assessment of swallow if indicated, to further assess swallow function in anticipation to initiate a po diet  -RM     Time Frame (Swallow Short Term Goal 1) -- other (see comments)  2 days  -RM     Progress/Outcomes (Swallow Short Term Goal 1) goal met  -EC --           User Key  (r) = Recorded By, (t) = Taken By, (c) = Cosigned By    Initials Name Provider Type    EC Montserrat Bush Speech and Language Pathologist    Hudson Braga, SLP Speech and Language Pathologist                   Time Calculation:                Montserrat Bush  2/16/2023

## 2023-02-16 NOTE — PROGRESS NOTES
[facility] Medicine Services Daily Progress Note    Patient Name: Eve Garrett  : 1960  MRN: [id number]  Primary Care Physician:  Nunu Kingston MD  Date of admission: 2/15/2023      Subjective      Chief Complaint: Cough and weakness      Patient Reports   Some improvement in cough and generalized malaise.  Patient denies any chest pain, fevers or chills.    ROS   Negative except as mentioned above      Objective      Vitals:   Temp:  [98.3 °F (36.8 °C)-99.5 °F (37.5 °C)] 98.3 °F (36.8 °C)  Heart Rate:  [79-95] 84  Resp:  [12-21] 21  BP: (125-152)/(58-76) 125/58    Physical Exam  Constitutional:       General: She is not in acute distress.     Appearance: She is not ill-appearing.   HENT:      Mouth/Throat:      Mouth: Mucous membranes are moist.   Eyes:      Pupils: Pupils are equal, round, and reactive to light.   Cardiovascular:      Rate and Rhythm: Normal rate.   Pulmonary:      Effort: Pulmonary effort is normal. No respiratory distress.   Neurological:      General: No focal deficit present.      Mental Status: She is alert and oriented to person, place, and time.               Result Review    Result Review:  I have personally reviewed the results from the time of this admission to 2023 17:03 EST and agree with these findings:  [x]  Laboratory  [x]  Microbiology  [x]  Radiology  []  EKG/Telemetry   []  Cardiology/Vascular   []  Pathology  []  Old records  []  Other:  Most notable findings include:         Assessment & Plan      Brief Patient Summary:  Eve Garrett is a 62 y.o. female who is admitted for community-acquired pneumonia      atorvastatin, 40 mg, Oral, Nightly  budesonide, 1 mg, Nebulization, BID - RT  cefTRIAXone, 2 g, Intravenous, Q24H  cetirizine, 10 mg, Oral, Daily  enoxaparin, 40 mg, Subcutaneous, Q12H  ipratropium-albuterol, 3 mL, Nebulization, 4x Daily - RT  lisinopril, 40 mg, Oral, Daily  metoprolol tartrate, 50 mg, Oral, BID  pantoprazole, 40  mg, Oral, Q AM  sodium chloride, 10 mL, Intravenous, Q12H       Pharmacy to Dose enoxaparin (LOVENOX),          Active Hospital Problems:  Active Hospital Problems    Diagnosis    • **Pneumonia of left lower lobe due to infectious organism      Plan:     Pneumonia of left lower lobe with concern for aspiration  Currently being evaluated by SLP underwent MBS now recommending regular textures and thin liquids  Pulmicort and DuoNeb ordered  Continue IV ceftriaxone   Follow-up blood cultures  Sputum cultures were rejected  ]S.pneumo antigen and Legionella antigen  were negative  CT chest showing multifocal pneumonia  Pulmonology following     Hyperlipidemia  Continue home atorvastatin  Lipid panel from 1/17/2023 reviewed-HDL 39, LDL 79, triglycerides 167, total cholesterol 147     Hypertension  Continue home metoprolol, lisinopril  We will continue to monitor blood pressure     GERD  Continue home Protonix     Seasonal allergies  Continue home Zyrtec    DVT prophylaxis:  Medical DVT prophylaxis orders are present.    CODE STATUS:    Level Of Support Discussed With: Patient  Code Status (Patient has no pulse and is not breathing): CPR (Attempt to Resuscitate)  Medical Interventions (Patient has pulse or is breathing): Full Support      Disposition:  I expect patient to be discharged next 2 to 3 days.        Electronically signed by Raudel Macdonald MD, 02/16/23, 17:03 EST.  [facility]anda[facility] Dines[facility] Hospitalist Team

## 2023-02-16 NOTE — THERAPY EVALUATION
Acute Care - Speech Language Pathology   Swallow Initial Evaluation  Dinesh     Patient Name: Eve Garrett  : 1960  MRN: [id number]  Today's Date: 2023               Admit Date: 2/15/2023    Visit Dx:     ICD-10-CM ICD-9-CM   1. Pneumonia of left lower lobe due to infectious organism  J18.9 486   2. Shortness of breath  R06.02 786.05     Patient Active Problem List   Diagnosis   • Abdominal pain   • Asthma   • Constipation   • Contact dermatitis   • Depression   • Family history of lung cancer   • Gastroesophageal reflux disease   • Essential hypertension   • Low grade mucinous neoplasm of appendix   • Myalgia and myositis   • Osteoarthritis   • Hyperlipidemia   • Observed sleep apnea   • Carcinoid tumor of appendix   • Allergic rhinitis   • Anemia   • Disease of thyroid gland   • Bilateral plantar fasciitis   • Calcaneal spur of foot, right   • MRSA infection   • Seasonal allergies   • Dyspnea   • Morbid (severe) obesity due to excess calories (HCC)   • Class 3 severe obesity due to excess calories without serious comorbidity with body mass index (BMI) of 40.0 to 44.9 in adult (HCC)   • Snoring   • Non-restorative sleep   • Hypersomnia   • Neoplasm of uncertain behavior of appendix   • Other specified diseases of appendix   • Morbid obesity (HCC)   • Syncope and collapse   • Vitamin D deficiency   • Pneumonia of left lower lobe due to infectious organism     Past Medical History:   Diagnosis Date   • Allergic rhinitis    • Anemia 2020   • Asthma 2011   • Calcaneal spur of foot, right 2021    XRAY AT [facility]   • Cancer (HCC)     Appendcele mucous neoplasm   • Constipation 2012   • Contact dermatitis 2015   • Depression 2013   • Disease of thyroid gland     HYPOTHYROIDISM   • Diverticulitis 2012   • Dyspnea 2020    SEEN AT [facility] ER   • Gastroesophageal reflux disease 2013   • Hyperlipidemia    • Hypertension    • MRSA  infection 04/25/2020   • Myalgia and myositis 09/13/2011   • Osteoarthritis 10/30/2014   • Ovarian cyst 04/23/2013   • Ovarian enlargement, left    • Plantar fasciitis, right 02/20/2021    SEEN AT [facility] ER   • Seasonal allergies    • Sleep apnea 05/21/2021    NO CPAP     Past Surgical History:   Procedure Laterality Date   • APPENDECTOMY N/A 03/20/2020    LAPAROSCOPIC, DR. WILLIAM CHEADLE AT [place]   • CARDIAC CATHETERIZATION Left 10/02/2009    No Intervention   • CHOLECYSTECTOMY N/A 02/08/2005    DR. CHRIS LOZA AT [facility]   • COLON RESECTION LEFT Left 2010    D/T RUPTURED DIVERTICULA, DR. DEA CROWE   • COLON RESECTION SMALL BOWEL N/A 04/07/2020    LAPAROSCOPY, MOBILIZATION OF RIGHT COLON, CONVERSION TO OPEN WITH RIGHT TRANSVERSE INCISION, EXTENSIVE LYSIS OF ADHESIONS, AND ILEOCECTOMY, DR. WILLIAM CHEADLE AT [place]   • COLONOSCOPY N/A 03/2012    WITH ILEOSCOPY   • COLONOSCOPY N/A 11/09/2020    A FEW DIVERTICULA IN DESCENDING AND SIGMOID, MILD INTERNAL HEMORRHOIDS, RLQ ANASTAMOSIS, BX: REACTIVE ILEAL MUCOSA WITH MILD CHRONIC ILEITIS, RESCOPE IN 3 YRS, DR. AUSTEN NÚÑEZ AT [facility]   • COLONOSCOPY W/ POLYPECTOMY N/A 02/24/2017    BULBUOUS APPENDIX, POLYPS   • DIAGNOSTIC LAPAROSCOPY N/A 1993    FOR ENDOMETRIOSIS   • DIAGNOSTIC LAPAROSCOPY N/A 1992    FOR ENDOMETRIOSIS   • ENDOSCOPY AND COLONOSCOPY N/A 2009   • ERCP WITH SPHINCTEROTOMY/PAPILLOTOMY N/A 2005    WITH STENT   • FOOT SURGERY Right 2003   • HEMICOLECTOMY Left 06/13/2012    LAPAROSCOPIC LEFT AND SIGMOID HEMICOLECTOMY WITH TRANSVERSE RECTAL ANASTAMOSIS AND LAPAROSCOPIC TAKEDOWN OF SPLENIC FLEXURE, D/T DIVERTICULAR PERFORATION WITH ABSCESS, DR. ABY MCFARLAND AT [facility]   • HYSTERECTOMY N/A 1992    ROZINA WITH BSO       SLP Recommendation and Plan  SLP Swallowing Diagnosis: functional oral phase, R/O pharyngeal dysphagia (02/16/23 1200)  SLP Diet Recommendation: NPO (until VFSS is completed) (02/16/23 1200)               Recommended Diagnostics: reassess via clinical swallow evaluation, VFSS ([facility]) (02/16/23 1200)  Swallow Criteria for Skilled Therapeutic Interventions Met: demonstrates skilled criteria (02/16/23 1200)     Rehab Potential/Prognosis, Swallowing: good, to achieve stated therapy goals (02/16/23 1200)  Therapy Frequency (Swallow): PRN (02/16/23 1200)         SWALLOW EVALUATION (last 72 hours)     SLP Adult Swallow Evaluation     Row Name 02/16/23 1200          Document Type evaluation  -RM    Patient Observations alert;cooperative;agree to therapy  -RM    Patient/Family/Caregiver Comments/Observations caregiver at bedside during evaluation  -RM    Patient Effort good  -RM          Patient Profile Reviewed yes  -RM    Pertinent History Of Current Problem Per H&P:    pt is a 62 y.o. female with past medical history of asthma, GERD, hypertension, hyperlipidemia, sleep apnea who presented to [facility] on 2/15/2023 complaining of productive cough, and shortness of air.  She states she has been feeling sick with a cough since Thanksgiving, 2022.  She states that she has received 3-4 rounds of antibiotics outpatient without much improvement of her symptoms.  She is currently been on amoxicillin since Saturday and denies any improvement of her symptoms.  She states that over the course of the past 4 months her cough has been productive and the sputum has progressed from a pale green color to light pink to green and gray.  She states she has not had an appetite for the past week and complains of nausea, vomiting, and diarrhea.  Patient also has sleep apnea but she reports she has not been on her CPAP machine because it is broken.  She is not on home oxygen currently.  She also has been experiencing occasional night sweats, fevers and chills. she denies any hemoptysis or hematochezia.    Pt not familiar to [facility] department.    CT CHEST WO CONTRAST DIAGNOSTIC     Date of Exam: 2/15/2023 4:18 PM EST     Indication:  Pneumonia, complication suspected, xray done.     Comparison: 4/19/2022.     Technique: Axial CT images were obtained of the chest without contrast administration.  Sagittal and coronal reconstructions were performed.  Automated exposure control and iterative reconstruction methods were used.      Findings:  There is patchy dependent bibasilar pneumonia. Airways appear patent. There is minor right middle lobe pneumonia there is minor bilateral apical pneumonia. No pneumothorax, pleural effusion or lobar consolidation.     The thyroid, trachea and esophagus appear within normal limits. Heart size is normal. No pericardial effusion or mediastinal lymphadenopathy. There are right hilar calcified granulomas. Pericardium is unremarkable.     Superficial soft tissues appear unremarkable. No acute findings in the upper abdomen. There is pneumobilia, likely related to prior sphincterotomy or incompetent sphincter. Correlate with history. There are no acute osseous abnormalities or destructive   bone lesions. There are mild diffuse thoracic degenerative changes.     IMPRESSION:  Impression:  Multifocal pneumonia with primary foci of pneumonia in the dependent lower lobes.    Electronically Signed: Ye Valencia     XR CHEST 1 VW     Date of Exam: 2/15/2023 10:16 AM EST     Indication: lateral chest wall pain.  Cough      Comparison: December 27, 2022     FINDINGS:  There are new left basilar airspace opacities. No significant right lung infiltrate.  No pneumothorax or significant pleural effusion.  Heart size and mediastinal contour appear within normal limits.  No definite osseous abnormality is seen on this   limited single view.     IMPRESSION:  New left basilar airspace opacities suspicious for pneumonia.     Electronically Signed: Ye CADENA    Current Method of Nutrition NPO  -RM    Precautions/Limitations, Vision WFL;for purposes of eval  -RM    Precautions/Limitations, Hearing WFL;for purposes of eval   -RM    Prior Level of Function-Communication WFL  -RM    Prior Level of Function-Swallowing no diet consistency restrictions  -RM    Plans/Goals Discussed with patient;family;other (see comments)  pt's nurse  -RM          Additional Documentation Pain Scale: FACES Pre/Post-Treatment (Group)  -RM          Pain: FACES Scale, Pretreatment 0-->no hurt  -RM    Posttreatment Pain Rating 0-->no hurt  -RM          Oral Lesions or Structural Abnormalities and/or variants none identified  -RM    Dentition Assessment natural, present and adequate  -RM    Secretion Management WNL/WFL  -RM          Oral Motor General Assessment WFL  -RM          Respiratory Support Currently in Use room air  -RM    Eating/Swallowing Skills self-fed  -RM    Positioning During Eating upright 90 degree;upright in bed  -RM    Utensils Used spoon;straw  -RM    Consistencies Trialed ice chips;thin liquids;pureed;mixed consistency;regular textures  -RM          Clinical Swallow Evaluation Summary Clinical swallow evaluation completed on today’s date. Pt alert and amenable to evaluation on today’s date. ST received orders d/t pneumonia diagnosis. Pt’s caregiver at bedside. Baseline dry cough noted prior to trials. Pt currently on room air. Pt reports she has not eaten since Tuesday night d/t coughing that lead to vomiting. Pt’s VQ appeared hoarse. Pt reports no hx of dysphagia or diet modification. Pt reports hx of GERD. Pt states she does follow some reflux precaution strategies (i.e avoiding spicy foods).     Pt seated upright in bed at 90 degrees and self-fed all trials. Pt completed trials of ice chips, thin liquid  by straw, puree, mixed mech soft, and regular solids. Adequate labial seal and no anterior spillage noted. Mastication WFL and no oral residue/pocketing noted.  Initiation of swallow appeared timely. Throughout trials, intermittent dry cough noted. No “wet” VQ noted throughout trials.     D/t pneumonia diagnosis and s/s of possible  aspiration (cough), ST recommends that pt remain NPO until VFSS is completed. Further goals/recommendations to follow.  -RM          SLP Swallowing Diagnosis functional oral phase;R/O pharyngeal dysphagia  -RM    Functional Impact risk of aspiration/pneumonia  -RM    Rehab Potential/Prognosis, Swallowing good, to achieve stated therapy goals  -RM    Swallow Criteria for Skilled Therapeutic Interventions Met demonstrates skilled criteria  -RM          Therapy Frequency (Swallow) PRN  -RM    SLP Diet Recommendation NPO  until VFSS is completed  -RM    Recommended Diagnostics reassess via clinical swallow evaluation;VFSS (MBS)  -RM          Swallow LTGs Swallow Long Term Goal (free text)  -RM    Swallow STGs diet tolerance goal selection (SLP)  -RM    Diet Tolerance Goal Selection (SLP) Swallow Short Term Goal 1  -RM          (LTG) Swallow Pt will maximize swallow function for least restrictive PO diet, exhibiting no complication associated with dysphagia, adequate PO intake, and demonstrating independent use of swallow compensations  -RM    Time Frame (Swallow Long Term Goal) by discharge  -RM          (STG) Swallow 1 The patient will participate in ongoing assessment of swallow, including re-evaluation clinically and/or including instrumental assessment of swallow if indicated, to further assess swallow function in anticipation to initiate a po diet  -RM    Time Frame (Swallow Short Term Goal 1) other (see comments)  2 days  -RM          User Key  (r) = Recorded By, (t) = Taken By, (c) = Cosigned By    Initials Name Effective Dates    RM Hudson Floyd, SLP 01/26/23 -                 EDUCATION  The patient has been educated in the following areas:   Dysphagia (Swallowing Impairment).        SLP GOALS     Row Name 02/16/23 1200          (LTG) Swallow Pt will maximize swallow function for least restrictive PO diet, exhibiting no complication associated with dysphagia, adequate PO intake, and demonstrating independent  use of swallow compensations  -RM    Time Frame (Swallow Long Term Goal) by discharge  -RM          (STG) Swallow 1 The patient will participate in ongoing assessment of swallow, including re-evaluation clinically and/or including instrumental assessment of swallow if indicated, to further assess swallow function in anticipation to initiate a po diet  -RM    Time Frame (Swallow Short Term Goal 1) other (see comments)  2 days  -RM          User Key  (r) = Recorded By, (t) = Taken By, (c) = Cosigned By    Initials Name Provider Type    Hudson Braga, SLP Speech and Language Pathologist                   Time Calculation:       ALINA Lyman  2/16/2023

## 2023-02-17 ENCOUNTER — READMISSION MANAGEMENT (OUTPATIENT)
Dept: CALL CENTER | Facility: HOSPITAL | Age: 63
End: 2023-02-17
Payer: OTHER GOVERNMENT

## 2023-02-17 VITALS
SYSTOLIC BLOOD PRESSURE: 139 MMHG | WEIGHT: 249.12 LBS | TEMPERATURE: 97.6 F | OXYGEN SATURATION: 94 % | HEART RATE: 77 BPM | DIASTOLIC BLOOD PRESSURE: 67 MMHG | HEIGHT: 66 IN | BODY MASS INDEX: 40.04 KG/M2 | RESPIRATION RATE: 18 BRPM

## 2023-02-17 LAB
ANION GAP SERPL CALCULATED.3IONS-SCNC: 12 MMOL/L (ref 5–15)
BASOPHILS # BLD AUTO: 0.1 10*3/MM3 (ref 0–0.2)
BASOPHILS NFR BLD AUTO: 0.9 % (ref 0–1.5)
BUN SERPL-MCNC: 10 MG/DL (ref 8–23)
BUN/CREAT SERPL: 16.7 (ref 7–25)
CALCIUM SPEC-SCNC: 9.4 MG/DL (ref 8.6–10.5)
CHLORIDE SERPL-SCNC: 102 MMOL/L (ref 98–107)
CO2 SERPL-SCNC: 23 MMOL/L (ref 22–29)
CREAT SERPL-MCNC: 0.6 MG/DL (ref 0.57–1)
DEPRECATED RDW RBC AUTO: 45.1 FL (ref 37–54)
EGFRCR SERPLBLD CKD-EPI 2021: 102.3 ML/MIN/1.73
EOSINOPHIL # BLD AUTO: 0.3 10*3/MM3 (ref 0–0.4)
EOSINOPHIL NFR BLD AUTO: 2.4 % (ref 0.3–6.2)
ERYTHROCYTE [DISTWIDTH] IN BLOOD BY AUTOMATED COUNT: 16.5 % (ref 12.3–15.4)
GLUCOSE SERPL-MCNC: 129 MG/DL (ref 65–99)
HCT VFR BLD AUTO: 32.6 % (ref 34–46.6)
HGB BLD-MCNC: 10.2 G/DL (ref 12–15.9)
LYMPHOCYTES # BLD AUTO: 2.2 10*3/MM3 (ref 0.7–3.1)
LYMPHOCYTES NFR BLD AUTO: 18.5 % (ref 19.6–45.3)
MCH RBC QN AUTO: 24.2 PG (ref 26.6–33)
MCHC RBC AUTO-ENTMCNC: 31.1 G/DL (ref 31.5–35.7)
MCV RBC AUTO: 77.7 FL (ref 79–97)
MONOCYTES # BLD AUTO: 1.5 10*3/MM3 (ref 0.1–0.9)
MONOCYTES NFR BLD AUTO: 12.1 % (ref 5–12)
NEUTROPHILS NFR BLD AUTO: 66.1 % (ref 42.7–76)
NEUTROPHILS NFR BLD AUTO: 8 10*3/MM3 (ref 1.7–7)
NRBC BLD AUTO-RTO: 0 /100 WBC (ref 0–0.2)
PLATELET # BLD AUTO: 383 10*3/MM3 (ref 140–450)
PMV BLD AUTO: 7.2 FL (ref 6–12)
POTASSIUM SERPL-SCNC: 3.5 MMOL/L (ref 3.5–5.2)
QT INTERVAL: 349 MS
RBC # BLD AUTO: 4.2 10*6/MM3 (ref 3.77–5.28)
SODIUM SERPL-SCNC: 137 MMOL/L (ref 136–145)
WBC NRBC COR # BLD: 12.1 10*3/MM3 (ref 3.4–10.8)

## 2023-02-17 PROCEDURE — 94799 UNLISTED PULMONARY SVC/PX: CPT

## 2023-02-17 PROCEDURE — 94664 DEMO&/EVAL PT USE INHALER: CPT

## 2023-02-17 PROCEDURE — 85025 COMPLETE CBC W/AUTO DIFF WBC: CPT

## 2023-02-17 PROCEDURE — 25010000002 ENOXAPARIN PER 10 MG: Performed by: INTERNAL MEDICINE

## 2023-02-17 PROCEDURE — 80048 BASIC METABOLIC PNL TOTAL CA: CPT

## 2023-02-17 PROCEDURE — 96372 THER/PROPH/DIAG INJ SC/IM: CPT

## 2023-02-17 PROCEDURE — G0378 HOSPITAL OBSERVATION PER HR: HCPCS

## 2023-02-17 RX ORDER — CEFPODOXIME PROXETIL 200 MG/1
200 TABLET, FILM COATED ORAL EVERY 12 HOURS
Qty: 6 TABLET | Refills: 0 | Status: SHIPPED | OUTPATIENT
Start: 2023-02-17 | End: 2023-02-22

## 2023-02-17 RX ORDER — BENZONATATE 200 MG/1
200 CAPSULE ORAL 3 TIMES DAILY PRN
Qty: 45 CAPSULE | Refills: 0 | Status: SHIPPED | OUTPATIENT
Start: 2023-02-17 | End: 2023-03-04

## 2023-02-17 RX ADMIN — ENOXAPARIN SODIUM 40 MG: 100 INJECTION SUBCUTANEOUS at 09:28

## 2023-02-17 RX ADMIN — Medication 10 ML: at 09:28

## 2023-02-17 RX ADMIN — METOPROLOL TARTRATE 50 MG: 50 TABLET, FILM COATED ORAL at 09:28

## 2023-02-17 RX ADMIN — BUDESONIDE 1 MG: 0.5 INHALANT RESPIRATORY (INHALATION) at 08:44

## 2023-02-17 RX ADMIN — IPRATROPIUM BROMIDE AND ALBUTEROL SULFATE 3 ML: 2.5; .5 SOLUTION RESPIRATORY (INHALATION) at 08:40

## 2023-02-17 RX ADMIN — LISINOPRIL 40 MG: 20 TABLET ORAL at 09:28

## 2023-02-17 RX ADMIN — PANTOPRAZOLE SODIUM 40 MG: 40 TABLET, DELAYED RELEASE ORAL at 06:04

## 2023-02-17 RX ADMIN — CETIRIZINE HYDROCHLORIDE 10 MG: 10 TABLET, FILM COATED ORAL at 09:28

## 2023-02-17 NOTE — PLAN OF CARE
Goal Outcome Evaluation:                 Patient has discharge orders. IV's removed. Discharge instructions provided.

## 2023-02-17 NOTE — DISCHARGE SUMMARY
[facility]  DISCHARGE SUMMARY    Patient Name: Eve Garrett  : 1960  MRN: [id number]    Date of Admission: 2/15/2023  Discharge Diagnosis: Community-acquired pneumonia  Date of Discharge:  2023  Primary Care Physician: Nunu Kingston MD      Presenting Problem:   Shortness of breath [R06.02]  Pneumonia of left lower lobe due to infectious organism [J18.9]    Active and Resolved Hospital Problems:  Active Hospital Problems    Diagnosis POA   • **Pneumonia of left lower lobe due to infectious organism [J18.9] Yes      Resolved Hospital Problems   No resolved problems to display.         Hospital Course     Hospital Course:  Eve Garrett is a 62 y.o. female     Pneumonia of left lower lobe with concern for aspiration  Currently being evaluated by SLP underwent MBS now recommending regular textures and thin liquids  Pulmicort and DuoNeb ordered  Discharge on cefpodoxime 200 mg twice daily  Blood cultures no growth to date  Sputum cultures were rejected  S.pneumo antigen and Legionella antigen  were negative  CT chest showing multifocal pneumonia  Outpatient PCP follow-up     Hyperlipidemia  Continue home atorvastatin  Lipid panel from 2023 reviewed-HDL 39, LDL 79, triglycerides 167, total cholesterol 147     Hypertension  Continue home metoprolol, lisinopril  We will continue to monitor blood pressure     GERD  Continue home Protonix     Seasonal allergies  Continue home Zyrtec    Stable condition for discharge home        DISCHARGE Follow Up Recommendations for labs and diagnostics:   -Outpatient PCP follow-up in 1 to 2 weeks      Reasons For Change In Medications and Indications for New Medications:  -Cefpodoxime 2 mg twice daily for 6 days    Day of Discharge     Vital Signs:  Temp:  [97.6 °F (36.4 °C)-99.5 °F (37.5 °C)] 97.6 °F (36.4 °C)  Heart Rate:  [] 77  Resp:  [18-21] 18  BP: (114-157)/(58-79) 139/67    Physical Exam:  Physical  Exam   Constitutional:       General: She is not in acute distress.     Appearance: She is not ill-appearing.   HENT:      Mouth/Throat:      Mouth: Mucous membranes are moist.   Eyes:      Pupils: Pupils are equal, round, and reactive to light.   Cardiovascular:      Rate and Rhythm: Normal rate.   Pulmonary:      Effort: Pulmonary effort is normal. No respiratory distress.   Neurological:      General: No focal deficit present.      Mental Status: She is alert and oriented to person, place, and time.    Pertinent  and/or Most Recent Results     LAB RESULTS:      Lab 02/17/23  0344 02/16/23  0419 02/15/23  0941   WBC 12.10* 14.00* 18.70*   HEMOGLOBIN 10.2* 9.9* 10.9*   HEMATOCRIT 32.6* 32.5* 34.4   PLATELETS 383 350 376   NEUTROS ABS 8.00* 10.00* 15.50*   LYMPHS ABS 2.20 2.10 0.90   MONOS ABS 1.50* 1.60* 2.10*   EOS ABS 0.30 0.20 0.10   MCV 77.7* 78.0* 75.8*   LACTATE  --  1.1  --          Lab 02/17/23  0344 02/16/23  0419 02/15/23  0941   SODIUM 137 138 133*   POTASSIUM 3.5 3.5 3.7   CHLORIDE 102 102 100   CO2 23.0 21.0* 21.0*   ANION GAP 12.0 15.0 12.0   BUN 10 12 11   CREATININE 0.60 0.58 0.70   EGFR 102.3 103.1 98.5   GLUCOSE 129* 88 158*   CALCIUM 9.4 9.6 9.7   HEMOGLOBIN A1C  --  6.6*  --          Lab 02/15/23  0941   TOTAL PROTEIN 7.9   ALBUMIN 3.5   GLOBULIN 4.4   ALT (SGPT) 15   AST (SGOT) 14   BILIRUBIN 0.5   ALK PHOS 121*         Lab 02/15/23  1201 02/15/23  0941   PROBNP  --  59.2   HSTROP T 8 9             Lab 02/16/23 0419   IRON 22*   IRON SATURATION 7*   TIBC 299   TRANSFERRIN 201  201   FERRITIN 204.50*         Brief Urine Lab Results     None        Microbiology Results (last 10 days)     Procedure Component Value - Date/Time    Respiratory Culture - Sputum, Cough [[id number]] Collected: 02/16/23 0402    Lab Status: Final result Specimen: Sputum from Cough Updated: 02/16/23 0747     Respiratory Culture Rejected     Gram Stain Many (4+) Epithelial cells per low power field      Many (4+) Mixed  bacterial morphotypes seen on Gram Stain      Few (2+) WBCs per low power field    Narrative:      Specimen rejected due to oropharyngeal contamination. Please reorder and recollect specimen if clinically necessary.    S. Pneumo Ag Urine or CSF - Urine, Urine, Clean Catch [[id number]]  (Normal) Collected: 02/16/23 0401    Lab Status: Final result Specimen: Urine, Clean Catch Updated: 02/16/23 0709     Strep Pneumo Ag Negative    Legionella Antigen, Urine - Urine, Urine, Clean Catch [[id number]]  (Normal) Collected: 02/16/23 0401    Lab Status: Final result Specimen: Urine, Clean Catch Updated: 02/16/23 0709     LEGIONELLA ANTIGEN, URINE Negative    Blood Culture - Blood, Arm, Right [[id number]]  (Normal) Collected: 02/15/23 1248    Lab Status: Preliminary result Specimen: Blood from Arm, Right Updated: 02/16/23 1301     Blood Culture No growth at 24 hours    Blood Culture - Blood, Hand, Left [[id number]]  (Normal) Collected: 02/15/23 1248    Lab Status: Preliminary result Specimen: Blood from Hand, Left Updated: 02/16/23 1301     Blood Culture No growth at 24 hours    Narrative:      Less than seven (7) mL's of blood was collected.  Insufficient quantity may yield false negative results.    Respiratory Panel PCR w/COVID-19(SARS-CoV-2) [facility] In-House, NP Swab in UTM/VTM, 3-4 HR TAT - Swab, Nasopharynx [[id number]]  (Normal) Collected: 02/15/23 0941    Lab Status: Final result Specimen: Swab from Nasopharynx Updated: 02/15/23 1039     ADENOVIRUS, PCR Not Detected     Coronavirus 229E Not Detected     Coronavirus HKU1 Not Detected     Coronavirus NL63 Not Detected     Coronavirus OC43 Not Detected     COVID19 Not Detected     Human Metapneumovirus Not Detected     Human Rhinovirus/Enterovirus Not Detected     Influenza A PCR Not Detected     Influenza B PCR Not Detected     Parainfluenza Virus 1 Not Detected     Parainfluenza Virus 2 Not Detected     Parainfluenza Virus 3 Not Detected     Parainfluenza  Virus 4 Not Detected     RSV, PCR Not Detected     Bordetella pertussis pcr Not Detected     Bordetella parapertussis PCR Not Detected     Chlamydophila pneumoniae PCR Not Detected     Mycoplasma pneumo by PCR Not Detected    Narrative:      In the setting of a positive respiratory panel with a viral infection PLUS a negative procalcitonin without other underlying concern for bacterial infection, consider observing off antibiotics or discontinuation of antibiotics and continue supportive care. If the respiratory panel is positive for atypical bacterial infection (Bordetella pertussis, Chlamydophila pneumoniae, or Mycoplasma pneumoniae), consider antibiotic de-escalation to target atypical bacterial infection.    COVID-19,APTIMA PANTHER(ANGELO),[facility], NP/OP SWAB IN UTM/VTM/SALINE TRANSPORT MEDIA,24 HR TAT - Swab, Nasopharynx [[id number]]  (Normal) Collected: 02/09/23 0938    Lab Status: Final result Specimen: Swab from Nasopharynx Updated: 02/09/23 2255     COVID19 Not Detected    Narrative:      Fact sheet for providers: https://www.fda.gov/media/650141/download     Fact sheet for patients: https://www.fda.gov/media/208747/download    Test performed by RT PCR.          CT Chest Without Contrast Diagnostic    Result Date: 2/15/2023  Impression: Impression: Multifocal pneumonia with primary foci of pneumonia in the dependent lower lobes. Electronically Signed: Ye Valencia  2/15/2023 7:44 PM EST  Workstation ID: [id number]    FL Video Swallow With Speech Single Contrast    Result Date: 2/16/2023  Impression: Impression: See above. Electronically Signed: Julio Pereira  2/16/2023 2:17 PM EST  Workstation ID: [id number]    XR Chest 1 View    Result Date: 2/15/2023  Impression: New left basilar airspace opacities suspicious for pneumonia.  Electronically Signed: Jaylan Cisneros  2/15/2023 10:36 AM EST  Workstation ID: [id number]      Results for orders placed during the hospital encounter of 11/19/21    Duplex Carotid  Ultrasound CAR    Interpretation Summary  · Right internal carotid artery is normal.  · Left internal carotid artery is normal.      Results for orders placed during the hospital encounter of 11/19/21    Duplex Carotid Ultrasound CAR    Interpretation Summary  · Right internal carotid artery is normal.  · Left internal carotid artery is normal.          Labs Pending at Discharge:  Pending Labs     Order Current Status    Blood Culture - Blood, Arm, Right Preliminary result    Blood Culture - Blood, Hand, Left Preliminary result          Procedures Performed           Consults:   Consults     Date and Time Order Name Status Description    2/16/2023  5:24 AM Inpatient Pulmonology Consult Completed     2/15/2023 12:27 PM Hospitalist (on-call MD unless specified)              Discharge Details        Discharge Medications      New Medications      Instructions Start Date   benzonatate 200 MG capsule  Commonly known as: TESSALON   200 mg, Oral, 3 Times Daily PRN      cefpodoxime 200 MG tablet  Commonly known as: VANTIN   200 mg, Oral, Every 12 Hours         Continue These Medications      Instructions Start Date   atorvastatin 20 MG tablet  Commonly known as: LIPITOR   40 mg, Oral, Nightly      cetirizine 10 MG tablet  Commonly known as: zyrTEC   10 mg, Oral, Daily      fluticasone 50 MCG/ACT nasal spray  Commonly known as: FLONASE   2 sprays, Nasal, Daily      lisinopril 40 MG tablet  Commonly known as: PRINIVIL,ZESTRIL   40 mg, Oral, Daily      metoprolol tartrate 50 MG tablet  Commonly known as: LOPRESSOR   50 mg, Oral, 2 Times Daily      multivitamin tablet tablet   1 tablet, Oral, Daily      omeprazole 40 MG capsule  Commonly known as: priLOSEC   40 mg, Oral, Daily      Trelegy Ellipta 100-62.5-25 MCG/ACT inhaler  Generic drug: Fluticasone-Umeclidin-Vilant   1 puff, Inhalation, Daily - RT         Stop These Medications    albuterol sulfate  (90 Base) MCG/ACT inhaler  Commonly known as: PROVENTIL HFA;VENTOLIN  HFA;PROAIR HFA     amoxicillin 875 MG tablet  Commonly known as: AMOXIL     multivitamin with minerals tablet tablet            Allergies   Allergen Reactions   • Promethazine Anxiety and Hallucinations     AKA PHENERGAN, Delusions, hallucinations         Discharge Disposition: Home  Home or Self Care    Diet:  Hospital:  Diet Order   Procedures   • Diet: Regular/House Diet; Texture: Regular Texture (IDDSI 7); Fluid Consistency: Thin (IDDSI 0)         Discharge Activity:         CODE STATUS:  Code Status and Medical Interventions:   Ordered at: 02/15/23 1415     Level Of Support Discussed With:    Patient     Code Status (Patient has no pulse and is not breathing):    CPR (Attempt to Resuscitate)     Medical Interventions (Patient has pulse or is breathing):    Full Support         Future Appointments   Date Time Provider Department Center   2/21/2023  7:15 AM [facility]   5/10/2023 10:00 AM Sybil Dangelo APRN [facility]   8/1/2023  2:40 PM Andrade Oreilly MD [facility]           Time spent on Discharge including face to face service:  30 minutes        Signature: Electronically signed by Raudel Macdonald MD, 02/17/23, 9:30 AM EST.

## 2023-02-17 NOTE — PLAN OF CARE
Problem: Adult Inpatient Plan of Care  Goal: Plan of Care Review  Outcome: Ongoing, Progressing  Flowsheets (Taken 2/17/2023 0554)  Progress: no change  Plan of Care Reviewed With: patient  Goal: Patient-Specific Goal (Individualized)  Outcome: Ongoing, Progressing  Goal: Absence of Hospital-Acquired Illness or Injury  Outcome: Ongoing, Progressing  Intervention: Identify and Manage Fall Risk  Recent Flowsheet Documentation  Taken 2/17/2023 0400 by Alvaro Quinn RN  Safety Promotion/Fall Prevention: safety round/check completed  Taken 2/17/2023 0200 by Alvaro Quinn RN  Safety Promotion/Fall Prevention: safety round/check completed  Taken 2/17/2023 0000 by Alvaro Quinn RN  Safety Promotion/Fall Prevention: safety round/check completed  Taken 2/16/2023 2200 by Alvaro Quinn RN  Safety Promotion/Fall Prevention: safety round/check completed  Taken 2/16/2023 2000 by Alvaro Quinn RN  Safety Promotion/Fall Prevention: room organization consistent  Intervention: Prevent Skin Injury  Recent Flowsheet Documentation  Taken 2/16/2023 2000 by Alvaro Quinn RN  Body Position:   position changed independently   supine  Skin Protection: pectin skin barriers applied  Intervention: Prevent and Manage VTE (Venous Thromboembolism) Risk  Recent Flowsheet Documentation  Taken 2/16/2023 2000 by Alvaro Qiunn RN  VTE Prevention/Management: sequential compression devices off  Goal: Optimal Comfort and Wellbeing  Outcome: Ongoing, Progressing  Intervention: Monitor Pain and Promote Comfort  Recent Flowsheet Documentation  Taken 2/17/2023 0400 by Alvaro Quinn RN  Pain Management Interventions: see MAR  Taken 2/17/2023 0000 by Alvaro Quinn RN  Pain Management Interventions: see MAR  Taken 2/16/2023 2000 by Alvaro Quinn RN  Pain Management Interventions: position adjusted  Intervention: Provide Person-Centered Care  Recent Flowsheet Documentation  Taken 2/16/2023 2000 by Alvaro Quinn RN  Trust  Relationship/Rapport: thoughts/feelings acknowledged  Goal: Readiness for Transition of Care  Outcome: Ongoing, Progressing   Goal Outcome Evaluation:  Plan of Care Reviewed With: patient   Patient continues with severe non productive cough during the night, no other complaints      Progress: no change

## 2023-02-17 NOTE — OUTREACH NOTE
Prep Survey    Flowsheet Row Responses   [facility] patient discharged from? [facility]   Is LACE score < 7 ? Yes   Eligibility [facility]   Date of Admission 02/15/23   Date of Discharge 02/17/23   Discharge Disposition Home or Self Care   Discharge diagnosis LLL PNA   Does the patient have one of the following disease processes/diagnoses(primary or secondary)? Pneumonia   Does the patient have Home health ordered? No   Is there a DME ordered? No   Prep survey completed? Yes          Sabine KIRKLAND - Registered Nurse

## 2023-02-17 NOTE — PROGRESS NOTES
"PULMONARY CRITICAL CARE PROGRESS  NOTE      PATIENT IDENTIFICATION:  Name: Eve Garrett  MRN: [id number]  :  1960     Age: 62 y.o.  Sex: female    DATE OF Note:  2023   Referring Physician: Anne Rogers MD                  Subjective:   Feeling better, still some coughing, on room air, no SOB, no chest lower abdominal pain, no bowel or bladder issues reported    Objective:  tMax 24 hrs: Temp (24hrs), Av.7 °F (37.1 °C), Min:97.6 °F (36.4 °C), Max:99.5 °F (37.5 °C)      Vitals Ranges:   Temp:  [97.6 °F (36.4 °C)-99.5 °F (37.5 °C)] 97.6 °F (36.4 °C)  Heart Rate:  [] 77  Resp:  [18-21] 18  BP: (114-157)/(58-79) 139/67    Intake and Output Last 3 Shifts:   I/O last 3 completed shifts:  In: 1200 [P.O.:1200]  Out: -     Exam:  /67 (BP Location: Right arm, Patient Position: Lying)   Pulse 77   Temp 97.6 °F (36.4 °C) (Oral)   Resp 18   Ht 167.6 cm (66\")   Wt 113 kg (249 lb 1.9 oz)   SpO2 94%   BMI 40.21 kg/m²     General Appearance:   AAO  HEENT:  Normocephalic, without obvious abnormality. Conjunctivae/corneas clear.  Normal external ear canals. Nares normal, no drainage     Neck:  Supple, symmetrical, trachea midline. No JVD.  Lungs /Chest wall:   Bilateral basal rhonchi, respirations unlabored, symmetrical wall movement.     Heart:  Regular rate and rhythm, systolic murmur PMI left sternal border  Abdomen: Soft, nontender, no masses, no organomegaly.    Extremities: Trace edema, no clubbing or cyanosis        Medications:  atorvastatin, 40 mg, Oral, Nightly  budesonide, 1 mg, Nebulization, BID - RT  cefTRIAXone, 2 g, Intravenous, Q24H  cetirizine, 10 mg, Oral, Daily  enoxaparin, 40 mg, Subcutaneous, Q12H  ipratropium-albuterol, 3 mL, Nebulization, 4x Daily - RT  lisinopril, 40 mg, Oral, Daily  metoprolol tartrate, 50 mg, Oral, BID  pantoprazole, 40 mg, Oral, Q AM  sodium chloride, 10 mL, Intravenous, Q12H        Infusion:  Pharmacy to Dose enoxaparin (LOVENOX),          PRN:    " acetaminophen    benzonatate    melatonin    nitroglycerin    ondansetron    Pharmacy to Dose enoxaparin (LOVENOX)    sodium chloride    sodium chloride    sodium chloride  Data Review:  All labs (24hrs):   Recent Results (from the past 24 hour(s))   Basic Metabolic Panel    Collection Time: 02/17/23  3:44 AM    Specimen: Hand, Left; Blood   Result Value Ref Range    Glucose 129 (H) 65 - 99 mg/dL    BUN 10 8 - 23 mg/dL    Creatinine 0.60 0.57 - 1.00 mg/dL    Sodium 137 136 - 145 mmol/L    Potassium 3.5 3.5 - 5.2 mmol/L    Chloride 102 98 - 107 mmol/L    CO2 23.0 22.0 - 29.0 mmol/L    Calcium 9.4 8.6 - 10.5 mg/dL    BUN/Creatinine Ratio 16.7 7.0 - 25.0    Anion Gap 12.0 5.0 - 15.0 mmol/L    eGFR 102.3 >60.0 mL/min/1.73   CBC Auto Differential    Collection Time: 02/17/23  3:44 AM    Specimen: Hand, Left; Blood   Result Value Ref Range    WBC 12.10 (H) 3.40 - 10.80 10*3/mm3    RBC 4.20 3.77 - 5.28 10*6/mm3    Hemoglobin 10.2 (L) 12.0 - 15.9 g/dL    Hematocrit 32.6 (L) 34.0 - 46.6 %    MCV 77.7 (L) 79.0 - 97.0 fL    MCH 24.2 (L) 26.6 - 33.0 pg    MCHC 31.1 (L) 31.5 - 35.7 g/dL    RDW 16.5 (H) 12.3 - 15.4 %    RDW-SD 45.1 37.0 - 54.0 fl    MPV 7.2 6.0 - 12.0 fL    Platelets 383 140 - 450 10*3/mm3    Neutrophil % 66.1 42.7 - 76.0 %    Lymphocyte % 18.5 (L) 19.6 - 45.3 %    Monocyte % 12.1 (H) 5.0 - 12.0 %    Eosinophil % 2.4 0.3 - 6.2 %    Basophil % 0.9 0.0 - 1.5 %    Neutrophils, Absolute 8.00 (H) 1.70 - 7.00 10*3/mm3    Lymphocytes, Absolute 2.20 0.70 - 3.10 10*3/mm3    Monocytes, Absolute 1.50 (H) 0.10 - 0.90 10*3/mm3    Eosinophils, Absolute 0.30 0.00 - 0.40 10*3/mm3    Basophils, Absolute 0.10 0.00 - 0.20 10*3/mm3    nRBC 0.0 0.0 - 0.2 /100 WBC        Imaging:  FL Video Swallow With Speech Single Contrast  Narrative: FL VIDEO SWALLOW W SPEECH SINGLE-CONTRAST    Date of Exam: 2/16/2023 2:07 PM EST    Indication: dysphagia, pna.    Comparison: None available.    Technique:  This examination was performed in  conjunction with speech pathology. Lateral video fluoroscopic evaluation of the swallowing mechanism was performed while correlate administering to the patient various consistency food items mixed with   barium.    Fluoroscopic Time:  1.5 minutes    Number of Images: 8    Findings:  Patient handled all consistencies without tracheal aspiration or laryngeal penetration. Please see speech therapy report for additional findings and detail as well as feeding recommendations.  Impression: Impression:  See above.    Electronically Signed: Julio Pereira    2/16/2023 2:17 PM EST    Workstation ID: [id number]       ASSESSMENT:  Acute hypoxic respiratory failure  Pneumonia  RASHEEDA  Asthma  Chronic constipation  Depression  Hypothyroidism  Diverticulitis  GERD  Hyperlipidemia  Hypertension  Osteoarthritis              PLAN:  May discharge home with follow-up with our service next week  Antibiotics  Bronchodilators  Inhaled corticosteroids  Incentive spirometer  Will need OP workup for her RASHEEDA   Electrolytes/ glycemic control  DVT and GI prophylaxis-Lovenox     Discussed with Dr Dori Wood, CRUZ     2/17/2023  11:53 EST    I personally have examined  and interviewed the patient. I have reviewed the history, data, problems, assessment and plan with our NP.  Total Critical care time in direct medical management (   ) minutes, This time specifically excludes time spent performing procedures.    Nivia Meadows MD   2/17/2023  18:49 EST

## 2023-02-17 NOTE — CASE MANAGEMENT/SOCIAL WORK
Case Management Discharge Note      Final Note: Home    Provided Post Acute Provider List?: N/A  N/A Provider List Comment: Anticipate home    Selected Continued Care - Discharged on 2/17/2023 Admission date: 2/15/2023 - Discharge disposition: Home or Self Care            Transportation Services  Private: Car    Final Discharge Disposition Code: 01 - home or self-care

## 2023-02-20 ENCOUNTER — TRANSITIONAL CARE MANAGEMENT TELEPHONE ENCOUNTER (OUTPATIENT)
Dept: CALL CENTER | Facility: HOSPITAL | Age: 63
End: 2023-02-20
Payer: OTHER GOVERNMENT

## 2023-02-20 LAB
BACTERIA SPEC AEROBE CULT: NORMAL
BACTERIA SPEC AEROBE CULT: NORMAL

## 2023-02-20 NOTE — OUTREACH NOTE
Call Center TCM Note    Flowsheet Row Responses   [facility] patient discharged from? [facility]   Does the patient have one of the following disease processes/diagnoses(primary or secondary)? Pneumonia   TCM attempt successful? Yes   Call start time 1334   Call end time 1341   Discharge diagnosis LLL PNA   Person spoke with today (if not patient) and relationship Patient   Meds reviewed with patient/caregiver? Yes   Does the patient have all medications ordered at discharge? Yes   Is the patient taking all medications as directed (includes completed medication regime)? Yes   Comments PCP Dr Kingston. HOSPITAL FOLLOW UP scheduled for 2/22/2023  8:00 AM with Klaudia ARROYO. Patient preferred an early morning appt.    Does the patient have an appointment with their PCP within 7 days of discharge? No   Nursing Interventions Assisted patient with making appointment per protocol   Has home health visited the patient within 72 hours of discharge? N/A   Pulse Ox monitoring None   Psychosocial issues? No   Comments Using incentive spirometer.    Did the patient receive a copy of their discharge instructions? Yes   Nursing interventions Reviewed instructions with patient   What is the patient's perception of their health status since discharge? Improving   If the patient is a current smoker, are they able to teach back resources for cessation? Not a smoker   Is the patient/caregiver able to teach back the hierarchy of who to call/visit for symptoms/problems? PCP, Specialist, Home health nurse, Urgent Care, ED, 911 Yes   Is the patient/caregiver able to teach back signs and symptoms of worsening condition: Fever/chills, Shortness of breath, Chest pain   Is the patient/caregiver able to teach back importance of completing antibiotic course of treatment? Yes   TCM call completed? Yes   Call end time 1341   Would this patient benefit from a Referral to [facility] Social Work? No   Is the patient interested in additional calls from  an ambulatory ?  NOTE:  applies to high risk patients requiring additional follow-up. No          Jessica Reyez RN    2/20/2023, 13:42 EST

## 2023-02-21 ENCOUNTER — HOSPITAL ENCOUNTER (OUTPATIENT)
Dept: CARDIOLOGY | Facility: HOSPITAL | Age: 63
Discharge: HOME | End: 2023-02-21
Admitting: INTERNAL MEDICINE
Payer: OTHER GOVERNMENT

## 2023-02-21 VITALS — HEIGHT: 66 IN | BODY MASS INDEX: 40.02 KG/M2 | WEIGHT: 249 LBS

## 2023-02-21 DIAGNOSIS — G47.33 OBSTRUCTIVE SLEEP APNEA: ICD-10-CM

## 2023-02-21 DIAGNOSIS — E78.00 PURE HYPERCHOLESTEROLEMIA: ICD-10-CM

## 2023-02-21 DIAGNOSIS — I10 PRIMARY HYPERTENSION: ICD-10-CM

## 2023-02-21 DIAGNOSIS — R06.02 SHORTNESS OF BREATH: ICD-10-CM

## 2023-02-21 LAB
BH CV ECHO MEAS - ACS: 1.85 CM
BH CV ECHO MEAS - AO MAX PG: 7.7 MMHG
BH CV ECHO MEAS - AO MEAN PG: 4.5 MMHG
BH CV ECHO MEAS - AO ROOT DIAM: 2.6 CM
BH CV ECHO MEAS - AO V2 MAX: 138.7 CM/SEC
BH CV ECHO MEAS - AO V2 VTI: 31.2 CM
BH CV ECHO MEAS - AVA(I,D): 1.43 CM2
BH CV ECHO MEAS - EDV(CUBED): 99.6 ML
BH CV ECHO MEAS - EDV(MOD-SP4): 51.9 ML
BH CV ECHO MEAS - EF(MOD-SP4): 62.7 %
BH CV ECHO MEAS - ESV(CUBED): 11.8 ML
BH CV ECHO MEAS - ESV(MOD-SP4): 19.4 ML
BH CV ECHO MEAS - FS: 50.9 %
BH CV ECHO MEAS - IVS/LVPW: 0.94 CM
BH CV ECHO MEAS - IVSD: 1.07 CM
BH CV ECHO MEAS - LA DIMENSION: 4.5 CM
BH CV ECHO MEAS - LV DIASTOLIC VOL/BSA (35-75): 23.7 CM2
BH CV ECHO MEAS - LV MASS(C)D: 183.4 GRAMS
BH CV ECHO MEAS - LV MAX PG: 2.6 MMHG
BH CV ECHO MEAS - LV MEAN PG: 1.42 MMHG
BH CV ECHO MEAS - LV SYSTOLIC VOL/BSA (12-30): 8.8 CM2
BH CV ECHO MEAS - LV V1 MAX: 80 CM/SEC
BH CV ECHO MEAS - LV V1 VTI: 15.2 CM
BH CV ECHO MEAS - LVIDD: 4.6 CM
BH CV ECHO MEAS - LVIDS: 2.28 CM
BH CV ECHO MEAS - LVOT AREA: 2.9 CM2
BH CV ECHO MEAS - LVOT DIAM: 1.94 CM
BH CV ECHO MEAS - LVPWD: 1.13 CM
BH CV ECHO MEAS - MV A MAX VEL: 80.2 CM/SEC
BH CV ECHO MEAS - MV DEC SLOPE: 575.9 CM/SEC2
BH CV ECHO MEAS - MV DEC TIME: 0.17 MSEC
BH CV ECHO MEAS - MV E MAX VEL: 98.8 CM/SEC
BH CV ECHO MEAS - MV E/A: 1.23
BH CV ECHO MEAS - MV MAX PG: 3.7 MMHG
BH CV ECHO MEAS - MV MEAN PG: 1.97 MMHG
BH CV ECHO MEAS - MV V2 VTI: 23.4 CM
BH CV ECHO MEAS - MVA(VTI): 1.91 CM2
BH CV ECHO MEAS - PA ACC TIME: 0.12 SEC
BH CV ECHO MEAS - PA PR(ACCEL): 26.1 MMHG
BH CV ECHO MEAS - PA V2 MAX: 132.7 CM/SEC
BH CV ECHO MEAS - PULM A REVS VEL: 20.8 CM/SEC
BH CV ECHO MEAS - PULM DIAS VEL: 57.5 CM/SEC
BH CV ECHO MEAS - PULM S/D: 0.8
BH CV ECHO MEAS - PULM SYS VEL: 45.8 CM/SEC
BH CV ECHO MEAS - RV MAX PG: 3.1 MMHG
BH CV ECHO MEAS - RV V1 MAX: 87.8 CM/SEC
BH CV ECHO MEAS - RV V1 VTI: 18.2 CM
BH CV ECHO MEAS - RVDD: 3 CM
BH CV ECHO MEAS - SI(MOD-SP4): 14.8 ML/M2
BH CV ECHO MEAS - SV(LVOT): 44.8 ML
BH CV ECHO MEAS - SV(MOD-SP4): 32.5 ML
MAXIMAL PREDICTED HEART RATE: 159 BPM
STRESS TARGET HR: 135 BPM

## 2023-02-21 PROCEDURE — 25010000002 SULFUR HEXAFLUORIDE MICROSPH 60.7-25 MG RECONSTITUTED SUSPENSION: Performed by: INTERNAL MEDICINE

## 2023-02-21 PROCEDURE — 93306 TTE W/DOPPLER COMPLETE: CPT | Performed by: INTERNAL MEDICINE

## 2023-02-21 PROCEDURE — 93306 TTE W/DOPPLER COMPLETE: CPT

## 2023-02-21 RX ADMIN — SULFUR HEXAFLUORIDE 5 ML: KIT at 07:49

## 2023-02-22 ENCOUNTER — PATIENT MESSAGE (OUTPATIENT)
Dept: FAMILY MEDICINE CLINIC | Facility: CLINIC | Age: 63
End: 2023-02-22
Payer: OTHER GOVERNMENT

## 2023-02-22 ENCOUNTER — OFFICE VISIT (OUTPATIENT)
Dept: FAMILY MEDICINE CLINIC | Facility: CLINIC | Age: 63
End: 2023-02-22
Payer: OTHER GOVERNMENT

## 2023-02-22 VITALS
OXYGEN SATURATION: 95 % | TEMPERATURE: 97 F | WEIGHT: 252.6 LBS | BODY MASS INDEX: 40.6 KG/M2 | HEART RATE: 70 BPM | SYSTOLIC BLOOD PRESSURE: 138 MMHG | DIASTOLIC BLOOD PRESSURE: 78 MMHG | HEIGHT: 66 IN

## 2023-02-22 DIAGNOSIS — J45.909 ASTHMA, UNSPECIFIED ASTHMA SEVERITY, UNSPECIFIED WHETHER COMPLICATED, UNSPECIFIED WHETHER PERSISTENT: ICD-10-CM

## 2023-02-22 DIAGNOSIS — E78.00 PURE HYPERCHOLESTEROLEMIA: ICD-10-CM

## 2023-02-22 DIAGNOSIS — J18.9 PNEUMONIA OF LEFT LOWER LOBE DUE TO INFECTIOUS ORGANISM: Primary | ICD-10-CM

## 2023-02-22 DIAGNOSIS — I10 ESSENTIAL HYPERTENSION: ICD-10-CM

## 2023-02-22 DIAGNOSIS — J30.2 SEASONAL ALLERGIES: ICD-10-CM

## 2023-02-22 PROCEDURE — 99495 TRANSJ CARE MGMT MOD F2F 14D: CPT | Performed by: NURSE PRACTITIONER

## 2023-02-22 RX ORDER — ATORVASTATIN CALCIUM 20 MG/1
40 TABLET, FILM COATED ORAL NIGHTLY
Qty: 90 TABLET | Refills: 3 | Status: SHIPPED | OUTPATIENT
Start: 2023-02-22

## 2023-02-22 RX ORDER — OMEPRAZOLE 40 MG/1
40 CAPSULE, DELAYED RELEASE ORAL DAILY
Qty: 90 CAPSULE | Refills: 1 | Status: SHIPPED | OUTPATIENT
Start: 2023-02-22

## 2023-02-22 RX ORDER — FLUTICASONE FUROATE, UMECLIDINIUM BROMIDE AND VILANTEROL TRIFENATATE 100; 62.5; 25 UG/1; UG/1; UG/1
1 POWDER RESPIRATORY (INHALATION)
Qty: 3 EACH | Refills: 3 | Status: SHIPPED | OUTPATIENT
Start: 2023-02-22

## 2023-02-22 RX ORDER — FLUTICASONE PROPIONATE 50 MCG
2 SPRAY, SUSPENSION (ML) NASAL DAILY
Qty: 3 G | Refills: 3 | Status: SHIPPED | OUTPATIENT
Start: 2023-02-22

## 2023-02-22 RX ORDER — ALBUTEROL SULFATE 90 UG/1
2 AEROSOL, METERED RESPIRATORY (INHALATION) EVERY 4 HOURS PRN
Qty: 18 G | Refills: 6 | Status: SHIPPED | OUTPATIENT
Start: 2023-02-22

## 2023-02-22 RX ORDER — LISINOPRIL 40 MG/1
40 TABLET ORAL DAILY
Qty: 90 TABLET | Refills: 3 | Status: SHIPPED | OUTPATIENT
Start: 2023-02-22

## 2023-02-22 RX ORDER — METOPROLOL TARTRATE 50 MG/1
50 TABLET, FILM COATED ORAL 2 TIMES DAILY
Qty: 180 TABLET | Refills: 3 | Status: SHIPPED | OUTPATIENT
Start: 2023-02-22

## 2023-02-22 NOTE — PROGRESS NOTES
"Transitional Care Follow Up Visit  Subjective     Eve Garrett is a 62 y.o. female who presents for a transitional care management visit.    Within 48 business hours after discharge our office contacted her via telephone to coordinate her care and needs.      I reviewed and discussed the details of that call along with the discharge summary, hospital problems, inpatient lab results, inpatient diagnostic studies, and consultation reports with Eve.     Vitals:    02/22/23 0808 02/22/23 0834   BP: 152/85 138/78   BP Location: Right arm Left arm   Patient Position: Sitting Sitting   Cuff Size: Adult    Pulse: 70    Temp: 97 °F (36.1 °C)    TempSrc: Temporal    SpO2: 95%    Weight: 115 kg (252 lb 9.6 oz)    Height: 167.6 cm (66\")      Body mass index is 40.77 kg/m².    Current outpatient and discharge medications have been reconciled for the patient.    Date of TCM Phone Call 2/17/2023   Hospital [facility]   Date of Admission 2/15/2023   Date of Discharge 2/17/2023   Discharge Disposition Home or Self Care     Risk for Readmission (LACE) Score: 4 (2/17/2023  6:00 AM)      History of Present Illness   Course During Hospital Stay:  Pt present to follow up pneumonia.  She states she is still having some trouble breathing at times, but is using incentive spirometer as directed with no problems.  She states that she was previously using a CPAP as directed but it broke when they were on vacation.  Patient instructed on the benefit of using a CPAP and encouraged her to follow-up with her pulmonologist for either a new one or repair of her current machine.  She states she had a swallowing study due to coughing after eating during hospitalization.  Findings were normal and she has returned to a normal diet and states her cough is improving.  She request refills of current medications to be printed to take to [facility].     The following portions of the patient's history were reviewed and updated as appropriate: allergies, current " medications, past family history, past medical history, past social history, past surgical history and problem list.    Review of Systems   Respiratory: Positive for cough and shortness of breath.        Objective   Physical Exam  Vitals and nursing note reviewed.   Constitutional:       Appearance: Normal appearance. She is well-developed. She is obese.   HENT:      Head: Normocephalic and atraumatic.      Right Ear: External ear normal.      Left Ear: External ear normal.      Nose: Nose normal.   Eyes:      Extraocular Movements: Extraocular movements intact.      Pupils: Pupils are equal, round, and reactive to light.   Cardiovascular:      Rate and Rhythm: Normal rate and regular rhythm.      Pulses: Normal pulses.      Heart sounds: Normal heart sounds.   Pulmonary:      Effort: Pulmonary effort is normal.      Breath sounds: Examination of the right-lower field reveals decreased breath sounds. Examination of the left-lower field reveals decreased breath sounds. Decreased breath sounds present.   Abdominal:      General: Bowel sounds are normal.      Palpations: Abdomen is soft.   Genitourinary:     Vagina: Normal.   Musculoskeletal:         General: Normal range of motion.      Cervical back: Normal range of motion and neck supple.   Skin:     General: Skin is warm and dry.   Neurological:      General: No focal deficit present.      Mental Status: She is alert and oriented to person, place, and time.   Psychiatric:         Mood and Affect: Mood normal.         Behavior: Behavior normal.         Judgment: Judgment normal.         Assessment & Plan   Diagnoses and all orders for this visit:    1. Pneumonia of left lower lobe due to infectious organism (Primary)  Comments:  improving, continue use of incentive spriometer, f/u pulmonology and call if problems getting new cpap    2. Seasonal allergies  -     fluticasone (FLONASE) 50 MCG/ACT nasal spray; 2 sprays into the nostril(s) as directed by provider Daily.   Dispense: 3 g; Refill: 3    3. Essential hypertension  Comments:  Improved on recheck, metoprolol and lisinopril refilled    4. Pure hypercholesterolemia  Comments:  lipitor refilled    5. Asthma, unspecified asthma severity, unspecified whether complicated, unspecified whether persistent  Comments:  Trelegy refilled    Other orders  -     atorvastatin (LIPITOR) 20 MG tablet; Take 2 tablets by mouth Every Night.  Dispense: 90 tablet; Refill: 3  -     metoprolol tartrate (LOPRESSOR) 50 MG tablet; Take 1 tablet by mouth 2 (Two) Times a Day.  Dispense: 180 tablet; Refill: 3  -     omeprazole (priLOSEC) 40 MG capsule; Take 1 capsule by mouth Daily.  Dispense: 90 capsule; Refill: 1  -     lisinopril (PRINIVIL,ZESTRIL) 40 MG tablet; Take 1 tablet by mouth Daily.  Dispense: 90 tablet; Refill: 3  -     Fluticasone-Umeclidin-Vilant (Trelegy Ellipta) 100-62.5-25 MCG/ACT inhaler; Inhale 1 puff Daily.  Dispense: 3 each; Refill: 3  -     albuterol sulfate  (90 Base) MCG/ACT inhaler; Inhale 2 puffs Every 4 (Four) Hours As Needed for Wheezing.  Dispense: 18 g; Refill: 6        There are no Patient Instructions on file for this visit.

## 2023-02-24 NOTE — TELEPHONE ENCOUNTER
"  Benitez Mcdonald MD  [facility]    Caterina MOORE is a 67 

## 2023-02-27 ENCOUNTER — TRANSCRIBE ORDERS (OUTPATIENT)
Dept: ADMINISTRATIVE | Facility: HOSPITAL | Age: 63
End: 2023-02-27
Payer: OTHER GOVERNMENT

## 2023-02-27 DIAGNOSIS — C18.1 CANCER OF APPENDIX: Primary | ICD-10-CM

## 2023-03-21 ENCOUNTER — HOSPITAL ENCOUNTER (OUTPATIENT)
Dept: CT IMAGING | Facility: HOSPITAL | Age: 63
Discharge: HOME | End: 2023-03-21
Admitting: INTERNAL MEDICINE
Payer: OTHER GOVERNMENT

## 2023-03-21 DIAGNOSIS — C18.1 CANCER OF APPENDIX: ICD-10-CM

## 2023-03-21 LAB
CREAT BLDA-MCNC: 0.7 MG/DL (ref 0.6–1.3)
EGFRCR SERPLBLD CKD-EPI 2021: 98.5 ML/MIN/1.73

## 2023-03-21 PROCEDURE — 82565 ASSAY OF CREATININE: CPT

## 2023-03-21 PROCEDURE — 74177 CT ABD & PELVIS W/CONTRAST: CPT

## 2023-03-21 PROCEDURE — 25510000001 IOPAMIDOL PER 1 ML: Performed by: INTERNAL MEDICINE

## 2023-03-21 RX ADMIN — IOPAMIDOL 100 ML: 755 INJECTION, SOLUTION INTRAVENOUS at 12:48

## 2023-03-23 ENCOUNTER — TELEPHONE (OUTPATIENT)
Dept: FAMILY MEDICINE CLINIC | Facility: CLINIC | Age: 63
End: 2023-03-23
Payer: OTHER GOVERNMENT

## 2023-05-10 ENCOUNTER — OFFICE VISIT (OUTPATIENT)
Dept: FAMILY MEDICINE CLINIC | Facility: CLINIC | Age: 63
End: 2023-05-10
Payer: OTHER GOVERNMENT

## 2023-05-10 VITALS
HEIGHT: 66 IN | SYSTOLIC BLOOD PRESSURE: 158 MMHG | BODY MASS INDEX: 41.95 KG/M2 | OXYGEN SATURATION: 95 % | HEART RATE: 73 BPM | WEIGHT: 261 LBS | DIASTOLIC BLOOD PRESSURE: 79 MMHG

## 2023-05-10 DIAGNOSIS — J06.9 UPPER RESPIRATORY TRACT INFECTION, UNSPECIFIED TYPE: Primary | ICD-10-CM

## 2023-05-10 DIAGNOSIS — J30.89 ENVIRONMENTAL AND SEASONAL ALLERGIES: ICD-10-CM

## 2023-05-10 DIAGNOSIS — J45.30 MILD PERSISTENT ASTHMA WITHOUT COMPLICATION: ICD-10-CM

## 2023-05-10 PROCEDURE — 99214 OFFICE O/P EST MOD 30 MIN: CPT | Performed by: NURSE PRACTITIONER

## 2023-05-10 RX ORDER — LANOLIN ALCOHOL/MO/W.PET/CERES
1 CREAM (GRAM) TOPICAL DAILY
COMMUNITY
Start: 2023-03-26

## 2023-05-10 RX ORDER — GUAIFENESIN AND CODEINE PHOSPHATE 100; 10 MG/5ML; MG/5ML
5 SOLUTION ORAL 3 TIMES DAILY PRN
Qty: 180 ML | Refills: 0 | Status: SHIPPED | OUTPATIENT
Start: 2023-05-10

## 2023-05-10 RX ORDER — CEFDINIR 300 MG/1
300 CAPSULE ORAL 2 TIMES DAILY
Qty: 20 CAPSULE | Refills: 0 | Status: SHIPPED | OUTPATIENT
Start: 2023-05-10 | End: 2023-05-20

## 2023-05-10 NOTE — PROGRESS NOTES
Subjective   Eve Garrett is a 62 y.o. female.       HPI   Pt is new to our office today.   Previous PCP was Dr. Hodges.   Medical/social/family hx reviewed.   1) HTN - currently on lisinopril 40 mg daily; metoprolol 50 mg bid.  Denies any CP; palpitations.    2) Hyperlipidemia - currently on atorvastatin 40 mg daily.   3) Asthma - currently on Trelegy inhaler; albuterol PRN.  Has had recent cough; worse at night.  Denies any wheezes or SOA.    4) GERD - currently on omeprazole 40 mg daily.   5) Anemia - currently on ferrous sulfate daily.  Being followed by hematology.    6) Allergies - currently on cetrizine and Flonase daily.  Symptoms fairly stable; some increase in head congestion over the past week.  Ears have felt full at times.  No sore throat or fevers.    6) Here today with concern of a cough.  In hospital with pneumonia in left leg in Feb.  Cough started again a week ago.  Worse at night.  Drainage has been greenish.  No fevers. Not taking anything otc for cough.      The following portions of the patient's history were reviewed and updated as appropriate: allergies, current medications, past family history, past medical history, past social history, past surgical history and problem list.    Review of Systems   Constitutional: Negative for chills, fatigue and fever.   HENT: Positive for congestion and sinus pressure. Negative for ear discharge, ear pain, postnasal drip, rhinorrhea, sore throat and swollen glands.    Eyes: Negative for visual disturbance.   Respiratory: Positive for cough. Negative for shortness of breath and wheezing.    Cardiovascular: Negative for chest pain and palpitations.   Gastrointestinal: Negative for diarrhea, nausea and vomiting.   Genitourinary: Negative for dysuria, frequency, hematuria and urgency.   Neurological: Negative for dizziness, weakness, light-headedness and headache.   Psychiatric/Behavioral: Negative for depressed mood. The patient is not nervous/anxious.         Objective   Physical Exam  Vitals reviewed.   Constitutional:       General: She is not in acute distress.     Appearance: Normal appearance. She is obese.   HENT:      Head: Normocephalic and atraumatic.      Right Ear: Hearing, tympanic membrane, ear canal and external ear normal.      Left Ear: Hearing, tympanic membrane, ear canal and external ear normal.      Nose: Rhinorrhea present.      Right Sinus: No frontal sinus tenderness.      Left Sinus: No frontal sinus tenderness.      Mouth/Throat:      Lips: Pink.      Mouth: Mucous membranes are moist.      Pharynx: Posterior oropharyngeal erythema present. No pharyngeal swelling, oropharyngeal exudate or uvula swelling.      Comments: Mild postnasal drip  Cardiovascular:      Rate and Rhythm: Normal rate and regular rhythm.      Pulses: Normal pulses.      Heart sounds: Normal heart sounds. No murmur heard.  Pulmonary:      Effort: Pulmonary effort is normal. No respiratory distress.      Breath sounds: Normal breath sounds. No wheezing, rhonchi or rales.   Chest:      Chest wall: No tenderness.   Abdominal:      Tenderness: There is no right CVA tenderness or left CVA tenderness.   Skin:     General: Skin is warm and dry.      Findings: No erythema.   Neurological:      General: No focal deficit present.      Mental Status: She is alert and oriented to person, place, and time.   Psychiatric:         Mood and Affect: Mood normal.           Assessment & Plan   Diagnoses and all orders for this visit:    1. Upper respiratory tract infection, unspecified type (Primary)  Comments:  Given cefdinir.   Cough syrup with codeine PRN.   Increase fluids and rest.   Call for worsening.   Orders:  -     cefdinir (OMNICEF) 300 MG capsule; Take 1 capsule by mouth 2 (Two) Times a Day for 10 days.  Dispense: 20 capsule; Refill: 0  -     guaiFENesin-codeine (GUAIFENESIN AC) 100-10 MG/5ML liquid; Take 5 mL by mouth 3 (Three) Times a Day As Needed for Cough or Congestion.   Dispense: 180 mL; Refill: 0    2. Mild persistent asthma without complication  Comments:  Stable.   Cont. current medication.   RTO in 6 mo.     3. Environmental and seasonal allergies  Comments:  Stable.   Cont. current medication.   RTO in 6 mo.

## 2023-05-30 ENCOUNTER — PATIENT ROUNDING (BHMG ONLY) (OUTPATIENT)
Dept: FAMILY MEDICINE CLINIC | Facility: CLINIC | Age: 63
End: 2023-05-30

## 2023-05-30 NOTE — PROGRESS NOTES
A My-Chart message has been sent to the patient for PATIENT ROUNDING with [facility] – [place], [place].

## 2023-07-25 ENCOUNTER — HOSPITAL ENCOUNTER (OUTPATIENT)
Dept: SLEEP MEDICINE | Facility: HOSPITAL | Age: 63
Discharge: HOME | End: 2023-07-25
Admitting: INTERNAL MEDICINE
Payer: OTHER GOVERNMENT

## 2023-07-25 DIAGNOSIS — G47.30 OBSERVED SLEEP APNEA: ICD-10-CM

## 2023-07-25 PROCEDURE — 95806 SLEEP STUDY UNATT&RESP EFFT: CPT

## 2023-08-01 ENCOUNTER — OFFICE VISIT (OUTPATIENT)
Dept: CARDIOLOGY | Facility: CLINIC | Age: 63
End: 2023-08-01
Payer: OTHER GOVERNMENT

## 2023-08-01 VITALS
BODY MASS INDEX: 42.27 KG/M2 | HEART RATE: 72 BPM | DIASTOLIC BLOOD PRESSURE: 81 MMHG | HEIGHT: 66 IN | OXYGEN SATURATION: 96 % | WEIGHT: 263 LBS | SYSTOLIC BLOOD PRESSURE: 139 MMHG

## 2023-08-01 DIAGNOSIS — R55 SYNCOPE AND COLLAPSE: ICD-10-CM

## 2023-08-01 DIAGNOSIS — I10 PRIMARY HYPERTENSION: Primary | ICD-10-CM

## 2023-08-01 DIAGNOSIS — R00.2 PALPITATIONS: ICD-10-CM

## 2023-08-01 DIAGNOSIS — E78.00 PURE HYPERCHOLESTEROLEMIA: ICD-10-CM

## 2023-08-01 DIAGNOSIS — G47.33 OBSTRUCTIVE SLEEP APNEA: ICD-10-CM

## 2023-08-01 PROCEDURE — 99214 OFFICE O/P EST MOD 30 MIN: CPT | Performed by: INTERNAL MEDICINE

## 2023-08-03 ENCOUNTER — TELEPHONE (OUTPATIENT)
Dept: FAMILY MEDICINE CLINIC | Facility: CLINIC | Age: 63
End: 2023-08-03
Payer: OTHER GOVERNMENT

## 2023-08-03 NOTE — TELEPHONE ENCOUNTER
[facility] called in regards to the patient. The woman I spoke with said that a  referral needs to be completed to schedule. Please advise. Thank you.

## 2023-08-08 DIAGNOSIS — G47.33 OSA (OBSTRUCTIVE SLEEP APNEA): Primary | ICD-10-CM

## 2023-08-31 RX ORDER — OMEPRAZOLE 40 MG/1
40 CAPSULE, DELAYED RELEASE ORAL DAILY
Qty: 90 CAPSULE | Refills: 1 | Status: SHIPPED | OUTPATIENT
Start: 2023-08-31 | End: 2023-09-01 | Stop reason: SDUPTHER

## 2023-08-31 RX ORDER — ATORVASTATIN CALCIUM 20 MG/1
40 TABLET, FILM COATED ORAL NIGHTLY
Qty: 90 TABLET | Refills: 3 | Status: SHIPPED | OUTPATIENT
Start: 2023-08-31 | End: 2023-09-01 | Stop reason: SDUPTHER

## 2023-08-31 NOTE — TELEPHONE ENCOUNTER
Caller: Eve Garrett    Relationship: Self    Best call back number:     [phone]       Requested Prescriptions:   Requested Prescriptions     Pending Prescriptions Disp Refills    omeprazole (priLOSEC) 40 MG capsule 90 capsule 1     Sig: Take 1 capsule by mouth Daily.    atorvastatin (LIPITOR) 20 MG tablet 90 tablet 3     Sig: Take 2 tablets by mouth Every Night.        Pharmacy where request should be sent: [place] [phone] [facility] [phone]      Last office visit with prescribing clinician: 5/10/2023   Last telemedicine visit with prescribing clinician: Visit date not found   Next office visit with prescribing clinician: 11/13/2023     Additional details provided by patient: PATIENT HAS 1 DAY SUPPLY    Does the patient have less than a 3 day supply:  [x] Yes  [] No    Would you like a call back once the refill request has been completed: [] Yes [x] No    If the office needs to give you a call back, can they leave a voicemail: [] Yes [x] No    Joy Scott Rep   08/31/23 09:38 EDT

## 2023-09-01 RX ORDER — OMEPRAZOLE 40 MG/1
40 CAPSULE, DELAYED RELEASE ORAL DAILY
Qty: 90 CAPSULE | Refills: 3 | Status: SHIPPED | OUTPATIENT
Start: 2023-09-01

## 2023-09-01 RX ORDER — ATORVASTATIN CALCIUM 20 MG/1
40 TABLET, FILM COATED ORAL NIGHTLY
Qty: 90 TABLET | Refills: 3 | Status: SHIPPED | OUTPATIENT
Start: 2023-09-01

## 2023-09-18 ENCOUNTER — TELEPHONE (OUTPATIENT)
Dept: FAMILY MEDICINE CLINIC | Facility: CLINIC | Age: 63
End: 2023-09-18

## 2023-09-18 NOTE — TELEPHONE ENCOUNTER
"    Caller: Eve Garrett    Relationship to patient: Self    Best call back number: [phone]     Chief complaint: COUGH, EAR ACHE, CHEST PAIN \"ELEPHANT SITTING ON CHEST\"    Patient directed to call 911 or go to their nearest emergency room.     Patient verbalized understanding: [x] Yes  [] No  If no, why?    Additional notes:    WAS AT [facility]RGENT [facility]ARE LAST WEEK AND WAS DIAGNOSED WITH A DOUBLE EAR INFECTION, AND BRONCHITIS.    HAS NOT GOTTEN BETTER AND WANTED TO SEE KINA OWENS.     ADVISED TO GO TO ER.     "

## 2023-09-22 ENCOUNTER — OFFICE VISIT (OUTPATIENT)
Dept: FAMILY MEDICINE CLINIC | Facility: CLINIC | Age: 63
End: 2023-09-22
Payer: OTHER GOVERNMENT

## 2023-09-22 VITALS
HEART RATE: 69 BPM | TEMPERATURE: 98.4 F | BODY MASS INDEX: 41.97 KG/M2 | DIASTOLIC BLOOD PRESSURE: 81 MMHG | OXYGEN SATURATION: 96 % | WEIGHT: 260 LBS | SYSTOLIC BLOOD PRESSURE: 149 MMHG

## 2023-09-22 DIAGNOSIS — H93.8X3 SENSATION OF FULLNESS IN BOTH EARS: Primary | ICD-10-CM

## 2023-09-22 PROCEDURE — 99213 OFFICE O/P EST LOW 20 MIN: CPT | Performed by: NURSE PRACTITIONER

## 2023-09-22 NOTE — PROGRESS NOTES
Subjective     Eve Garrett is a 63 y.o. female.     History of Present Illness  Pt is here today with c/o ear pain/fullness.   [place] [facility] [place] was told she had natty ear infection and bronchitis  Was put on abx and steroid  The pain has improved some but she is still have natty ear fullness  Denies fever or chills  She is still coughing when she lays down.   She uses bee-selzer and flonase     The following portions of the patient's history were reviewed and updated as appropriate: allergies, current medications, past family history, past medical history, past social history, past surgical history, and problem list.    Review of Systems   Constitutional:  Negative for chills, fatigue and fever.   HENT:  Positive for congestion, ear pain and sinus pressure. Negative for sore throat.    Respiratory:  Negative for chest tightness and shortness of breath.    Cardiovascular:  Negative for chest pain and palpitations.   Neurological:  Positive for headache. Negative for dizziness.     Objective     /81 (BP Location: Left arm, Patient Position: Sitting, Cuff Size: Large Adult)   Pulse 69   Temp 98.4 °F (36.9 °C) (Oral)   Wt 118 kg (260 lb)   SpO2 96%   BMI 41.97 kg/m²     Current Outpatient Medications on File Prior to Visit   Medication Sig Dispense Refill    albuterol sulfate  (90 Base) MCG/ACT inhaler Inhale 2 puffs Every 4 (Four) Hours As Needed for Wheezing. 18 g 6    atorvastatin (LIPITOR) 20 MG tablet Take 2 tablets by mouth Every Night. 90 tablet 3    cetirizine (zyrTEC) 10 MG tablet Take 1 tablet by mouth Daily.      [employer] Fiber Gummies 2 g chewable tablet Chew 2 tablets Daily.      ferrous sulfate 325 (65 FE) MG EC tablet Take 1 tablet by mouth Daily.      fluticasone (FLONASE) 50 MCG/ACT nasal spray 2 sprays into the nostril(s) as directed by provider Daily. 3 g 3    Fluticasone-Umeclidin-Vilant (Trelegy Ellipta) 100-62.5-25 MCG/ACT inhaler Inhale 1 puff Daily. 3 each 3     lisinopril (PRINIVIL,ZESTRIL) 40 MG tablet Take 1 tablet by mouth Daily. 90 tablet 3    metoprolol tartrate (LOPRESSOR) 50 MG tablet Take 1 tablet by mouth 2 (Two) Times a Day. 180 tablet 3    montelukast (SINGULAIR) 10 MG tablet Take 1 tablet by mouth Every Night for 30 days. 30 tablet 0    multivitamin (THERAGRAN) tablet tablet Take 1 tablet by mouth Daily.      omeprazole (priLOSEC) 40 MG capsule Take 1 capsule by mouth Daily. 90 capsule 3    [DISCONTINUED] amoxicillin-clavulanate (AUGMENTIN) 875-125 MG per tablet Take 1 tablet by mouth 2 (Two) Times a Day. 20 tablet 0     No current facility-administered medications on file prior to visit.                 Physical Exam  Constitutional:       Appearance: Normal appearance. She is obese.   HENT:      Head: Normocephalic and atraumatic.      Right Ear: Tympanic membrane and ear canal normal.      Left Ear: Tympanic membrane and ear canal normal.      Nose: Congestion present.      Mouth/Throat:      Pharynx: No oropharyngeal exudate or posterior oropharyngeal erythema.   Cardiovascular:      Rate and Rhythm: Normal rate and regular rhythm.      Heart sounds: No murmur heard.  Pulmonary:      Effort: Pulmonary effort is normal. No respiratory distress.      Breath sounds: Normal breath sounds.   Skin:     General: Skin is warm and dry.   Neurological:      General: No focal deficit present.      Mental Status: She is alert and oriented to person, place, and time.   Psychiatric:         Mood and Affect: Mood normal.         Behavior: Behavior normal.         Thought Content: Thought content normal.         Judgment: Judgment normal.         Assessment & Plan     Diagnoses and all orders for this visit:    1. Sensation of fullness in both ears (Primary)  Comments:  exam normal  likely inflamation in sinuses  cont flonase  add in mucinex            Answers submitted by the patient for this visit:  Other (Submitted on 9/21/2023)  Please describe your symptoms.: I have  pain in both ears  Have you had these symptoms before?: Yes  How long have you been having these symptoms?: Greater than 2 weeks  Please list any medications you are currently taking for this condition.: I have taken them all up.  Please describe any probable cause for these symptoms. : I was told at the [facility] that i had a double ear infection and bronchitis.  Primary Reason for Visit (Submitted on 9/21/2023)  What is the primary reason for your visit?: Other

## 2023-11-13 ENCOUNTER — LAB (OUTPATIENT)
Dept: FAMILY MEDICINE CLINIC | Facility: CLINIC | Age: 63
End: 2023-11-13
Payer: OTHER GOVERNMENT

## 2023-11-13 ENCOUNTER — OFFICE VISIT (OUTPATIENT)
Dept: FAMILY MEDICINE CLINIC | Facility: CLINIC | Age: 63
End: 2023-11-13
Payer: OTHER GOVERNMENT

## 2023-11-13 VITALS
HEIGHT: 66 IN | WEIGHT: 264 LBS | BODY MASS INDEX: 42.43 KG/M2 | HEART RATE: 81 BPM | DIASTOLIC BLOOD PRESSURE: 74 MMHG | SYSTOLIC BLOOD PRESSURE: 139 MMHG | OXYGEN SATURATION: 95 %

## 2023-11-13 DIAGNOSIS — I10 PRIMARY HYPERTENSION: ICD-10-CM

## 2023-11-13 DIAGNOSIS — J45.20 MILD INTERMITTENT ASTHMA WITHOUT COMPLICATION: ICD-10-CM

## 2023-11-13 DIAGNOSIS — E78.2 MIXED HYPERLIPIDEMIA: ICD-10-CM

## 2023-11-13 DIAGNOSIS — I10 PRIMARY HYPERTENSION: Primary | ICD-10-CM

## 2023-11-13 DIAGNOSIS — Z12.11 COLON CANCER SCREENING: ICD-10-CM

## 2023-11-13 DIAGNOSIS — R20.2 NUMBNESS AND TINGLING OF BOTH LEGS: ICD-10-CM

## 2023-11-13 DIAGNOSIS — K21.9 GASTROESOPHAGEAL REFLUX DISEASE, UNSPECIFIED WHETHER ESOPHAGITIS PRESENT: ICD-10-CM

## 2023-11-13 DIAGNOSIS — J30.2 SEASONAL ALLERGIES: ICD-10-CM

## 2023-11-13 DIAGNOSIS — G89.29 CHRONIC RIGHT-SIDED LOW BACK PAIN WITH SCIATICA, SCIATICA LATERALITY UNSPECIFIED: ICD-10-CM

## 2023-11-13 DIAGNOSIS — R73.9 HYPERGLYCEMIA: ICD-10-CM

## 2023-11-13 DIAGNOSIS — M54.40 CHRONIC RIGHT-SIDED LOW BACK PAIN WITH SCIATICA, SCIATICA LATERALITY UNSPECIFIED: ICD-10-CM

## 2023-11-13 DIAGNOSIS — R20.0 NUMBNESS AND TINGLING OF BOTH LEGS: ICD-10-CM

## 2023-11-13 DIAGNOSIS — Z23 IMMUNIZATION DUE: ICD-10-CM

## 2023-11-13 DIAGNOSIS — Z12.31 BREAST CANCER SCREENING BY MAMMOGRAM: ICD-10-CM

## 2023-11-13 LAB
ALBUMIN SERPL-MCNC: 4.3 G/DL (ref 3.5–5.2)
ALBUMIN/GLOB SERPL: 1.5 G/DL
ALP SERPL-CCNC: 96 U/L (ref 39–117)
ALT SERPL W P-5'-P-CCNC: 43 U/L (ref 1–33)
ANION GAP SERPL CALCULATED.3IONS-SCNC: 8 MMOL/L (ref 5–15)
AST SERPL-CCNC: 32 U/L (ref 1–32)
BILIRUB SERPL-MCNC: 0.3 MG/DL (ref 0–1.2)
BUN SERPL-MCNC: 13 MG/DL (ref 8–23)
BUN/CREAT SERPL: 16.9 (ref 7–25)
CALCIUM SPEC-SCNC: 9.9 MG/DL (ref 8.6–10.5)
CHLORIDE SERPL-SCNC: 105 MMOL/L (ref 98–107)
CHOLEST SERPL-MCNC: 128 MG/DL (ref 0–200)
CO2 SERPL-SCNC: 27 MMOL/L (ref 22–29)
CREAT SERPL-MCNC: 0.77 MG/DL (ref 0.57–1)
EGFRCR SERPLBLD CKD-EPI 2021: 87.3 ML/MIN/1.73
GLOBULIN UR ELPH-MCNC: 2.9 GM/DL
GLUCOSE SERPL-MCNC: 138 MG/DL (ref 65–99)
HBA1C MFR BLD: 7 % (ref 4.8–5.6)
HDLC SERPL-MCNC: 36 MG/DL (ref 40–60)
LDLC SERPL CALC-MCNC: 64 MG/DL (ref 0–100)
LDLC/HDLC SERPL: 1.63 {RATIO}
POTASSIUM SERPL-SCNC: 4.3 MMOL/L (ref 3.5–5.2)
PROT SERPL-MCNC: 7.2 G/DL (ref 6–8.5)
SODIUM SERPL-SCNC: 140 MMOL/L (ref 136–145)
TRIGL SERPL-MCNC: 167 MG/DL (ref 0–150)
VLDLC SERPL-MCNC: 28 MG/DL (ref 5–40)

## 2023-11-13 PROCEDURE — 80053 COMPREHEN METABOLIC PANEL: CPT | Performed by: NURSE PRACTITIONER

## 2023-11-13 PROCEDURE — 80061 LIPID PANEL: CPT | Performed by: NURSE PRACTITIONER

## 2023-11-13 PROCEDURE — 36415 COLL VENOUS BLD VENIPUNCTURE: CPT

## 2023-11-13 PROCEDURE — 83036 HEMOGLOBIN GLYCOSYLATED A1C: CPT | Performed by: NURSE PRACTITIONER

## 2023-11-13 RX ORDER — ALBUTEROL SULFATE 90 UG/1
2 AEROSOL, METERED RESPIRATORY (INHALATION) EVERY 4 HOURS PRN
Qty: 18 G | Refills: 6 | Status: SHIPPED | OUTPATIENT
Start: 2023-11-13

## 2023-11-13 RX ORDER — LISINOPRIL 40 MG/1
40 TABLET ORAL DAILY
Qty: 90 TABLET | Refills: 3 | Status: SHIPPED | OUTPATIENT
Start: 2023-11-13

## 2023-11-13 RX ORDER — METOPROLOL TARTRATE 50 MG/1
50 TABLET, FILM COATED ORAL 2 TIMES DAILY
Qty: 180 TABLET | Refills: 3 | Status: SHIPPED | OUTPATIENT
Start: 2023-11-13

## 2023-11-13 RX ORDER — OMEPRAZOLE 40 MG/1
40 CAPSULE, DELAYED RELEASE ORAL DAILY
Qty: 90 CAPSULE | Refills: 3 | Status: SHIPPED | OUTPATIENT
Start: 2023-11-13

## 2023-11-13 RX ORDER — FLUTICASONE FUROATE, UMECLIDINIUM BROMIDE AND VILANTEROL TRIFENATATE 100; 62.5; 25 UG/1; UG/1; UG/1
1 POWDER RESPIRATORY (INHALATION)
Qty: 3 EACH | Refills: 3 | Status: SHIPPED | OUTPATIENT
Start: 2023-11-13

## 2023-11-13 RX ORDER — FLUTICASONE PROPIONATE 50 MCG
2 SPRAY, SUSPENSION (ML) NASAL DAILY
Qty: 3 G | Refills: 3 | Status: SHIPPED | OUTPATIENT
Start: 2023-11-13

## 2023-11-13 RX ORDER — ATORVASTATIN CALCIUM 20 MG/1
40 TABLET, FILM COATED ORAL NIGHTLY
Qty: 90 TABLET | Refills: 3 | Status: SHIPPED | OUTPATIENT
Start: 2023-11-13

## 2023-11-13 RX ORDER — MONTELUKAST SODIUM 10 MG/1
10 TABLET ORAL NIGHTLY
Qty: 90 TABLET | Refills: 3 | Status: SHIPPED | OUTPATIENT
Start: 2023-11-13

## 2023-11-13 NOTE — PROGRESS NOTES
Subjective   Eve Garrett is a 63 y.o. female.       HPI   Pt is here today for a 6 month follow up.   1) HTN - currently on lisinopril 40 mg daily; metoprolol 50 mg bid.  Denies any CP; palpitations; dizziness; headache; trouble with vision.    2) Hyperlipidemia - currently on atorvastatin 40 mg daily. No concerns.   3) Asthma - currently on Trelegy inhaler; albuterol PRN.  She reports frequently feeling SOA with wheezes with any type of exertion/exercise. Followed by pulmonology.  Says she has been using CPAP.    4) GERD - currently on omeprazole 40 mg daily. No concerns.   5) Anemia - currently on ferrous sulfate daily.  Being followed by hematology.    6) Allergies - currently on montelukast 10 mg daily; cetrizine 10 mg daily;  and Flonase daily.  7) Needs order for colon screen and mammogram.    8) Will get PCV 20 vaccine today.   9) Reports stabbing pains in both legs from hips down.  She notices swelling at the end of the day in her ankles.  She has lower right back pain for the past 2 years.  No known injuries.     The following portions of the patient's history were reviewed and updated as appropriate: allergies, current medications, past family history, past medical history, past social history, past surgical history, and problem list.    Review of Systems   Constitutional:  Negative for chills, fatigue and fever.   Eyes:  Negative for visual disturbance.   Respiratory:  Positive for shortness of breath and wheezing. Negative for cough and chest tightness.    Cardiovascular:  Positive for leg swelling. Negative for chest pain and palpitations.   Gastrointestinal:  Negative for abdominal pain, blood in stool, constipation, diarrhea, nausea, vomiting and GERD.   Genitourinary:  Negative for dysuria, frequency, hematuria and urgency.   Musculoskeletal:  Positive for arthralgias and back pain.   Neurological:  Positive for numbness. Negative for dizziness and headache.   Psychiatric/Behavioral:  Negative for  depressed mood. The patient is not nervous/anxious.        Objective   Physical Exam  Vitals reviewed.   Constitutional:       General: She is not in acute distress.     Appearance: Normal appearance. She is obese.   Cardiovascular:      Rate and Rhythm: Normal rate and regular rhythm.      Pulses: Normal pulses.      Heart sounds: Normal heart sounds. No murmur heard.  Pulmonary:      Effort: Pulmonary effort is normal. No respiratory distress.      Breath sounds: Normal breath sounds. No wheezing or rhonchi.   Chest:      Chest wall: No tenderness.   Abdominal:      Tenderness: There is no right CVA tenderness or left CVA tenderness.   Musculoskeletal:      Lumbar back: Tenderness present. No swelling, edema or deformity. Normal range of motion.      Right lower leg: No edema.      Left lower leg: No edema.   Skin:     General: Skin is warm and dry.      Findings: No erythema.   Neurological:      General: No focal deficit present.      Mental Status: She is alert and oriented to person, place, and time.   Psychiatric:         Mood and Affect: Mood normal.                  Procedures   Assessment & Plan   Diagnoses and all orders for this visit:    1. Primary hypertension (Primary)  Comments:  Stable.   Cont. current medication.   Labs ordered.   Work on healthy diet; aim for 150 min exercise weekly and work on weight loss.   RTO in 6 mo.  Orders:  -     Comprehensive metabolic panel; Future  -     Lipid panel; Future    2. Mixed hyperlipidemia  Comments:  Stable.   Cont. current medication.   Labs ordered.   Work on healthy diet; aim for 150 min exercise weekly and work on weight loss.   RTO in 6 mo.  Orders:  -     Comprehensive metabolic panel; Future  -     Lipid panel; Future    3. Seasonal allergies  Comments:  Stable.   Cont. current medication.  Orders:  -     fluticasone (FLONASE) 50 MCG/ACT nasal spray; 2 sprays into the nostril(s) as directed by provider Daily.  Dispense: 3 g; Refill: 3  -      montelukast (SINGULAIR) 10 MG tablet; Take 1 tablet by mouth Every Night.  Dispense: 90 tablet; Refill: 3    4. Hyperglycemia  Comments:  A1C ordered.  Orders:  -     Hemoglobin A1c; Future    5. Mild intermittent asthma without complication  Comments:  Pt advised to f/u with pulmonology.   Discussed using albuterol 30 min prior to exercise.    Cont. current medication.   Labs ordered.    6. Gastroesophageal reflux disease, unspecified whether esophagitis present  Comments:  Stable.   Cont. current medication.   Labs ordered.   Work on healthy diet; aim for 150 min exercise weekly and work on weight loss.   RTO in 6 mo.    7. Breast cancer screening by mammogram  Comments:  Order given for screening mammogram  Orders:  -     Mammo Screening Digital Tomosynthesis Bilateral With CAD; Future    8. Immunization due  Comments:  PCV 20 vaccine today  Orders:  -     Pneumococcal Conjugate Vaccine 20-Valent (PCV20)    9. Colon cancer screening  Comments:  Cologuard ordered.  Orders:  -     Cancel: Cologuard - Stool, Per Rectum; Future  -     Ambulatory Referral For Screening Colonoscopy    10. Numbness and tingling of both legs  Comments:  Xray lumbar spine  Orders:  -     XR Spine Lumbar 2 or 3 View; Future    11. Chronic right-sided low back pain with sciatica, sciatica laterality unspecified  Comments:  Xray lumbar spine  Orders:  -     XR Spine Lumbar 2 or 3 View; Future    Other orders  -     albuterol sulfate  (90 Base) MCG/ACT inhaler; Inhale 2 puffs Every 4 (Four) Hours As Needed for Wheezing.  Dispense: 18 g; Refill: 6  -     atorvastatin (LIPITOR) 20 MG tablet; Take 2 tablets by mouth Every Night.  Dispense: 90 tablet; Refill: 3  -     Fluticasone-Umeclidin-Vilant (Trelegy Ellipta) 100-62.5-25 MCG/ACT inhaler; Inhale 1 puff Daily.  Dispense: 3 each; Refill: 3  -     lisinopril (PRINIVIL,ZESTRIL) 40 MG tablet; Take 1 tablet by mouth Daily.  Dispense: 90 tablet; Refill: 3  -     metoprolol tartrate  (LOPRESSOR) 50 MG tablet; Take 1 tablet by mouth 2 (Two) Times a Day.  Dispense: 180 tablet; Refill: 3  -     omeprazole (priLOSEC) 40 MG capsule; Take 1 capsule by mouth Daily.  Dispense: 90 capsule; Refill: 3

## 2023-11-22 ENCOUNTER — HOSPITAL ENCOUNTER (OUTPATIENT)
Dept: GENERAL RADIOLOGY | Facility: HOSPITAL | Age: 63
Discharge: HOME | End: 2023-11-22
Admitting: NURSE PRACTITIONER
Payer: OTHER GOVERNMENT

## 2023-11-22 DIAGNOSIS — G89.29 CHRONIC RIGHT-SIDED LOW BACK PAIN WITH SCIATICA, SCIATICA LATERALITY UNSPECIFIED: ICD-10-CM

## 2023-11-22 DIAGNOSIS — R20.2 NUMBNESS AND TINGLING OF BOTH LEGS: ICD-10-CM

## 2023-11-22 DIAGNOSIS — M54.40 CHRONIC RIGHT-SIDED LOW BACK PAIN WITH SCIATICA, SCIATICA LATERALITY UNSPECIFIED: ICD-10-CM

## 2023-11-22 DIAGNOSIS — R20.0 NUMBNESS AND TINGLING OF BOTH LEGS: ICD-10-CM

## 2023-11-22 PROCEDURE — 72100 X-RAY EXAM L-S SPINE 2/3 VWS: CPT

## 2023-11-24 DIAGNOSIS — M54.16 LUMBAR RADICULOPATHY, CHRONIC: Primary | ICD-10-CM

## 2023-11-27 NOTE — PROGRESS NOTES
Left message to return call to doctor's office at [facility]. Either to get test results or message from provider.

## 2024-02-05 NOTE — PROGRESS NOTES
Subjective   Eve Garrett is a 63 y.o. female.       HPI   Pt is here today with concern of elevated blood pressures.   She is currently on lisinopril 40 mg daily; metoprolol 50 mg bid for HTN.    BP today is 170/86.  BP at home running 191 systolic when she checked last week.  Reports daily headache for past 3 weeks.  Denies any CP; palpitations; SOA; dizziness; visual trouble.          The following portions of the patient's history were reviewed and updated as appropriate: allergies, current medications, past family history, past medical history, past social history, past surgical history, and problem list.    Review of Systems   Constitutional:  Negative for chills, fatigue and fever.   Respiratory:  Negative for cough and shortness of breath.    Cardiovascular:  Negative for chest pain and palpitations.   Gastrointestinal:  Negative for diarrhea, nausea and vomiting.   Neurological:  Positive for headache. Negative for dizziness, syncope and weakness.   Psychiatric/Behavioral:  Negative for depressed mood. The patient is not nervous/anxious.        Objective   Physical Exam  Vitals reviewed.   Constitutional:       General: She is not in acute distress.     Appearance: Normal appearance. She is obese.   HENT:      Head: Normocephalic and atraumatic.      Right Ear: Hearing, tympanic membrane, ear canal and external ear normal.      Left Ear: Hearing and external ear normal. There is impacted cerumen.      Nose: Nose normal.      Right Sinus: No maxillary sinus tenderness or frontal sinus tenderness.      Left Sinus: No maxillary sinus tenderness or frontal sinus tenderness.      Mouth/Throat:      Lips: Pink.      Mouth: Mucous membranes are moist.      Pharynx: No pharyngeal swelling, oropharyngeal exudate, posterior oropharyngeal erythema or uvula swelling.   Eyes:      Conjunctiva/sclera: Conjunctivae normal.   Cardiovascular:      Rate and Rhythm: Normal rate and regular rhythm.      Pulses: Normal pulses.       Heart sounds: Normal heart sounds. No murmur heard.  Pulmonary:      Effort: Pulmonary effort is normal. No respiratory distress.      Breath sounds: Normal breath sounds. No wheezing or rhonchi.   Chest:      Chest wall: No tenderness.   Abdominal:      Tenderness: There is no right CVA tenderness or left CVA tenderness.   Musculoskeletal:      Cervical back: Normal range of motion and neck supple. No tenderness.   Skin:     General: Skin is warm and dry.      Findings: No erythema.   Neurological:      General: No focal deficit present.      Mental Status: She is alert and oriented to person, place, and time.   Psychiatric:         Mood and Affect: Mood normal.                  Procedures   Assessment & Plan   Diagnoses and all orders for this visit:    1. Essential hypertension (Primary)  Comments:  Adding amlodipine 5 mg daily.   Cont. lisinopril and metoprolol.   Work on healthy diet; aim for 150 min exercise weekly.   BP check here 1-2 weeks  Orders:  -     amLODIPine (NORVASC) 5 MG tablet; Take 1 tablet by mouth Daily.  Dispense: 30 tablet; Refill: 0  -     Ambulatory Referral to Cardiology    2. Mixed hyperlipidemia  Comments:  Refill given on atorvastatin.  Orders:  -     atorvastatin (LIPITOR) 20 MG tablet; Take 2 tablets by mouth Every Night.  Dispense: 180 tablet; Refill: 3

## 2024-02-06 ENCOUNTER — OFFICE VISIT (OUTPATIENT)
Dept: FAMILY MEDICINE CLINIC | Facility: CLINIC | Age: 64
End: 2024-02-06
Payer: OTHER GOVERNMENT

## 2024-02-06 VITALS
HEIGHT: 66 IN | HEART RATE: 65 BPM | OXYGEN SATURATION: 95 % | DIASTOLIC BLOOD PRESSURE: 86 MMHG | SYSTOLIC BLOOD PRESSURE: 170 MMHG | WEIGHT: 266 LBS | BODY MASS INDEX: 42.75 KG/M2

## 2024-02-06 DIAGNOSIS — I10 ESSENTIAL HYPERTENSION: Primary | ICD-10-CM

## 2024-02-06 DIAGNOSIS — E78.2 MIXED HYPERLIPIDEMIA: ICD-10-CM

## 2024-02-06 PROCEDURE — 99214 OFFICE O/P EST MOD 30 MIN: CPT | Performed by: NURSE PRACTITIONER

## 2024-02-06 RX ORDER — ATORVASTATIN CALCIUM 20 MG/1
40 TABLET, FILM COATED ORAL NIGHTLY
Qty: 180 TABLET | Refills: 3 | Status: SHIPPED | OUTPATIENT
Start: 2024-02-06

## 2024-02-06 RX ORDER — AMLODIPINE BESYLATE 5 MG/1
5 TABLET ORAL DAILY
Qty: 30 TABLET | Refills: 0 | Status: SHIPPED | OUTPATIENT
Start: 2024-02-06

## 2024-02-20 ENCOUNTER — HOSPITAL ENCOUNTER (OUTPATIENT)
Facility: HOSPITAL | Age: 64
Setting detail: OBSERVATION
Discharge: HOME | End: 2024-02-21
Attending: EMERGENCY MEDICINE | Admitting: EMERGENCY MEDICINE
Payer: OTHER GOVERNMENT

## 2024-02-20 ENCOUNTER — TELEPHONE (OUTPATIENT)
Dept: FAMILY MEDICINE CLINIC | Facility: CLINIC | Age: 64
End: 2024-02-20
Payer: OTHER GOVERNMENT

## 2024-02-20 ENCOUNTER — APPOINTMENT (OUTPATIENT)
Dept: GENERAL RADIOLOGY | Facility: HOSPITAL | Age: 64
End: 2024-02-20
Payer: OTHER GOVERNMENT

## 2024-02-20 ENCOUNTER — OFFICE VISIT (OUTPATIENT)
Dept: FAMILY MEDICINE CLINIC | Facility: CLINIC | Age: 64
End: 2024-02-20
Payer: OTHER GOVERNMENT

## 2024-02-20 VITALS
WEIGHT: 261 LBS | HEIGHT: 66 IN | SYSTOLIC BLOOD PRESSURE: 138 MMHG | DIASTOLIC BLOOD PRESSURE: 81 MMHG | TEMPERATURE: 98.3 F | HEART RATE: 75 BPM | BODY MASS INDEX: 41.95 KG/M2

## 2024-02-20 DIAGNOSIS — R06.02 SHORTNESS OF BREATH: ICD-10-CM

## 2024-02-20 DIAGNOSIS — R07.9 CHEST PAIN, UNSPECIFIED TYPE: Primary | ICD-10-CM

## 2024-02-20 DIAGNOSIS — I10 ESSENTIAL HYPERTENSION: ICD-10-CM

## 2024-02-20 DIAGNOSIS — E66.01 CLASS 3 SEVERE OBESITY DUE TO EXCESS CALORIES WITH SERIOUS COMORBIDITY AND BODY MASS INDEX (BMI) OF 40.0 TO 44.9 IN ADULT: ICD-10-CM

## 2024-02-20 DIAGNOSIS — R05.1 ACUTE COUGH: ICD-10-CM

## 2024-02-20 DIAGNOSIS — R50.9 SUBJECTIVE FEVER: ICD-10-CM

## 2024-02-20 DIAGNOSIS — R07.89 CHEST TIGHTNESS: ICD-10-CM

## 2024-02-20 DIAGNOSIS — I10 ESSENTIAL HYPERTENSION: Primary | ICD-10-CM

## 2024-02-20 DIAGNOSIS — E66.01 CLASS 3 SEVERE OBESITY DUE TO EXCESS CALORIES WITH SERIOUS COMORBIDITY AND BODY MASS INDEX (BMI) OF 40.0 TO 44.9 IN ADULT: Primary | ICD-10-CM

## 2024-02-20 LAB
ALBUMIN SERPL-MCNC: 4.2 G/DL (ref 3.5–5.2)
ALBUMIN/GLOB SERPL: 1.2 G/DL
ALP SERPL-CCNC: 117 U/L (ref 39–117)
ALT SERPL W P-5'-P-CCNC: 53 U/L (ref 1–33)
ANION GAP SERPL CALCULATED.3IONS-SCNC: 10 MMOL/L (ref 5–15)
AST SERPL-CCNC: 50 U/L (ref 1–32)
BASOPHILS # BLD AUTO: 0.1 10*3/MM3 (ref 0–0.2)
BASOPHILS NFR BLD AUTO: 0.9 % (ref 0–1.5)
BILIRUB SERPL-MCNC: 0.3 MG/DL (ref 0–1.2)
BILIRUB UR QL STRIP: NEGATIVE
BUN SERPL-MCNC: 10 MG/DL (ref 8–23)
BUN/CREAT SERPL: 13 (ref 7–25)
CALCIUM SPEC-SCNC: 10.2 MG/DL (ref 8.6–10.5)
CHLORIDE SERPL-SCNC: 105 MMOL/L (ref 98–107)
CLARITY UR: CLEAR
CO2 SERPL-SCNC: 27 MMOL/L (ref 22–29)
COLOR UR: YELLOW
CREAT SERPL-MCNC: 0.77 MG/DL (ref 0.57–1)
DEPRECATED RDW RBC AUTO: 44.2 FL (ref 37–54)
EGFRCR SERPLBLD CKD-EPI 2021: 87.3 ML/MIN/1.73
EOSINOPHIL # BLD AUTO: 0.1 10*3/MM3 (ref 0–0.4)
EOSINOPHIL NFR BLD AUTO: 2.1 % (ref 0.3–6.2)
ERYTHROCYTE [DISTWIDTH] IN BLOOD BY AUTOMATED COUNT: 14.2 % (ref 12.3–15.4)
EXPIRATION DATE: NORMAL
EXPIRATION DATE: NORMAL
FLUAV AG UPPER RESP QL IA.RAPID: NOT DETECTED
FLUBV AG UPPER RESP QL IA.RAPID: NOT DETECTED
GEN 5 2HR TROPONIN T REFLEX: 11 NG/L
GLOBULIN UR ELPH-MCNC: 3.4 GM/DL
GLUCOSE SERPL-MCNC: 113 MG/DL (ref 65–99)
GLUCOSE UR STRIP-MCNC: NEGATIVE MG/DL
HCT VFR BLD AUTO: 42.6 % (ref 34–46.6)
HGB BLD-MCNC: 14.1 G/DL (ref 12–15.9)
HGB UR QL STRIP.AUTO: NEGATIVE
HOLD SPECIMEN: NORMAL
HOLD SPECIMEN: NORMAL
INTERNAL CONTROL: NORMAL
INTERNAL CONTROL: NORMAL
KETONES UR QL STRIP: NEGATIVE
LEUKOCYTE ESTERASE UR QL STRIP.AUTO: NEGATIVE
LIPASE SERPL-CCNC: 38 U/L (ref 13–60)
LYMPHOCYTES # BLD AUTO: 1.8 10*3/MM3 (ref 0.7–3.1)
LYMPHOCYTES NFR BLD AUTO: 26.4 % (ref 19.6–45.3)
Lab: NORMAL
Lab: NORMAL
MCH RBC QN AUTO: 27.4 PG (ref 26.6–33)
MCHC RBC AUTO-ENTMCNC: 33 G/DL (ref 31.5–35.7)
MCV RBC AUTO: 83 FL (ref 79–97)
MONOCYTES # BLD AUTO: 0.7 10*3/MM3 (ref 0.1–0.9)
MONOCYTES NFR BLD AUTO: 10.6 % (ref 5–12)
NEUTROPHILS NFR BLD AUTO: 4.1 10*3/MM3 (ref 1.7–7)
NEUTROPHILS NFR BLD AUTO: 60 % (ref 42.7–76)
NITRITE UR QL STRIP: NEGATIVE
NRBC BLD AUTO-RTO: 0 /100 WBC (ref 0–0.2)
NT-PROBNP SERPL-MCNC: 42.4 PG/ML (ref 0–900)
PH UR STRIP.AUTO: <=5 [PH] (ref 5–8)
PLATELET # BLD AUTO: 247 10*3/MM3 (ref 140–450)
PMV BLD AUTO: 8.1 FL (ref 6–12)
POTASSIUM SERPL-SCNC: 3.9 MMOL/L (ref 3.5–5.2)
PROT SERPL-MCNC: 7.6 G/DL (ref 6–8.5)
PROT UR QL STRIP: NEGATIVE
RBC # BLD AUTO: 5.13 10*6/MM3 (ref 3.77–5.28)
RSV AG SPEC QL: NOT DETECTED
SARS-COV-2 AG UPPER RESP QL IA.RAPID: NOT DETECTED
SODIUM SERPL-SCNC: 142 MMOL/L (ref 136–145)
SP GR UR STRIP: 1.01 (ref 1–1.03)
TROPONIN T DELTA: 0 NG/L
TROPONIN T SERPL HS-MCNC: 11 NG/L
UROBILINOGEN UR QL STRIP: NORMAL
WBC NRBC COR # BLD AUTO: 6.9 10*3/MM3 (ref 3.4–10.8)
WHOLE BLOOD HOLD COAG: NORMAL
WHOLE BLOOD HOLD SPECIMEN: NORMAL

## 2024-02-20 PROCEDURE — G0378 HOSPITAL OBSERVATION PER HR: HCPCS

## 2024-02-20 PROCEDURE — 93000 ELECTROCARDIOGRAM COMPLETE: CPT | Performed by: FAMILY MEDICINE

## 2024-02-20 PROCEDURE — 93005 ELECTROCARDIOGRAM TRACING: CPT | Performed by: EMERGENCY MEDICINE

## 2024-02-20 PROCEDURE — 87807 RSV ASSAY W/OPTIC: CPT | Performed by: FAMILY MEDICINE

## 2024-02-20 PROCEDURE — 96375 TX/PRO/DX INJ NEW DRUG ADDON: CPT

## 2024-02-20 PROCEDURE — 71045 X-RAY EXAM CHEST 1 VIEW: CPT

## 2024-02-20 PROCEDURE — 93005 ELECTROCARDIOGRAM TRACING: CPT

## 2024-02-20 PROCEDURE — 87428 SARSCOV & INF VIR A&B AG IA: CPT | Performed by: FAMILY MEDICINE

## 2024-02-20 PROCEDURE — 83690 ASSAY OF LIPASE: CPT | Performed by: NURSE PRACTITIONER

## 2024-02-20 PROCEDURE — 83880 ASSAY OF NATRIURETIC PEPTIDE: CPT | Performed by: NURSE PRACTITIONER

## 2024-02-20 PROCEDURE — 85025 COMPLETE CBC W/AUTO DIFF WBC: CPT | Performed by: NURSE PRACTITIONER

## 2024-02-20 PROCEDURE — 25010000002 MORPHINE PER 10 MG: Performed by: NURSE PRACTITIONER

## 2024-02-20 PROCEDURE — 81003 URINALYSIS AUTO W/O SCOPE: CPT | Performed by: NURSE PRACTITIONER

## 2024-02-20 PROCEDURE — 36415 COLL VENOUS BLD VENIPUNCTURE: CPT

## 2024-02-20 PROCEDURE — 99285 EMERGENCY DEPT VISIT HI MDM: CPT

## 2024-02-20 PROCEDURE — 96374 THER/PROPH/DIAG INJ IV PUSH: CPT

## 2024-02-20 PROCEDURE — 80053 COMPREHEN METABOLIC PANEL: CPT | Performed by: NURSE PRACTITIONER

## 2024-02-20 PROCEDURE — 84484 ASSAY OF TROPONIN QUANT: CPT | Performed by: NURSE PRACTITIONER

## 2024-02-20 PROCEDURE — 99214 OFFICE O/P EST MOD 30 MIN: CPT | Performed by: FAMILY MEDICINE

## 2024-02-20 PROCEDURE — 25010000002 ONDANSETRON PER 1 MG: Performed by: NURSE PRACTITIONER

## 2024-02-20 RX ORDER — BISACODYL 10 MG
10 SUPPOSITORY, RECTAL RECTAL DAILY PRN
Status: DISCONTINUED | OUTPATIENT
Start: 2024-02-20 | End: 2024-02-21 | Stop reason: HOSPADM

## 2024-02-20 RX ORDER — ONDANSETRON 2 MG/ML
4 INJECTION INTRAMUSCULAR; INTRAVENOUS ONCE
Status: COMPLETED | OUTPATIENT
Start: 2024-02-20 | End: 2024-02-20

## 2024-02-20 RX ORDER — ONDANSETRON 2 MG/ML
4 INJECTION INTRAMUSCULAR; INTRAVENOUS EVERY 6 HOURS PRN
Status: DISCONTINUED | OUTPATIENT
Start: 2024-02-20 | End: 2024-02-21 | Stop reason: HOSPADM

## 2024-02-20 RX ORDER — POLYETHYLENE GLYCOL 3350 17 G/17G
17 POWDER, FOR SOLUTION ORAL DAILY PRN
Status: DISCONTINUED | OUTPATIENT
Start: 2024-02-20 | End: 2024-02-21 | Stop reason: HOSPADM

## 2024-02-20 RX ORDER — ASPIRIN 81 MG/1
162 TABLET, CHEWABLE ORAL ONCE
Status: DISCONTINUED | OUTPATIENT
Start: 2024-02-20 | End: 2024-02-20 | Stop reason: HOSPADM

## 2024-02-20 RX ORDER — BISACODYL 5 MG/1
5 TABLET, DELAYED RELEASE ORAL DAILY PRN
Status: DISCONTINUED | OUTPATIENT
Start: 2024-02-20 | End: 2024-02-21 | Stop reason: HOSPADM

## 2024-02-20 RX ORDER — AMOXICILLIN 250 MG
2 CAPSULE ORAL 2 TIMES DAILY PRN
Status: DISCONTINUED | OUTPATIENT
Start: 2024-02-20 | End: 2024-02-21 | Stop reason: HOSPADM

## 2024-02-20 RX ORDER — SODIUM CHLORIDE 0.9 % (FLUSH) 0.9 %
10 SYRINGE (ML) INJECTION AS NEEDED
Status: DISCONTINUED | OUTPATIENT
Start: 2024-02-20 | End: 2024-02-21 | Stop reason: HOSPADM

## 2024-02-20 RX ORDER — ACETAMINOPHEN 325 MG/1
650 TABLET ORAL EVERY 4 HOURS PRN
Status: DISCONTINUED | OUTPATIENT
Start: 2024-02-20 | End: 2024-02-21 | Stop reason: HOSPADM

## 2024-02-20 RX ORDER — ASPIRIN 81 MG/1
81 TABLET, CHEWABLE ORAL ONCE
Status: DISCONTINUED | OUTPATIENT
Start: 2024-02-20 | End: 2024-02-20

## 2024-02-20 RX ORDER — SODIUM CHLORIDE 9 MG/ML
40 INJECTION, SOLUTION INTRAVENOUS AS NEEDED
Status: DISCONTINUED | OUTPATIENT
Start: 2024-02-20 | End: 2024-02-21 | Stop reason: HOSPADM

## 2024-02-20 RX ORDER — AMLODIPINE BESYLATE 5 MG/1
5 TABLET ORAL DAILY
Qty: 90 TABLET | Refills: 3 | Status: SHIPPED | OUTPATIENT
Start: 2024-02-20

## 2024-02-20 RX ORDER — CHOLECALCIFEROL (VITAMIN D3) 125 MCG
5 CAPSULE ORAL NIGHTLY PRN
Status: DISCONTINUED | OUTPATIENT
Start: 2024-02-20 | End: 2024-02-21 | Stop reason: HOSPADM

## 2024-02-20 RX ORDER — SODIUM CHLORIDE 0.9 % (FLUSH) 0.9 %
10 SYRINGE (ML) INJECTION EVERY 12 HOURS SCHEDULED
Status: DISCONTINUED | OUTPATIENT
Start: 2024-02-20 | End: 2024-02-21 | Stop reason: HOSPADM

## 2024-02-20 RX ADMIN — ONDANSETRON 4 MG: 2 INJECTION INTRAMUSCULAR; INTRAVENOUS at 15:08

## 2024-02-20 RX ADMIN — ASPIRIN 162 MG: 81 TABLET, CHEWABLE ORAL at 10:51

## 2024-02-20 RX ADMIN — MORPHINE SULFATE 4 MG: 4 INJECTION, SOLUTION INTRAMUSCULAR; INTRAVENOUS at 15:09

## 2024-02-20 RX ADMIN — Medication 10 ML: at 21:25

## 2024-02-20 NOTE — PROGRESS NOTES
Subjective   Eve Garrett is a 63 y.o. female.     History of Present Illness     Eve Garrett is a 63-year-old female who presents today for a nurse visit for follow-up on her blood pressure after amlodipine 5 mg was added to her lisinopril 40 mg and metoprolol 50 mg. She usually sees CRUZ Simeon.    The patient's blood pressure has improved. Her systolic blood pressure has been in the 120s to 150s mmHg. Her heart rates have been as low as in the 60s to 102s bpm within the last week.    She complains of chest tightness that radiates to her bilateral shoulders, back, and the left side of her neck. She denies radiation into her jaw. The tightness is all the way across her chest. The tightness has been steady for the past 7 to 8 hours. She rates her chest tightness at 6 out of 10. She states that she feels the same as when she had pneumonia. She is experiencing shortness of breath constantly. She has had a slight cough the last few days. She has not taken her temperature at home. However, she has felt warm and feels like she is going to vomit most of the time. She drove herself today. Movement does not make the tightness worse. Her daughter and son had the flu strain B 2 weeks ago. She has been taking ibuprofen for the past couple of days. She did not take any today.  The following portions of the patient's history were reviewed and updated as appropriate: allergies, current medications, past family history, past medical history, past social history, past surgical history, and problem list.  Past Medical History:   Diagnosis Date    Allergic rhinitis     Anemia 04/2020    Asthma 11/04/2011    Calcaneal spur of foot, right 02/20/2021    XRAY AT [facility]    Cancer     Appendcele mucous neoplasm    Colon polyp Unknown    Constipation 07/11/2012    Contact dermatitis 07/16/2015    Depression 05/28/2013    Disease of thyroid gland     HYPOTHYROIDISM    Diverticulitis 02/01/2012    Dyspnea 07/13/2020     SEEN AT [facility] ER    Gastroesophageal reflux disease 05/28/2013    Hyperlipidemia     Hypertension     MRSA infection 04/25/2020    Myalgia and myositis 09/13/2011    Obesity 2003    Osteoarthritis 10/30/2014    Ovarian cyst 04/23/2013    Ovarian enlargement, left     Plantar fasciitis, right 02/20/2021    SEEN AT [facility] ER    Renal insufficiency Unknown    Seasonal allergies     Sleep apnea 05/21/2021    NO CPAP     Past Surgical History:   Procedure Laterality Date    APPENDECTOMY N/A 03/20/2020    LAPAROSCOPIC, DR. WILLIAM CHEADLE AT [place]    CARDIAC CATHETERIZATION Left 10/02/2009    No Intervention    CHOLECYSTECTOMY N/A 02/08/2005    DR. CHRIS LOZA AT [facility]    COLON RESECTION LEFT Left 2010    D/T RUPTURED DIVERTICULA, DR. DEA CROWE    COLON RESECTION SMALL BOWEL N/A 04/07/2020    LAPAROSCOPY, MOBILIZATION OF RIGHT COLON, CONVERSION TO OPEN WITH RIGHT TRANSVERSE INCISION, EXTENSIVE LYSIS OF ADHESIONS, AND ILEOCECTOMY, DR. WILLIAM CHEADLE AT [place]    COLONOSCOPY N/A 03/2012    WITH ILEOSCOPY    COLONOSCOPY N/A 11/09/2020    A FEW DIVERTICULA IN DESCENDING AND SIGMOID, MILD INTERNAL HEMORRHOIDS, RLQ ANASTAMOSIS, BX: REACTIVE ILEAL MUCOSA WITH MILD CHRONIC ILEITIS, RESCOPE IN 3 YRS, DR. AUSTEN NÚÑEZ AT [facility]    COLONOSCOPY W/ POLYPECTOMY N/A 02/24/2017    BULBUOUS APPENDIX, POLYPS    DIAGNOSTIC LAPAROSCOPY N/A 1993    FOR ENDOMETRIOSIS    DIAGNOSTIC LAPAROSCOPY N/A 1992    FOR ENDOMETRIOSIS    ENDOSCOPY AND COLONOSCOPY N/A 2009    ERCP WITH SPHINCTEROTOMY/PAPILLOTOMY N/A 2005    WITH STENT    FOOT SURGERY Right 2003    HEMICOLECTOMY Left 06/13/2012    LAPAROSCOPIC LEFT AND SIGMOID HEMICOLECTOMY WITH TRANSVERSE RECTAL ANASTAMOSIS AND LAPAROSCOPIC TAKEDOWN OF SPLENIC FLEXURE, D/T DIVERTICULAR PERFORATION WITH ABSCESS, DR. ABY MCFARLAND AT [facility]    HYSTERECTOMY N/A 1992    ROZINA WITH BSO    SMALL INTESTINE SURGERY  2021    SUBTOTAL HYSTERECTOMY  1992     Family  History   Problem Relation Age of Onset    Cancer Mother     Liver cancer Mother     Basal cell carcinoma Mother     Diabetes Mother     Cancer Father     Lung cancer Father     Hypertension Father         And also my mother had as well as my self    Celiac disease Father     Heart disease Father     Diverticulitis Sister     Hypertension Sister     Cancer Brother         BLADDER CANCER    Cancer Brother     Stomach cancer Brother     Cancer Maternal Aunt     Ovarian cancer Maternal Aunt     Cancer Paternal Aunt     Breast cancer Paternal Aunt     Cancer Paternal Uncle     Stomach cancer Paternal Uncle     Heart disease Maternal Grandmother     Heart disease Paternal Grandmother     Heart failure Paternal Grandmother      Social History     Socioeconomic History    Marital status:    Tobacco Use    Smoking status: Never     Passive exposure: Past    Smokeless tobacco: Never    Tobacco comments:     My father was a heavy smoker, and my brothers all smoke   Vaping Use    Vaping Use: Never used   Substance and Sexual Activity    Alcohol use: Yes     Alcohol/week: 1.0 standard drink of alcohol     Types: 1 Glasses of wine per week     Comment: rarely    Drug use: Never    Sexual activity: Yes     Partners: Male     Birth control/protection: Surgical, Post-menopausal, Hysterectomy, Same-sex partner     Comment: My          Current Outpatient Medications:     amLODIPine (NORVASC) 5 MG tablet, Take 1 tablet by mouth Daily., Disp: 90 tablet, Rfl: 3    albuterol sulfate  (90 Base) MCG/ACT inhaler, Inhale 2 puffs Every 4 (Four) Hours As Needed for Wheezing., Disp: 18 g, Rfl: 6    atorvastatin (LIPITOR) 20 MG tablet, Take 2 tablets by mouth Every Night., Disp: 180 tablet, Rfl: 3    cetirizine (zyrTEC) 10 MG tablet, Take 1 tablet by mouth Daily., Disp: , Rfl:     [employer] Fiber Gummies 2 g chewable tablet, Chew 2 tablets Daily., Disp: , Rfl:     fluticasone (FLONASE) 50 MCG/ACT nasal spray, 2 sprays into the  "nostril(s) as directed by provider Daily., Disp: 3 g, Rfl: 3    Fluticasone-Umeclidin-Vilant (Trelegy Ellipta) 100-62.5-25 MCG/ACT inhaler, Inhale 1 puff Daily., Disp: 3 each, Rfl: 3    lisinopril (PRINIVIL,ZESTRIL) 40 MG tablet, Take 1 tablet by mouth Daily., Disp: 90 tablet, Rfl: 3    metoprolol tartrate (LOPRESSOR) 50 MG tablet, Take 1 tablet by mouth 2 (Two) Times a Day., Disp: 180 tablet, Rfl: 3    montelukast (SINGULAIR) 10 MG tablet, Take 1 tablet by mouth Every Night., Disp: 90 tablet, Rfl: 3    multivitamin (THERAGRAN) tablet tablet, Take 1 tablet by mouth Daily., Disp: , Rfl:     omeprazole (priLOSEC) 40 MG capsule, Take 1 capsule by mouth Daily., Disp: 90 capsule, Rfl: 3    Review of Systems  /81 (BP Location: Left arm, Patient Position: Sitting, Cuff Size: Large Adult)   Pulse 75   Temp 98.3 °F (36.8 °C) (Temporal)   Ht 167.6 cm (66\")   Wt 118 kg (261 lb)   BMI 42.13 kg/m²       A review of systems was performed, and the pertinent positives are noted in the HPI.     Objective   Physical Exam  Vitals and nursing note reviewed.   Constitutional:       Appearance: Normal appearance. She is well-developed and well-groomed. She is morbidly obese.   Cardiovascular:      Rate and Rhythm: Normal rate and regular rhythm.      Heart sounds: Normal heart sounds.   Pulmonary:      Effort: Pulmonary effort is normal.      Breath sounds: Normal breath sounds.   Chest:      Chest wall: No tenderness.   Musculoskeletal:      Right lower leg: No edema.      Left lower leg: No edema.   Skin:     Coloration: Skin is not pale.      Findings: No rash.   Neurological:      Mental Status: She is alert.   Psychiatric:         Behavior: Behavior is cooperative.         ECG 12 Lead    Date/Time: 2/20/2024 10:36 AM  Performed by: Nallely Walker MD    Authorized by: Nallely Walker MD  Comparison: compared with previous ECG from 2/15/2023  Similar to previous ECG  Rhythm: sinus rhythm and atrial " fibrillation  Rate: normal  Conduction: conduction normal  ST Segments: ST segments normal  T Waves: T waves normal  QRS axis: normal  Comments: Sinus rhythm          Assessment & Plan   Problems Addressed this Visit          Cardiac and Vasculature    Essential hypertension - Primary    Relevant Medications    amLODIPine (NORVASC) 5 MG tablet     Other Visit Diagnoses       Chest tightness        Relevant Orders    POCT SARS-CoV-2 Antigen CRUZ + Flu    POCT RSV    ECG 12 Lead    Acute cough        Relevant Orders    POCT SARS-CoV-2 Antigen CRUZ + Flu    POCT RSV    Subjective fever        Relevant Orders    POCT SARS-CoV-2 Antigen CRUZ + Flu    POCT RSV          Diagnoses         Codes Comments    Essential hypertension    -  Primary ICD-10-CM: I10  ICD-9-CM: 401.9     Chest tightness     ICD-10-CM: R07.89  ICD-9-CM: 786.59     Acute cough     ICD-10-CM: R05.1  ICD-9-CM: 786.2     Subjective fever     ICD-10-CM: R50.9  ICD-9-CM: 780.60     Essential hypertension     ICD-10-CM: I10  ICD-9-CM: 401.9           SHE WAS GIVEN 2 ASA    Pt refused transport by EMS but drover herself to ER  I spoke to Radha regarding the patient and my concerns    1. Essential hypertension  - Doing better on the combination of lisinopril, metoprolol, and amlodipine.  - I will leave her on that combination.  - I gave her a 90-day prescription for the amlodipine, and she will follow up with Sybil in 6 months.    2. Acute cough   - Tests for COVID-19, flu, and RSV are all negative.    3. Subjective fever   - Tests for COVID-19, flu, and RSV are all negative.    4. Chest tightness  - I am very concerned about the chest tightness, especially with the nausea that she is having and for the length of time she has been having it.  - EKG showed sinus rhythm and is unchanged from her previous EKG a year ago.  - I explained to her that I wanted her to go to the emergency room by ambulance. The patient refused, but it was agreeable to go to the  emergency room directly.  - At this point, it may be cardiac in origin. It could be that she has pneumonia, and it could be completely unrelated.  - She will schedule a follow-up with Sybil in about 6 months.     Transcribed from ambient dictation for Nallely Walker MD by Chandana Muir.  02/20/24   11:10 EST    Patient or patient representative verbalized consent to the visit recording.  I have personally performed the services described in this document as transcribed by the above individual, and it is both accurate and complete.

## 2024-02-20 NOTE — TELEPHONE ENCOUNTER
I have sent in Zepbound for her.  We'll let her know what her insurance decides on the coverage on the PA is processed.  Once she starts medication, she'll need to plan for a 4 week office follow up.

## 2024-02-20 NOTE — ED PROVIDER NOTES
Subjective   History of Present Illness  Patient is a 63-year-old obese white female with a history of hypertension, hyperlipidemia, thyroid disorder presents today from her primary care provider's office with complaints of chest pain and shortness of breath.  She states she was at her primary care provider's office today for follow-up on her blood pressure.  She states that while there she mentioned that she had been having some chest discomfort, pain and tightness with some associated shortness of breath for the last 3 days.  Does sometimes radiate up into the left side of her neck.  She was sent to the ED for further evaluation.  No fever chills or cough.      Review of Systems   Constitutional:  Negative for fever.   Respiratory:  Positive for shortness of breath. Negative for cough.    Cardiovascular:  Positive for chest pain.       Past Medical History:   Diagnosis Date    Allergic rhinitis     Anemia 04/2020    Asthma 11/04/2011    Calcaneal spur of foot, right 02/20/2021    XRAY AT [facility]    Cancer     Appendcele mucous neoplasm    Colon polyp Unknown    Constipation 07/11/2012    Contact dermatitis 07/16/2015    Depression 05/28/2013    Disease of thyroid gland     HYPOTHYROIDISM    Diverticulitis 02/01/2012    Dyspnea 07/13/2020    SEEN AT [facility] ER    Gastroesophageal reflux disease 05/28/2013    Hyperlipidemia     Hypertension     MRSA infection 04/25/2020    Myalgia and myositis 09/13/2011    Obesity 2003    Osteoarthritis 10/30/2014    Ovarian cyst 04/23/2013    Ovarian enlargement, left     Plantar fasciitis, right 02/20/2021    SEEN AT [facility] ER    Renal insufficiency Unknown    Seasonal allergies     Sleep apnea 05/21/2021    NO CPAP       Allergies   Allergen Reactions    Promethazine Anxiety and Hallucinations     AKA PHENERGAN, Delusions, hallucinations       Past Surgical History:   Procedure Laterality Date    APPENDECTOMY N/A 03/20/2020    LAPAROSCOPIC,   WILLIAM CHEADLE AT [place]    CARDIAC CATHETERIZATION Left 10/02/2009    No Intervention    CHOLECYSTECTOMY N/A 02/08/2005    DR. CHRIS LOZA AT [facility]    COLON RESECTION LEFT Left 2010    D/T RUPTURED DIVERTICULA, DR. DEA CROWE    COLON RESECTION SMALL BOWEL N/A 04/07/2020    LAPAROSCOPY, MOBILIZATION OF RIGHT COLON, CONVERSION TO OPEN WITH RIGHT TRANSVERSE INCISION, EXTENSIVE LYSIS OF ADHESIONS, AND ILEOCECTOMY, DR. WILLIAM CHEADLE AT [place]    COLONOSCOPY N/A 03/2012    WITH ILEOSCOPY    COLONOSCOPY N/A 11/09/2020    A FEW DIVERTICULA IN DESCENDING AND SIGMOID, MILD INTERNAL HEMORRHOIDS, RLQ ANASTAMOSIS, BX: REACTIVE ILEAL MUCOSA WITH MILD CHRONIC ILEITIS, RESCOPE IN 3 YRS, DR. AUSTEN NÚÑEZ AT [facility]    COLONOSCOPY W/ POLYPECTOMY N/A 02/24/2017    BULBUOUS APPENDIX, POLYPS    DIAGNOSTIC LAPAROSCOPY N/A 1993    FOR ENDOMETRIOSIS    DIAGNOSTIC LAPAROSCOPY N/A 1992    FOR ENDOMETRIOSIS    ENDOSCOPY AND COLONOSCOPY N/A 2009    ERCP WITH SPHINCTEROTOMY/PAPILLOTOMY N/A 2005    WITH STENT    FOOT SURGERY Right 2003    HEMICOLECTOMY Left 06/13/2012    LAPAROSCOPIC LEFT AND SIGMOID HEMICOLECTOMY WITH TRANSVERSE RECTAL ANASTAMOSIS AND LAPAROSCOPIC TAKEDOWN OF SPLENIC FLEXURE, D/T DIVERTICULAR PERFORATION WITH ABSCESS, DR. ABY MCFARLAND AT [facility]    HYSTERECTOMY N/A 1992    ROZINA WITH BSO    SMALL INTESTINE SURGERY  2021    SUBTOTAL HYSTERECTOMY  1992       Family History   Problem Relation Age of Onset    Cancer Mother     Liver cancer Mother     Basal cell carcinoma Mother     Diabetes Mother     Cancer Father     Lung cancer Father     Hypertension Father         And also my mother had as well as my self    Celiac disease Father     Heart disease Father     Diverticulitis Sister     Hypertension Sister     Cancer Brother         BLADDER CANCER    Cancer Brother     Stomach cancer Brother     Cancer Maternal Aunt     Ovarian cancer Maternal Aunt     Cancer Paternal Aunt     Breast cancer  Paternal Aunt     Cancer Paternal Uncle     Stomach cancer Paternal Uncle     Heart disease Maternal Grandmother     Heart disease Paternal Grandmother     Heart failure Paternal Grandmother        Social History     Socioeconomic History    Marital status:    Tobacco Use    Smoking status: Never     Passive exposure: Past    Smokeless tobacco: Never    Tobacco comments:     My father was a heavy smoker, and my brothers all smoke   Vaping Use    Vaping Use: Never used   Substance and Sexual Activity    Alcohol use: Yes     Alcohol/week: 1.0 standard drink of alcohol     Types: 1 Glasses of wine per week     Comment: rarely    Drug use: Never    Sexual activity: Yes     Partners: Male     Birth control/protection: Surgical, Post-menopausal, Hysterectomy, Same-sex partner     Comment: My            Objective   Physical Exam  Vital signs and triage nurse note reviewed.  Constitutional: Awake, alert; well-developed and well-nourished. No acute distress is noted.  Obese.  HEENT: Normocephalic, atraumatic; pupils are PERRL with intact EOM; oropharynx is pink and moist without exudate or erythema.  No drooling or pooling of oral secretions.  Neck: Supple, full range of motion without pain; no cervical lymphadenopathy. Normal phonation.  Cardiovascular: Regular rate and rhythm, normal S1-S2.  No murmur noted.  Pulmonary: Respiratory effort regular nonlabored, breath sounds clear to auscultation all fields.  Abdomen: Soft, nontender, nondistended with normoactive bowel sounds; no rebound or guarding.  Musculoskeletal: Independent range of motion of all extremities with no palpable tenderness or edema.  Neuro: Alert oriented x3, speech is clear and appropriate, GCS 15.    Skin: Flesh tone, warm, dry, intact; no erythematous or petechial rash or lesion.    Procedures           ED Course      Labs Reviewed   COMPREHENSIVE METABOLIC PANEL - Abnormal; Notable for the following components:       Result Value     Glucose 113 (*)     ALT (SGPT) 53 (*)     AST (SGOT) 50 (*)     All other components within normal limits    Narrative:     GFR Normal >60  Chronic Kidney Disease <60  Kidney Failure <15     LIPASE - Normal   URINALYSIS W/ MICROSCOPIC IF INDICATED (NO CULTURE) - Normal    Narrative:     Urine microscopic not indicated.   CBC WITH AUTO DIFFERENTIAL - Normal   BNP (IN-HOUSE) - Normal    Narrative:     This assay is used as an aid in the diagnosis of individuals suspected of having heart failure. It can be used as an aid in the diagnosis of acute decompensated heart failure (ADHF) in patients presenting with signs and symptoms of ADHF to the emergency department (ED). In addition, NT-proBNP of <300 pg/mL indicates ADHF is not likely.    Age Range Result Interpretation  NT-proBNP Concentration (pg/mL:      <50             Positive            >450                   Gray                 300-450                    Negative             <300    50-75           Positive            >900                  Gray                300-900                  Negative            <300      >75             Positive            >1800                  Gray                300-1800                  Negative            <300   TROPONIN - Normal    Narrative:     High Sensitive Troponin T Reference Range:  <14.0 ng/L- Negative Female for AMI  <22.0 ng/L- Negative Male for AMI  >=14 - Abnormal Female indicating possible myocardial injury.  >=22 - Abnormal Male indicating possible myocardial injury.   Clinicians would have to utilize clinical acumen, EKG, Troponin, and serial changes to determine if it is an Acute Myocardial Infarction or myocardial injury due to an underlying chronic condition.        HIGH SENSITIVITIY TROPONIN T 2HR - Normal    Narrative:     High Sensitive Troponin T Reference Range:  <14.0 ng/L- Negative Female for AMI  <22.0 ng/L- Negative Male for AMI  >=14 - Abnormal Female indicating possible myocardial injury.  >=22 -  Abnormal Male indicating possible myocardial injury.   Clinicians would have to utilize clinical acumen, EKG, Troponin, and serial changes to determine if it is an Acute Myocardial Infarction or myocardial injury due to an underlying chronic condition.        RAINBOW DRAW    Narrative:     The following orders were created for panel order [place] Draw.  Procedure                               Abnormality         Status                     ---------                               -----------         ------                     Green Top (Gel)[[id number]]                                  Final result               Lavender Top[[id number]]                                     Final result               Gold Top - SST[[id number]]                                   Final result               Light Blue Top[[id number]]                                   Final result                 Please view results for these tests on the individual orders.   GREEN TOP   LAVENDER TOP   GOLD TOP - SST   LIGHT BLUE TOP   CBC AND DIFFERENTIAL    Narrative:     The following orders were created for panel order CBC & Differential.  Procedure                               Abnormality         Status                     ---------                               -----------         ------                     CBC Auto Differential[[id number]]        Normal              Final result                 Please view results for these tests on the individual orders.     XR Chest 1 View    Result Date: 2/20/2024  Impression: 1. Question cardiomegaly and central pulmonary vascular congestion. This may be accentuated by technique as above. 2. Blunting CP angle suggesting small amounts of pleural fluid. Electronically Signed: Julio Pereira MD  2/20/2024 3:05 PM EST  Workstation ID: [id number]   Medications   sodium chloride 0.9 % flush 10 mL (has no administration in time range)   sodium chloride 0.9 % flush 10 mL (has no administration in time range)   sodium chloride 0.9  % infusion 40 mL (has no administration in time range)   acetaminophen (TYLENOL) tablet 650 mg (has no administration in time range)   ondansetron (ZOFRAN) injection 4 mg (has no administration in time range)   melatonin tablet 5 mg (has no administration in time range)   sennosides-docusate (PERICOLACE) 8.6-50 MG per tablet 2 tablet (has no administration in time range)     And   polyethylene glycol (MIRALAX) packet 17 g (has no administration in time range)     And   bisacodyl (DULCOLAX) EC tablet 5 mg (has no administration in time range)     And   bisacodyl (DULCOLAX) suppository 10 mg (has no administration in time range)   ondansetron (ZOFRAN) injection 4 mg (4 mg Intravenous Given 2/20/24 1508)   morphine injection 4 mg (4 mg Intravenous Given 2/20/24 1509)               HEART Score: 3                              Medical Decision Making  Patient presents today with the above complaint.    She had the above exam evaluation.  She is placed on continuous cardiac monitor.  IV was established.  Labs EKG chest x-ray were obtained.  She had aspirin prior to arrival.    Workup: My EKG interpretation shows sinus rhythm with ventricular rate of 77, no acute ST or T wave changes.  CBC is unremarkable.  Metabolic panel appears at baseline.  Normal lipase.  proBNP within normal notes at 42.  Initial high sensitive troponin is 11, repeat is 11.  Chest x-ray shows question cardiomegaly and central pulmonary vascular congestion.  This may be accentuated by technique as above.  Blunting CP angle suggesting small amount of pleural fluid.      Reexamination patient resting quietly no distress.  She has no new complaints.  She is well-appearing and hemodynamically stable.    Findings were discussed with the patient.  She will be placed in observation unit for further cardiac evaluation.    Problems Addressed:  Chest pain, unspecified type: complicated acute illness or injury  Shortness of breath: complicated acute illness or  injury    Amount and/or Complexity of Data Reviewed  Labs: ordered.  Radiology: ordered.  ECG/medicine tests: ordered.    Risk  OTC drugs.  Prescription drug management.  Decision regarding hospitalization.        Final diagnoses:   Chest pain, unspecified type   Shortness of breath       ED Disposition  ED Disposition       ED Disposition   Decision to Admit    Condition   --    Comment   --               No follow-up provider specified.       Medication List      No changes were made to your prescriptions during this visit.            Elayne Mcmahan, APRN  02/20/24 [id number]

## 2024-02-20 NOTE — TELEPHONE ENCOUNTER
Patient states that she needs a hand written RX cause with her insurance she has to take it to [place] to get it filled.

## 2024-02-20 NOTE — TELEPHONE ENCOUNTER
Pt came in for bp check. Moved to an appt in office due to soa and chest discomfort.   Pt says that you asked her to call insurance and see what med would be covered: wegovy, zepbound, or contrave. Pt says that any of the meds will be covered, but they all require a PA.

## 2024-02-20 NOTE — TELEPHONE ENCOUNTER
Zepbound 2.5MG/0.5ML pen-injectors PA request has been submitted to covermymeds. Waiting on determination.     Key: [id number]  PA Case ID #: [id number]  Rx #: [id number]  [facility] PA Form

## 2024-02-21 ENCOUNTER — APPOINTMENT (OUTPATIENT)
Dept: NUCLEAR MEDICINE | Facility: HOSPITAL | Age: 64
End: 2024-02-21
Payer: OTHER GOVERNMENT

## 2024-02-21 ENCOUNTER — READMISSION MANAGEMENT (OUTPATIENT)
Dept: CALL CENTER | Facility: HOSPITAL | Age: 64
End: 2024-02-21
Payer: OTHER GOVERNMENT

## 2024-02-21 VITALS
WEIGHT: 262.35 LBS | HEART RATE: 72 BPM | SYSTOLIC BLOOD PRESSURE: 127 MMHG | OXYGEN SATURATION: 94 % | HEIGHT: 66 IN | BODY MASS INDEX: 42.16 KG/M2 | DIASTOLIC BLOOD PRESSURE: 88 MMHG | TEMPERATURE: 98 F | RESPIRATION RATE: 16 BRPM

## 2024-02-21 DIAGNOSIS — E66.01 CLASS 3 SEVERE OBESITY DUE TO EXCESS CALORIES WITH SERIOUS COMORBIDITY AND BODY MASS INDEX (BMI) OF 40.0 TO 44.9 IN ADULT: ICD-10-CM

## 2024-02-21 DIAGNOSIS — E66.01 CLASS 3 SEVERE OBESITY DUE TO EXCESS CALORIES WITH SERIOUS COMORBIDITY AND BODY MASS INDEX (BMI) OF 40.0 TO 44.9 IN ADULT: Primary | ICD-10-CM

## 2024-02-21 LAB
ANION GAP SERPL CALCULATED.3IONS-SCNC: 11 MMOL/L (ref 5–15)
BASOPHILS # BLD AUTO: 0.1 10*3/MM3 (ref 0–0.2)
BASOPHILS NFR BLD AUTO: 1 % (ref 0–1.5)
BH CV REST NUCLEAR ISOTOPE DOSE: 11 MCI
BH CV STRESS BP STAGE 1: NORMAL
BH CV STRESS BP STAGE 2: NORMAL
BH CV STRESS COMMENTS STAGE 1: NORMAL
BH CV STRESS COMMENTS STAGE 2: NORMAL
BH CV STRESS DOSE REGADENOSON STAGE 1: 0.4
BH CV STRESS DURATION MIN STAGE 1: 0
BH CV STRESS DURATION MIN STAGE 2: 4
BH CV STRESS DURATION SEC STAGE 1: 10
BH CV STRESS DURATION SEC STAGE 2: 0
BH CV STRESS HR STAGE 1: 68
BH CV STRESS HR STAGE 2: 113
BH CV STRESS NUCLEAR ISOTOPE DOSE: 33 MCI
BH CV STRESS PROTOCOL 1: NORMAL
BH CV STRESS RECOVERY BP: NORMAL MMHG
BH CV STRESS RECOVERY HR: 100 BPM
BH CV STRESS STAGE 1: 1
BH CV STRESS STAGE 2: 2
BUN SERPL-MCNC: 13 MG/DL (ref 8–23)
BUN/CREAT SERPL: 20.3 (ref 7–25)
CALCIUM SPEC-SCNC: 9.4 MG/DL (ref 8.6–10.5)
CHLORIDE SERPL-SCNC: 104 MMOL/L (ref 98–107)
CHOLEST SERPL-MCNC: 152 MG/DL (ref 0–200)
CO2 SERPL-SCNC: 27 MMOL/L (ref 22–29)
CREAT SERPL-MCNC: 0.64 MG/DL (ref 0.57–1)
DEPRECATED RDW RBC AUTO: 45.1 FL (ref 37–54)
EGFRCR SERPLBLD CKD-EPI 2021: 100.1 ML/MIN/1.73
EOSINOPHIL # BLD AUTO: 0.2 10*3/MM3 (ref 0–0.4)
EOSINOPHIL NFR BLD AUTO: 3.6 % (ref 0.3–6.2)
ERYTHROCYTE [DISTWIDTH] IN BLOOD BY AUTOMATED COUNT: 14.4 % (ref 12.3–15.4)
GLUCOSE SERPL-MCNC: 126 MG/DL (ref 65–99)
HCT VFR BLD AUTO: 40.3 % (ref 34–46.6)
HDLC SERPL-MCNC: 35 MG/DL (ref 40–60)
HGB BLD-MCNC: 13.1 G/DL (ref 12–15.9)
LDLC SERPL CALC-MCNC: 88 MG/DL (ref 0–100)
LDLC/HDLC SERPL: 2.39 {RATIO}
LV EF NUC BP: 68 %
LYMPHOCYTES # BLD AUTO: 1.9 10*3/MM3 (ref 0.7–3.1)
LYMPHOCYTES NFR BLD AUTO: 31.8 % (ref 19.6–45.3)
MAXIMAL PREDICTED HEART RATE: 158 BPM
MCH RBC QN AUTO: 27.3 PG (ref 26.6–33)
MCHC RBC AUTO-ENTMCNC: 32.5 G/DL (ref 31.5–35.7)
MCV RBC AUTO: 84 FL (ref 79–97)
MONOCYTES # BLD AUTO: 0.8 10*3/MM3 (ref 0.1–0.9)
MONOCYTES NFR BLD AUTO: 12.5 % (ref 5–12)
NEUTROPHILS NFR BLD AUTO: 3.1 10*3/MM3 (ref 1.7–7)
NEUTROPHILS NFR BLD AUTO: 51.1 % (ref 42.7–76)
NRBC BLD AUTO-RTO: 0.1 /100 WBC (ref 0–0.2)
PERCENT MAX PREDICTED HR: 72.78 %
PLATELET # BLD AUTO: 215 10*3/MM3 (ref 140–450)
PMV BLD AUTO: 7.9 FL (ref 6–12)
POTASSIUM SERPL-SCNC: 4.3 MMOL/L (ref 3.5–5.2)
QT INTERVAL: 382 MS
QTC INTERVAL: 431 MS
RBC # BLD AUTO: 4.79 10*6/MM3 (ref 3.77–5.28)
SODIUM SERPL-SCNC: 142 MMOL/L (ref 136–145)
STRESS BASELINE BP: NORMAL MMHG
STRESS BASELINE HR: 68 BPM
STRESS PERCENT HR: 86 %
STRESS POST PEAK BP: NORMAL MMHG
STRESS POST PEAK HR: 115 BPM
STRESS TARGET HR: 134 BPM
TRIGL SERPL-MCNC: 166 MG/DL (ref 0–150)
VLDLC SERPL-MCNC: 29 MG/DL (ref 5–40)
WBC NRBC COR # BLD AUTO: 6.1 10*3/MM3 (ref 3.4–10.8)

## 2024-02-21 PROCEDURE — A9502 TC99M TETROFOSMIN: HCPCS | Performed by: NURSE PRACTITIONER

## 2024-02-21 PROCEDURE — 80061 LIPID PANEL: CPT | Performed by: PHYSICIAN ASSISTANT

## 2024-02-21 PROCEDURE — G0378 HOSPITAL OBSERVATION PER HR: HCPCS

## 2024-02-21 PROCEDURE — 0 TECHNETIUM TETROFOSMIN KIT: Performed by: NURSE PRACTITIONER

## 2024-02-21 PROCEDURE — 80048 BASIC METABOLIC PNL TOTAL CA: CPT | Performed by: NURSE PRACTITIONER

## 2024-02-21 PROCEDURE — 85025 COMPLETE CBC W/AUTO DIFF WBC: CPT | Performed by: NURSE PRACTITIONER

## 2024-02-21 PROCEDURE — 94640 AIRWAY INHALATION TREATMENT: CPT

## 2024-02-21 PROCEDURE — 93018 CV STRESS TEST I&R ONLY: CPT | Performed by: INTERNAL MEDICINE

## 2024-02-21 PROCEDURE — 93017 CV STRESS TEST TRACING ONLY: CPT

## 2024-02-21 PROCEDURE — 25010000002 KETOROLAC TROMETHAMINE PER 15 MG: Performed by: PHYSICIAN ASSISTANT

## 2024-02-21 PROCEDURE — 96375 TX/PRO/DX INJ NEW DRUG ADDON: CPT

## 2024-02-21 PROCEDURE — 78452 HT MUSCLE IMAGE SPECT MULT: CPT

## 2024-02-21 PROCEDURE — 78452 HT MUSCLE IMAGE SPECT MULT: CPT | Performed by: INTERNAL MEDICINE

## 2024-02-21 PROCEDURE — 25010000002 REGADENOSON 0.4 MG/5ML SOLUTION: Performed by: NURSE PRACTITIONER

## 2024-02-21 RX ORDER — ATORVASTATIN CALCIUM 40 MG/1
40 TABLET, FILM COATED ORAL NIGHTLY
Status: DISCONTINUED | OUTPATIENT
Start: 2024-02-21 | End: 2024-02-21 | Stop reason: HOSPADM

## 2024-02-21 RX ORDER — SUCRALFATE 1 G/1
1 TABLET ORAL
Status: DISCONTINUED | OUTPATIENT
Start: 2024-02-21 | End: 2024-02-21 | Stop reason: HOSPADM

## 2024-02-21 RX ORDER — KETOROLAC TROMETHAMINE 30 MG/ML
15 INJECTION, SOLUTION INTRAMUSCULAR; INTRAVENOUS ONCE
Status: COMPLETED | OUTPATIENT
Start: 2024-02-21 | End: 2024-02-21

## 2024-02-21 RX ORDER — AMLODIPINE BESYLATE 5 MG/1
5 TABLET ORAL DAILY
Status: DISCONTINUED | OUTPATIENT
Start: 2024-02-21 | End: 2024-02-21 | Stop reason: HOSPADM

## 2024-02-21 RX ORDER — ALBUTEROL SULFATE 2.5 MG/3ML
2.5 SOLUTION RESPIRATORY (INHALATION) EVERY 6 HOURS PRN
Status: DISCONTINUED | OUTPATIENT
Start: 2024-02-21 | End: 2024-02-21 | Stop reason: HOSPADM

## 2024-02-21 RX ORDER — LISINOPRIL 20 MG/1
40 TABLET ORAL DAILY
Status: DISCONTINUED | OUTPATIENT
Start: 2024-02-21 | End: 2024-02-21 | Stop reason: HOSPADM

## 2024-02-21 RX ORDER — CETIRIZINE HYDROCHLORIDE 10 MG/1
10 TABLET ORAL DAILY
Status: DISCONTINUED | OUTPATIENT
Start: 2024-02-21 | End: 2024-02-21 | Stop reason: HOSPADM

## 2024-02-21 RX ORDER — SUCRALFATE 1 G/1
1 TABLET ORAL
Qty: 90 TABLET | Refills: 0 | Status: SHIPPED | OUTPATIENT
Start: 2024-02-21 | End: 2024-03-22

## 2024-02-21 RX ORDER — METOPROLOL TARTRATE 50 MG/1
50 TABLET, FILM COATED ORAL 2 TIMES DAILY
Status: DISCONTINUED | OUTPATIENT
Start: 2024-02-21 | End: 2024-02-21 | Stop reason: HOSPADM

## 2024-02-21 RX ORDER — REGADENOSON 0.08 MG/ML
0.4 INJECTION, SOLUTION INTRAVENOUS
Status: COMPLETED | OUTPATIENT
Start: 2024-02-21 | End: 2024-02-21

## 2024-02-21 RX ORDER — BUDESONIDE AND FORMOTEROL FUMARATE DIHYDRATE 160; 4.5 UG/1; UG/1
2 AEROSOL RESPIRATORY (INHALATION)
Status: DISCONTINUED | OUTPATIENT
Start: 2024-02-21 | End: 2024-02-21 | Stop reason: HOSPADM

## 2024-02-21 RX ORDER — PANTOPRAZOLE SODIUM 40 MG/1
40 TABLET, DELAYED RELEASE ORAL DAILY
Status: DISCONTINUED | OUTPATIENT
Start: 2024-02-21 | End: 2024-02-21 | Stop reason: HOSPADM

## 2024-02-21 RX ADMIN — KETOROLAC TROMETHAMINE 15 MG: 30 INJECTION, SOLUTION INTRAMUSCULAR; INTRAVENOUS at 13:24

## 2024-02-21 RX ADMIN — ACETAMINOPHEN 650 MG: 325 TABLET, FILM COATED ORAL at 02:57

## 2024-02-21 RX ADMIN — PANTOPRAZOLE SODIUM 40 MG: 40 TABLET, DELAYED RELEASE ORAL at 10:03

## 2024-02-21 RX ADMIN — CETIRIZINE HYDROCHLORIDE 10 MG: 10 TABLET, FILM COATED ORAL at 10:03

## 2024-02-21 RX ADMIN — TETROFOSMIN 1 DOSE: 1.38 INJECTION, POWDER, LYOPHILIZED, FOR SOLUTION INTRAVENOUS at 07:01

## 2024-02-21 RX ADMIN — REGADENOSON 0.4 MG: 0.08 INJECTION, SOLUTION INTRAVENOUS at 09:03

## 2024-02-21 RX ADMIN — METOPROLOL TARTRATE 50 MG: 50 TABLET ORAL at 13:24

## 2024-02-21 RX ADMIN — BUDESONIDE AND FORMOTEROL FUMARATE DIHYDRATE 2 PUFF: 160; 4.5 AEROSOL RESPIRATORY (INHALATION) at 06:37

## 2024-02-21 RX ADMIN — LISINOPRIL 40 MG: 20 TABLET ORAL at 10:03

## 2024-02-21 RX ADMIN — Medication 10 ML: at 10:04

## 2024-02-21 RX ADMIN — TETROFOSMIN 1 DOSE: 1.38 INJECTION, POWDER, LYOPHILIZED, FOR SOLUTION INTRAVENOUS at 09:03

## 2024-02-21 RX ADMIN — AMLODIPINE BESYLATE 5 MG: 5 TABLET ORAL at 10:03

## 2024-02-21 NOTE — PLAN OF CARE
Problem: Adult Inpatient Plan of Care  Goal: Plan of Care Review  Outcome: Ongoing, Progressing  Flowsheets (Taken 2/20/2024 2132)  Plan of Care Reviewed With: patient  Outcome Evaluation: pt admitted to  108. No complaints of chest pain. Call light within reach. Will continue to monitor  Goal: Patient-Specific Goal (Individualized)  Outcome: Ongoing, Progressing  Goal: Absence of Hospital-Acquired Illness or Injury  Outcome: Ongoing, Progressing  Intervention: Identify and Manage Fall Risk  Recent Flowsheet Documentation  Taken 2/20/2024 2025 by Ioana Nieves RN  Safety Promotion/Fall Prevention: safety round/check completed  Intervention: Prevent Skin Injury  Recent Flowsheet Documentation  Taken 2/20/2024 2025 by Ioana Nivees RN  Body Position: position changed independently  Intervention: Prevent and Manage VTE (Venous Thromboembolism) Risk  Recent Flowsheet Documentation  Taken 2/20/2024 2025 by Ioana Nieves RN  Activity Management: ambulated outside room  Intervention: Prevent Infection  Recent Flowsheet Documentation  Taken 2/20/2024 2025 by Ioana Nieves RN  Infection Prevention: hand hygiene promoted  Goal: Optimal Comfort and Wellbeing  Outcome: Ongoing, Progressing  Intervention: Provide Person-Centered Care  Recent Flowsheet Documentation  Taken 2/20/2024 2025 by Ioaan Nieves RN  Trust Relationship/Rapport:   care explained   choices provided   questions answered   questions encouraged   reassurance provided   thoughts/feelings acknowledged  Goal: Readiness for Transition of Care  Outcome: Ongoing, Progressing  Intervention: Mutually Develop Transition Plan  Recent Flowsheet Documentation  Taken 2/20/2024 2021 by Ioaan Nieves RN  Transportation Anticipated: family or friend will provide  Patient/Family Anticipated Services at Transition: none  Patient/Family Anticipates Transition to: home with family  Taken 2/20/2024 2016 by Ioana Nieves RN  Equipment Currently Used at Home: none     Problem:  Chest Pain  Goal: Resolution of Chest Pain Symptoms  Outcome: Ongoing, Progressing     Problem: Fall Injury Risk  Goal: Absence of Fall and Fall-Related Injury  Outcome: Ongoing, Progressing  Intervention: Identify and Manage Contributors  Recent Flowsheet Documentation  Taken 2/20/2024 2025 by Ioana Nieves RN  Medication Review/Management: medications reviewed  Intervention: Promote Injury-Free Environment  Recent Flowsheet Documentation  Taken 2/20/2024 2025 by Ioana Nieves RN  Safety Promotion/Fall Prevention: safety round/check completed   Goal Outcome Evaluation:  Plan of Care Reviewed With: patient           Outcome Evaluation: pt admitted to  108. No complaints of chest pain. Call light within reach. Will continue to monitor

## 2024-02-21 NOTE — TELEPHONE ENCOUNTER
Ok.  Please let patient know her insurance denied Zepbound injectable for weight loss.  I will send in Contrave (pill) for her.  Thanks.

## 2024-02-21 NOTE — PAYOR COMM NOTE
"      This submission is an OBSERVATION prior authorization request, please see attached PA form and clinical.    ER ADMIT--PLACED IN OBSERVATION STATUS ON 2/20/24    OBS AUTHORIZATION PENDING:     Please call or fax determination to contact below.   Thank you.    RONNY HillmanN, RN, [facility]  Utilization Review Nurse  [facility]  Direct & confidential phone # [phone]  Fax # [phone]        Katy Garrett (63 y.o. Female)       Date of Birth   1960    Social Security Number       Address   [address] [place] [address]    Home Phone   [phone]    MRN   [id number]       Confucianist   Anabaptist    Marital Status                               Admission Date   2/20/24    Admission Type   Emergency    Admitting Provider   Marek Carroll DO    Attending Provider       Department, Room/Bed   [facility] OBSERVATION, 108/1       Discharge Date       Discharge Disposition       Discharge Destination                                 Attending Provider: (none)   Allergies: Promethazine    Isolation: None   Infection: MRSA/History Only (02/15/23)   Code Status: CPR    Ht: 167.6 cm (66\")   Wt: 119 kg (262 lb 5.6 oz)    Admission Cmt: None   Principal Problem: Chest pain [R07.9]                   Active Insurance as of 2/20/2024       Primary Coverage       Payor Plan Insurance Group Employer/Plan Group    [facility]        Payor Plan Address Payor Plan Phone Number Payor Plan Fax Number Effective Dates    [address] [phone]  1/1/2023 - None Entered    [facility] [id number]         Subscriber Name Subscriber Birth Date Member ID       [id number] D 1960 [id number]                     Emergency Contacts        (Rel.) Home Phone Work Phone Mobile Phone    DARRON GARRETT (Spouse) [phone] -- [phone]    Shital Lea (Daughter) -- -- [phone]              History & Physical    No notes of this type exist for this " encounter.          Emergency Department Notes        Elayne Mcmahan APRN at 02/20/24 1804       Attestation signed by Marek Carroll DO at 02/20/24 1496        SUPERVISE: For this patient encounter, I reviewed the APC's documentation, treatment plan, and medical decision making.  Marek Carroll DO 2/20/2024 21:48 EST                         Subjective   History of Present Illness  Patient is a 63-year-old obese white female with a history of hypertension, hyperlipidemia, thyroid disorder presents today from her primary care provider's office with complaints of chest pain and shortness of breath.  She states she was at her primary care provider's office today for follow-up on her blood pressure.  She states that while there she mentioned that she had been having some chest discomfort, pain and tightness with some associated shortness of breath for the last 3 days.  Does sometimes radiate up into the left side of her neck.  She was sent to the ED for further evaluation.  No fever chills or cough.      Review of Systems   Constitutional:  Negative for fever.   Respiratory:  Positive for shortness of breath. Negative for cough.    Cardiovascular:  Positive for chest pain.       Past Medical History:   Diagnosis Date    Allergic rhinitis     Anemia 04/2020    Asthma 11/04/2011    Calcaneal spur of foot, right 02/20/2021    XRAY AT [facility]    Cancer     Appendcele mucous neoplasm    Colon polyp Unknown    Constipation 07/11/2012    Contact dermatitis 07/16/2015    Depression 05/28/2013    Disease of thyroid gland     HYPOTHYROIDISM    Diverticulitis 02/01/2012    Dyspnea 07/13/2020    SEEN AT [facility] ER    Gastroesophageal reflux disease 05/28/2013    Hyperlipidemia     Hypertension     MRSA infection 04/25/2020    Myalgia and myositis 09/13/2011    Obesity 2003    Osteoarthritis 10/30/2014    Ovarian cyst 04/23/2013    Ovarian enlargement, left     Plantar fasciitis, right 02/20/2021     SEEN AT [facility] ER    Renal insufficiency Unknown    Seasonal allergies     Sleep apnea 05/21/2021    NO CPAP       Allergies   Allergen Reactions    Promethazine Anxiety and Hallucinations     AKA PHENERGAN, Delusions, hallucinations       Past Surgical History:   Procedure Laterality Date    APPENDECTOMY N/A 03/20/2020    LAPAROSCOPIC, DR. WILLIAM CHEADLE AT [place]    CARDIAC CATHETERIZATION Left 10/02/2009    No Intervention    CHOLECYSTECTOMY N/A 02/08/2005    DR. CHRIS LOZA AT [facility]    COLON RESECTION LEFT Left 2010    D/T RUPTURED DIVERTICULA, DR. DEA CROWE    COLON RESECTION SMALL BOWEL N/A 04/07/2020    LAPAROSCOPY, MOBILIZATION OF RIGHT COLON, CONVERSION TO OPEN WITH RIGHT TRANSVERSE INCISION, EXTENSIVE LYSIS OF ADHESIONS, AND ILEOCECTOMY, DR. WILLIAM CHEADLE AT [place]    COLONOSCOPY N/A 03/2012    WITH ILEOSCOPY    COLONOSCOPY N/A 11/09/2020    A FEW DIVERTICULA IN DESCENDING AND SIGMOID, MILD INTERNAL HEMORRHOIDS, RLQ ANASTAMOSIS, BX: REACTIVE ILEAL MUCOSA WITH MILD CHRONIC ILEITIS, RESCOPE IN 3 YRS, DR. AUSTEN NÚÑEZ AT [facility]    COLONOSCOPY W/ POLYPECTOMY N/A 02/24/2017    BULBUOUS APPENDIX, POLYPS    DIAGNOSTIC LAPAROSCOPY N/A 1993    FOR ENDOMETRIOSIS    DIAGNOSTIC LAPAROSCOPY N/A 1992    FOR ENDOMETRIOSIS    ENDOSCOPY AND COLONOSCOPY N/A 2009    ERCP WITH SPHINCTEROTOMY/PAPILLOTOMY N/A 2005    WITH STENT    FOOT SURGERY Right 2003    HEMICOLECTOMY Left 06/13/2012    LAPAROSCOPIC LEFT AND SIGMOID HEMICOLECTOMY WITH TRANSVERSE RECTAL ANASTAMOSIS AND LAPAROSCOPIC TAKEDOWN OF SPLENIC FLEXURE, D/T DIVERTICULAR PERFORATION WITH ABSCESS, DR. ABY MCFARLAND AT [facility]    HYSTERECTOMY N/A 1992    ROZINA WITH BSO    SMALL INTESTINE SURGERY  2021    SUBTOTAL HYSTERECTOMY  1992       Family History   Problem Relation Age of Onset    Cancer Mother     Liver cancer Mother     Basal cell carcinoma Mother     Diabetes Mother     Cancer Father     Lung cancer Father      Hypertension Father         And also my mother had as well as my self    Celiac disease Father     Heart disease Father     Diverticulitis Sister     Hypertension Sister     Cancer Brother         BLADDER CANCER    Cancer Brother     Stomach cancer Brother     Cancer Maternal Aunt     Ovarian cancer Maternal Aunt     Cancer Paternal Aunt     Breast cancer Paternal Aunt     Cancer Paternal Uncle     Stomach cancer Paternal Uncle     Heart disease Maternal Grandmother     Heart disease Paternal Grandmother     Heart failure Paternal Grandmother        Social History     Socioeconomic History    Marital status:    Tobacco Use    Smoking status: Never     Passive exposure: Past    Smokeless tobacco: Never    Tobacco comments:     My father was a heavy smoker, and my brothers all smoke   Vaping Use    Vaping Use: Never used   Substance and Sexual Activity    Alcohol use: Yes     Alcohol/week: 1.0 standard drink of alcohol     Types: 1 Glasses of wine per week     Comment: rarely    Drug use: Never    Sexual activity: Yes     Partners: Male     Birth control/protection: Surgical, Post-menopausal, Hysterectomy, Same-sex partner     Comment: My            Objective   Physical Exam  Vital signs and triage nurse note reviewed.  Constitutional: Awake, alert; well-developed and well-nourished. No acute distress is noted.  Obese.  HEENT: Normocephalic, atraumatic; pupils are PERRL with intact EOM; oropharynx is pink and moist without exudate or erythema.  No drooling or pooling of oral secretions.  Neck: Supple, full range of motion without pain; no cervical lymphadenopathy. Normal phonation.  Cardiovascular: Regular rate and rhythm, normal S1-S2.  No murmur noted.  Pulmonary: Respiratory effort regular nonlabored, breath sounds clear to auscultation all fields.  Abdomen: Soft, nontender, nondistended with normoactive bowel sounds; no rebound or guarding.  Musculoskeletal: Independent range of motion of all  extremities with no palpable tenderness or edema.  Neuro: Alert oriented x3, speech is clear and appropriate, GCS 15.    Skin: Flesh tone, warm, dry, intact; no erythematous or petechial rash or lesion.    Procedures          ED Course      Labs Reviewed   COMPREHENSIVE METABOLIC PANEL - Abnormal; Notable for the following components:       Result Value    Glucose 113 (*)     ALT (SGPT) 53 (*)     AST (SGOT) 50 (*)     All other components within normal limits    Narrative:     GFR Normal >60  Chronic Kidney Disease <60  Kidney Failure <15     LIPASE - Normal   URINALYSIS W/ MICROSCOPIC IF INDICATED (NO CULTURE) - Normal    Narrative:     Urine microscopic not indicated.   CBC WITH AUTO DIFFERENTIAL - Normal   BNP (IN-HOUSE) - Normal    Narrative:     This assay is used as an aid in the diagnosis of individuals suspected of having heart failure. It can be used as an aid in the diagnosis of acute decompensated heart failure (ADHF) in patients presenting with signs and symptoms of ADHF to the emergency department (ED). In addition, NT-proBNP of <300 pg/mL indicates ADHF is not likely.    Age Range Result Interpretation  NT-proBNP Concentration (pg/mL:      <50             Positive            >450                   Gray                 300-450                    Negative             <300    50-75           Positive            >900                  Gray                300-900                  Negative            <300      >75             Positive            >1800                  Gray                300-1800                  Negative            <300   TROPONIN - Normal    Narrative:     High Sensitive Troponin T Reference Range:  <14.0 ng/L- Negative Female for AMI  <22.0 ng/L- Negative Male for AMI  >=14 - Abnormal Female indicating possible myocardial injury.  >=22 - Abnormal Male indicating possible myocardial injury.   Clinicians would have to utilize clinical acumen, EKG, Troponin, and serial changes to determine  if it is an Acute Myocardial Infarction or myocardial injury due to an underlying chronic condition.        HIGH SENSITIVITIY TROPONIN T 2HR - Normal    Narrative:     High Sensitive Troponin T Reference Range:  <14.0 ng/L- Negative Female for AMI  <22.0 ng/L- Negative Male for AMI  >=14 - Abnormal Female indicating possible myocardial injury.  >=22 - Abnormal Male indicating possible myocardial injury.   Clinicians would have to utilize clinical acumen, EKG, Troponin, and serial changes to determine if it is an Acute Myocardial Infarction or myocardial injury due to an underlying chronic condition.        RAINBOW DRAW    Narrative:     The following orders were created for panel order [place] Draw.  Procedure                               Abnormality         Status                     ---------                               -----------         ------                     Green Top (Gel)[[id number]]                                  Final result               Lavender Top[[id number]]                                     Final result               Gold Top - SST[[id number]]                                   Final result               Light Blue Top[[id number]]                                   Final result                 Please view results for these tests on the individual orders.   GREEN TOP   LAVENDER TOP   GOLD TOP - SST   LIGHT BLUE TOP   CBC AND DIFFERENTIAL    Narrative:     The following orders were created for panel order CBC & Differential.  Procedure                               Abnormality         Status                     ---------                               -----------         ------                     CBC Auto Differential[[id number]]        Normal              Final result                 Please view results for these tests on the individual orders.     XR Chest 1 View    Result Date: 2/20/2024  Impression: 1. Question cardiomegaly and central pulmonary vascular congestion. This may be accentuated by  technique as above. 2. Blunting CP angle suggesting small amounts of pleural fluid. Electronically Signed: Julio Pereira MD  2/20/2024 3:05 PM EST  Workstation ID: [id number]   Medications   sodium chloride 0.9 % flush 10 mL (has no administration in time range)   sodium chloride 0.9 % flush 10 mL (has no administration in time range)   sodium chloride 0.9 % infusion 40 mL (has no administration in time range)   acetaminophen (TYLENOL) tablet 650 mg (has no administration in time range)   ondansetron (ZOFRAN) injection 4 mg (has no administration in time range)   melatonin tablet 5 mg (has no administration in time range)   sennosides-docusate (PERICOLACE) 8.6-50 MG per tablet 2 tablet (has no administration in time range)     And   polyethylene glycol (MIRALAX) packet 17 g (has no administration in time range)     And   bisacodyl (DULCOLAX) EC tablet 5 mg (has no administration in time range)     And   bisacodyl (DULCOLAX) suppository 10 mg (has no administration in time range)   ondansetron (ZOFRAN) injection 4 mg (4 mg Intravenous Given 2/20/24 1508)   morphine injection 4 mg (4 mg Intravenous Given 2/20/24 1509)               HEART Score: 3                              Medical Decision Making  Patient presents today with the above complaint.    She had the above exam evaluation.  She is placed on continuous cardiac monitor.  IV was established.  Labs EKG chest x-ray were obtained.  She had aspirin prior to arrival.    Workup: My EKG interpretation shows sinus rhythm with ventricular rate of 77, no acute ST or T wave changes.  CBC is unremarkable.  Metabolic panel appears at baseline.  Normal lipase.  proBNP within normal notes at 42.  Initial high sensitive troponin is 11, repeat is 11.  Chest x-ray shows question cardiomegaly and central pulmonary vascular congestion.  This may be accentuated by technique as above.  Blunting CP angle suggesting small amount of pleural fluid.      Reexamination patient resting  quietly no distress.  She has no new complaints.  She is well-appearing and hemodynamically stable.    Findings were discussed with the patient.  She will be placed in observation unit for further cardiac evaluation.    Problems Addressed:  Chest pain, unspecified type: complicated acute illness or injury  Shortness of breath: complicated acute illness or injury    Amount and/or Complexity of Data Reviewed  Labs: ordered.  Radiology: ordered.  ECG/medicine tests: ordered.    Risk  OTC drugs.  Prescription drug management.  Decision regarding hospitalization.        Final diagnoses:   Chest pain, unspecified type   Shortness of breath       ED Disposition  ED Disposition       ED Disposition   Decision to Admit    Condition   --    Comment   --               No follow-up provider specified.       Medication List      No changes were made to your prescriptions during this visit.            Elayne Mcmahan APRN  02/20/24 1832      Electronically signed by Marek Carroll DO at 02/20/24 8590       Operative/Procedure Notes (last 48 hours)  Notes from 02/19/24 0854 through 02/21/24 0854   No notes of this type exist for this encounter.       Physician Progress Notes (last 48 hours)  Notes from 02/19/24 0854 through 02/21/24 0854   No notes of this type exist for this encounter.       Consult Notes (last 48 hours)  Notes from 02/19/24 0854 through 02/21/24 0854   No notes of this type exist for this encounter.

## 2024-02-21 NOTE — DISCHARGE SUMMARY
[facility]    Sybil Dangelo, APRN    CHIEF COMPLAINT:     Chest pain and dyspnea    HISTORY OF PRESENT ILLNESS:    Obtained from ED provider HPI on 2/20/2021:  Patient is a 63-year-old obese white female with a history of hypertension, hyperlipidemia, thyroid disorder presents today from her primary care provider's office with complaints of chest pain and shortness of breath.  She states she was at her primary care provider's office today for follow-up on her blood pressure.  She states that while there she mentioned that she had been having some chest discomfort, pain and tightness with some associated shortness of breath for the last 3 days.  Does sometimes radiate up into the left side of her neck.  She was sent to the ED for further evaluation.  No fever chills or cough.    02/21/24:  Patient confirms the HPI noted above including approximately 4-day history of intermittent episodes of chest pain with some associated dyspnea.  No obvious provoking or palliative factors are reported and she notes that pain is typically present for 10 to 15 minutes before resolving spontaneously.  She notes that the pain is located across the upper portion of her chest bilaterally and will occasionally radiate towards her left arm.  She does note some occasional worsening of her dyspnea typically with exertion such as walking at least 1 flight of stairs and feels that she may have had some subjective fever over the past several days though this has subsequently resolved.  She also has a recently developed nonproductive cough.  She is currently on scheduled and as needed breathing treatments though she denies any official diagnosis of COPD.  Patient does not smoke and has never smoked though she reports as a child she was exposed to a significant amount of secondhand smoke from her parents and brothers.  She was recently started on a new blood pressure medication approximately 2 weeks prior.  Family  history is notable for paternal grandmother who had an MI and passed away at the age of 96.            Past Medical History:   Diagnosis Date    Allergic rhinitis     Anemia 04/2020    Asthma 11/04/2011    Calcaneal spur of foot, right 02/20/2021    XRAY AT [facility]    Cancer     Appendcele mucous neoplasm    Colon polyp Unknown    Constipation 07/11/2012    Contact dermatitis 07/16/2015    Depression 05/28/2013    Disease of thyroid gland     HYPOTHYROIDISM    Diverticulitis 02/01/2012    Dyspnea 07/13/2020    SEEN AT [facility]    Gastroesophageal reflux disease 05/28/2013    Hyperlipidemia     Hypertension     MRSA infection 04/25/2020    Myalgia and myositis 09/13/2011    Obesity 2003    Osteoarthritis 10/30/2014    Ovarian cyst 04/23/2013    Ovarian enlargement, left     Plantar fasciitis, right 02/20/2021    SEEN AT [facility]    Renal insufficiency Unknown    Seasonal allergies     Sleep apnea 05/21/2021    NO CPAP     Past Surgical History:   Procedure Laterality Date    APPENDECTOMY N/A 03/20/2020    LAPAROSCOPIC, DR. WILLIAM CHEADLE AT [place]    CARDIAC CATHETERIZATION Left 10/02/2009    No Intervention    CHOLECYSTECTOMY N/A 02/08/2005    DR. CHRIS LOZA AT [facility]    COLON RESECTION LEFT Left 2010    D/T RUPTURED DIVERTICULA, DR. DEA CROWE    COLON RESECTION SMALL BOWEL N/A 04/07/2020    LAPAROSCOPY, MOBILIZATION OF RIGHT COLON, CONVERSION TO OPEN WITH RIGHT TRANSVERSE INCISION, EXTENSIVE LYSIS OF ADHESIONS, AND ILEOCECTOMY, DR. WILLIAM CHEADLE AT [place]    COLONOSCOPY N/A 03/2012    WITH ILEOSCOPY    COLONOSCOPY N/A 11/09/2020    A FEW DIVERTICULA IN DESCENDING AND SIGMOID, MILD INTERNAL HEMORRHOIDS, RLQ ANASTAMOSIS, BX: REACTIVE ILEAL MUCOSA WITH MILD CHRONIC ILEITIS, RESCOPE IN 3 YRS, DR. AUSTEN NÚÑEZ AT [facility]    COLONOSCOPY W/ POLYPECTOMY N/A 02/24/2017    BULBUOUS APPENDIX, POLYPS    DIAGNOSTIC LAPAROSCOPY N/A 1993    FOR ENDOMETRIOSIS     DIAGNOSTIC LAPAROSCOPY N/A 1992    FOR ENDOMETRIOSIS    ENDOSCOPY AND COLONOSCOPY N/A 2009    ERCP WITH SPHINCTEROTOMY/PAPILLOTOMY N/A 2005    WITH STENT    FOOT SURGERY Right 2003    HEMICOLECTOMY Left 06/13/2012    LAPAROSCOPIC LEFT AND SIGMOID HEMICOLECTOMY WITH TRANSVERSE RECTAL ANASTAMOSIS AND LAPAROSCOPIC TAKEDOWN OF SPLENIC FLEXURE, D/T DIVERTICULAR PERFORATION WITH ABSCESS, DR. ABY MCFARLAND AT [facility]    HYSTERECTOMY N/A 1992    ROZINA WITH BSO    SMALL INTESTINE SURGERY  2021    SUBTOTAL HYSTERECTOMY  1992     Family History   Problem Relation Age of Onset    Cancer Mother     Liver cancer Mother     Basal cell carcinoma Mother     Diabetes Mother     Cancer Father     Lung cancer Father     Hypertension Father         And also my mother had as well as my self    Celiac disease Father     Heart disease Father     Diverticulitis Sister     Hypertension Sister     Cancer Brother         BLADDER CANCER    Cancer Brother     Stomach cancer Brother     Cancer Maternal Aunt     Ovarian cancer Maternal Aunt     Cancer Paternal Aunt     Breast cancer Paternal Aunt     Cancer Paternal Uncle     Stomach cancer Paternal Uncle     Heart disease Maternal Grandmother     Heart disease Paternal Grandmother     Heart failure Paternal Grandmother      Social History     Tobacco Use    Smoking status: Never     Passive exposure: Past    Smokeless tobacco: Never    Tobacco comments:     My father was a heavy smoker, and my brothers all smoke   Vaping Use    Vaping Use: Never used   Substance Use Topics    Alcohol use: Yes     Alcohol/week: 1.0 standard drink of alcohol     Types: 1 Glasses of wine per week     Comment: rarely    Drug use: Never     Facility-Administered Medications Prior to Admission   Medication Dose Route Frequency Provider Last Rate Last Admin    [COMPLETED] aspirin chewable tablet 162 mg  162 mg Oral Once Nallely Walker MD   162 mg at 02/20/24 1051    [DISCONTINUED] aspirin chewable tablet  81 mg  81 mg Oral Once Nallely Walker MD         Medications Prior to Admission   Medication Sig Dispense Refill Last Dose    albuterol sulfate  (90 Base) MCG/ACT inhaler Inhale 2 puffs Every 4 (Four) Hours As Needed for Wheezing. 18 g 6 Past Week    amLODIPine (NORVASC) 5 MG tablet Take 1 tablet by mouth Daily. 90 tablet 3 2/19/2024    atorvastatin (LIPITOR) 20 MG tablet Take 2 tablets by mouth Every Night. 180 tablet 3 2/20/2024    cetirizine (zyrTEC) 10 MG tablet Take 1 tablet by mouth Daily.   2/20/2024    fluticasone (FLONASE) 50 MCG/ACT nasal spray 2 sprays into the nostril(s) as directed by provider Daily. 3 g 3 2/20/2024    Fluticasone-Umeclidin-Vilant (Trelegy Ellipta) 100-62.5-25 MCG/ACT inhaler Inhale 1 puff Daily. 3 each 3 2/20/2024    lisinopril (PRINIVIL,ZESTRIL) 40 MG tablet Take 1 tablet by mouth Daily. 90 tablet 3 2/20/2024    metoprolol tartrate (LOPRESSOR) 50 MG tablet Take 1 tablet by mouth 2 (Two) Times a Day. 180 tablet 3 2/20/2024    omeprazole (priLOSEC) 40 MG capsule Take 1 capsule by mouth Daily. 90 capsule 3 2/20/2024    multivitamin (THERAGRAN) tablet tablet Take 1 tablet by mouth Daily.   Unknown    Tirzepatide-Weight Management (ZEPBOUND) 2.5 MG/0.5ML solution auto-injector Inject 0.5 mL under the skin into the appropriate area as directed 1 (One) Time Per Week. 3 mL 0 Unknown     Allergies:  Promethazine    Immunization History   Administered Date(s) Administered    COVID-19 (PFIZER) Purple Cap Monovalent 06/08/2021, 06/29/2021, 01/04/2022    COVID-19 (UNSPECIFIED) 06/29/2021    Flu Vaccine Intradermal Quad 18-64YR 10/12/2011, 10/01/2018    Flu Vaccine Quad PF 6-35MO 10/16/2017, 09/21/2021    Flublok 18+yrs 11/04/2019, 10/10/2020, 10/18/2022    Fluzone (or Fluarix & Flulaval for VFC) >6mos 09/21/2021    Influenza Quad Vaccine (Inpatient) 09/21/2015, 10/29/2016    Influenza TIV (IM) 10/01/2018    Influenza, Unspecified 11/04/2019, 10/10/2020, 10/10/2020, 10/23/2023     Pneumococcal Conjugate 20-Valent (PCV20) 11/13/2023    Pneumococcal Polysaccharide (PPSV23) 09/21/2021    Shingrix 09/21/2021, 04/07/2022    Td, Unspecified 09/11/2008    Tdap 04/07/2022           REVIEW OF SYSTEMS:    Review of Systems   Constitutional: Positive for fever.   HENT: Negative.     Eyes: Negative.    Cardiovascular:  Positive for chest pain.   Respiratory:  Positive for cough and shortness of breath. Negative for sputum production.    Skin: Negative.    Musculoskeletal: Negative.    Gastrointestinal: Negative.    Genitourinary: Negative.    Neurological: Negative.    Psychiatric/Behavioral: Negative.       Vital Signs  Temp:  [97.6 °F (36.4 °C)-98.3 °F (36.8 °C)] 98 °F (36.7 °C)  Heart Rate:  [65-84] 72  Resp:  [16] 16  BP: (127-168)/(56-88) 127/88          Physical Exam:  Physical Exam  Vitals reviewed.   Constitutional:       General: She is not in acute distress.     Appearance: Normal appearance. She is obese. She is not ill-appearing, toxic-appearing or diaphoretic.   HENT:      Head: Normocephalic.      Right Ear: External ear normal.      Left Ear: External ear normal.      Nose: Nose normal.      Mouth/Throat:      Mouth: Mucous membranes are moist.   Eyes:      Extraocular Movements: Extraocular movements intact.   Cardiovascular:      Rate and Rhythm: Normal rate and regular rhythm.      Pulses: Normal pulses.   Pulmonary:      Effort: Pulmonary effort is normal.      Breath sounds: Normal breath sounds.   Abdominal:      General: Bowel sounds are normal.      Palpations: Abdomen is soft.   Musculoskeletal:         General: Normal range of motion.      Cervical back: Normal range of motion.      Right lower leg: No edema.      Left lower leg: No edema.   Skin:     General: Skin is warm and dry.      Capillary Refill: Capillary refill takes less than 2 seconds.   Neurological:      General: No focal deficit present.      Mental Status: She is alert.   Psychiatric:         Mood and Affect: Mood  normal.         Behavior: Behavior normal.         Thought Content: Thought content normal.         Judgment: Judgment normal.           Emotional Behavior:   Normal   Debilities:  None  Results Review:    I reviewed the patient's new clinical results.  Lab Results (most recent)       Procedure Component Value Units Date/Time    Basic Metabolic Panel [[id number]]  (Abnormal) Collected: 02/21/24 0411    Specimen: Blood from Arm, Right Updated: 02/21/24 0531     Glucose 126 mg/dL      BUN 13 mg/dL      Creatinine 0.64 mg/dL      Sodium 142 mmol/L      Potassium 4.3 mmol/L      Chloride 104 mmol/L      CO2 27.0 mmol/L      Calcium 9.4 mg/dL      BUN/Creatinine Ratio 20.3     Anion Gap 11.0 mmol/L      eGFR 100.1 mL/min/1.73     Narrative:      GFR Normal >60  Chronic Kidney Disease <60  Kidney Failure <15      CBC Auto Differential [[id number]]  (Abnormal) Collected: 02/21/24 0411    Specimen: Blood from Arm, Right Updated: 02/21/24 0518     WBC 6.10 10*3/mm3      RBC 4.79 10*6/mm3      Hemoglobin 13.1 g/dL      Hematocrit 40.3 %      MCV 84.0 fL      MCH 27.3 pg      MCHC 32.5 g/dL      RDW 14.4 %      RDW-SD 45.1 fl      MPV 7.9 fL      Platelets 215 10*3/mm3      Neutrophil % 51.1 %      Lymphocyte % 31.8 %      Monocyte % 12.5 %      Eosinophil % 3.6 %      Basophil % 1.0 %      Neutrophils, Absolute 3.10 10*3/mm3      Lymphocytes, Absolute 1.90 10*3/mm3      Monocytes, Absolute 0.80 10*3/mm3      Eosinophils, Absolute 0.20 10*3/mm3      Basophils, Absolute 0.10 10*3/mm3      nRBC 0.1 /100 WBC     High Sensitivity Troponin T 2Hr [[id number]]  (Normal) Collected: 02/20/24 1636    Specimen: Blood Updated: 02/20/24 1706     HS Troponin T 11 ng/L      Troponin T Delta 0 ng/L     Narrative:      High Sensitive Troponin T Reference Range:  <14.0 ng/L- Negative Female for AMI  <22.0 ng/L- Negative Male for AMI  >=14 - Abnormal Female indicating possible myocardial injury.  >=22 - Abnormal Male indicating possible myocardial  injury.   Clinicians would have to utilize clinical acumen, EKG, Troponin, and serial changes to determine if it is an Acute Myocardial Infarction or myocardial injury due to an underlying chronic condition.         High Sensitivity Troponin T [[id number]]  (Normal) Collected: 02/20/24 1411    Specimen: Blood Updated: 02/20/24 1625     HS Troponin T 11 ng/L     Narrative:      High Sensitive Troponin T Reference Range:  <14.0 ng/L- Negative Female for AMI  <22.0 ng/L- Negative Male for AMI  >=14 - Abnormal Female indicating possible myocardial injury.  >=22 - Abnormal Male indicating possible myocardial injury.   Clinicians would have to utilize clinical acumen, EKG, Troponin, and serial changes to determine if it is an Acute Myocardial Infarction or myocardial injury due to an underlying chronic condition.         BNP [[id number]]  (Normal) Collected: 02/20/24 1411    Specimen: Blood Updated: 02/20/24 1610     proBNP 42.4 pg/mL     Narrative:      This assay is used as an aid in the diagnosis of individuals suspected of having heart failure. It can be used as an aid in the diagnosis of acute decompensated heart failure (ADHF) in patients presenting with signs and symptoms of ADHF to the emergency department (ED). In addition, NT-proBNP of <300 pg/mL indicates ADHF is not likely.    Age Range Result Interpretation  NT-proBNP Concentration (pg/mL:      <50             Positive            >450                   Gray                 300-450                    Negative             <300    50-75           Positive            >900                  Gray                300-900                  Negative            <300      >75             Positive            >1800                  Gray                300-1800                  Negative            <300    CBC & Differential [[id number]]  (Normal) Collected: 02/20/24 1411    Specimen: Blood Updated: 02/20/24 1544    Narrative:      The following orders were created for panel  order CBC & Differential.  Procedure                               Abnormality         Status                     ---------                               -----------         ------                     CBC Auto Differential[[id number]]        Normal              Final result                 Please view results for these tests on the individual orders.    CBC Auto Differential [[id number]]  (Normal) Collected: 02/20/24 1411    Specimen: Blood Updated: 02/20/24 1544     WBC 6.90 10*3/mm3      RBC 5.13 10*6/mm3      Hemoglobin 14.1 g/dL      Hematocrit 42.6 %      MCV 83.0 fL      MCH 27.4 pg      MCHC 33.0 g/dL      RDW 14.2 %      RDW-SD 44.2 fl      MPV 8.1 fL      Platelets 247 10*3/mm3      Neutrophil % 60.0 %      Lymphocyte % 26.4 %      Monocyte % 10.6 %      Eosinophil % 2.1 %      Basophil % 0.9 %      Neutrophils, Absolute 4.10 10*3/mm3      Lymphocytes, Absolute 1.80 10*3/mm3      Monocytes, Absolute 0.70 10*3/mm3      Eosinophils, Absolute 0.10 10*3/mm3      Basophils, Absolute 0.10 10*3/mm3      nRBC 0.0 /100 WBC     Comprehensive Metabolic Panel [[id number]]  (Abnormal) Collected: 02/20/24 1411    Specimen: Blood Updated: 02/20/24 1520     Glucose 113 mg/dL      BUN 10 mg/dL      Creatinine 0.77 mg/dL      Sodium 142 mmol/L      Potassium 3.9 mmol/L      Chloride 105 mmol/L      CO2 27.0 mmol/L      Calcium 10.2 mg/dL      Total Protein 7.6 g/dL      Albumin 4.2 g/dL      ALT (SGPT) 53 U/L      AST (SGOT) 50 U/L      Alkaline Phosphatase 117 U/L      Total Bilirubin 0.3 mg/dL      Globulin 3.4 gm/dL      A/G Ratio 1.2 g/dL      BUN/Creatinine Ratio 13.0     Anion Gap 10.0 mmol/L      eGFR 87.3 mL/min/1.73     Narrative:      GFR Normal >60  Chronic Kidney Disease <60  Kidney Failure <15      Lipase [[id number]]  (Normal) Collected: 02/20/24 1411    Specimen: Blood Updated: 02/20/24 1520     Lipase 38 U/L     Urinalysis With Microscopic If Indicated (No Culture) - Urine, Clean Catch [[id number]]  (Normal)  Collected: 02/20/24 1455    Specimen: Urine, Clean Catch Updated: 02/20/24 1519     Color, UA Yellow     Appearance, UA Clear     pH, UA <=5.0     Specific Gravity, UA 1.008     Glucose, UA Negative     Ketones, UA Negative     Bilirubin, UA Negative     Blood, UA Negative     Protein, UA Negative     Leuk Esterase, UA Negative     Nitrite, UA Negative     Urobilinogen, UA 0.2 E.U./dL    Narrative:      Urine microscopic not indicated.    [place] Draw [[id number]] Collected: 02/20/24 1411    Specimen: Blood Updated: 02/20/24 1515    Narrative:      The following orders were created for panel order [place] Draw.  Procedure                               Abnormality         Status                     ---------                               -----------         ------                     Green Top (Gel)[[id number]]                                  Final result               Lavender Top[[id number]]                                     Final result               Gold Top - SST[[id number]]                                   Final result               Light Blue Top[[id number]]                                   Final result                 Please view results for these tests on the individual orders.    Green Top (Gel) [[id number]] Collected: 02/20/24 1411    Specimen: Blood Updated: 02/20/24 1515     Extra Tube Hold for add-ons.     Comment: Auto resulted.       Lavender Top [[id number]] Collected: 02/20/24 1411    Specimen: Blood Updated: 02/20/24 1515     Extra Tube hold for add-on     Comment: Auto resulted       Gold Top - SST [[id number]] Collected: 02/20/24 1411    Specimen: Blood Updated: 02/20/24 1515     Extra Tube Hold for add-ons.     Comment: Auto resulted.       Light Blue Top [[id number]] Collected: 02/20/24 1411    Specimen: Blood Updated: 02/20/24 1515     Extra Tube Hold for add-ons.     Comment: Auto resulted               Imaging Results (Most Recent)       Procedure Component Value Units Date/Time    XR Chest 1  View [[id number]] Collected: 02/20/24 1503     Updated: 02/20/24 1507    Narrative:      XR CHEST 1 VW    Date of Exam: 2/20/2024 2:51 PM EST    Indication: cp/soa    Comparison: 9/10/2023    Findings:  There are low lung volumes. Heart size and central pulmonary vessels appear prominent. This may be accentuated by low lung volumes and AP technique. No pneumothorax identified. Question small pleural effusions.      Impression:      Impression:    1. Question cardiomegaly and central pulmonary vascular congestion. This may be accentuated by technique as above.  2. Blunting CP angle suggesting small amounts of pleural fluid.      Electronically Signed: Julio Pereira MD    2/20/2024 3:05 PM EST    Workstation ID: [id number]          reviewed    ECG/EMG Results (most recent)       Procedure Component Value Units Date/Time    ECG 12 Lead Chest Pain [[id number]] Collected: 02/20/24 1348     Updated: 02/21/24 0752     QT Interval 382 ms      QTC Interval 431 ms     Narrative:      HEART RATE= 77  bpm  RR Interval= 784  ms  IA Interval= 175  ms  P Horizontal Axis= 9  deg  P Front Axis= 21  deg  QRSD Interval= 93  ms  QT Interval= 382  ms  QTcB= 431  ms  QRS Axis= 25  deg  T Wave Axis= 19  deg  - NORMAL ECG -  Sinus rhythm  When compared with ECG of 15-Feb-2023 9:30:13,  No significant change  Electronically Signed By: Marek Carroll ([place]) 21-Feb-2024 07:52:42  Date and Time of Study: 2024-02-20 13:48:26    SCANNED - TELEMETRY   [[id number]] Resulted: 02/20/24     Updated: 02/21/24 0828          reviewed    Results for orders placed during the hospital encounter of 11/19/21    Duplex Carotid Ultrasound CAR    Interpretation Summary  · Right internal carotid artery is normal.  · Left internal carotid artery is normal.      Results for orders placed during the hospital encounter of 02/21/23    Adult Transthoracic Echo Complete W/ Cont if Necessary Per Protocol    Interpretation Summary    Left ventricular ejection fraction appears  to be 56 - 60%.    The right ventricular cavity is mildly dilated.    No pericardial effusion noted      Microbiology Results (last 10 days)       ** No results found for the last 240 hours. **            Assessment & Plan     Chest pain       Chest pain with dyspnea and a history of COPD  Lab Results   Component Value Date    TROPONINT 11 02/20/2024    TROPONINT 11 02/20/2024    TROPONINT 8 02/15/2023   -proBNP: 42.4  -Lipid panel showed total cholesterol 152 with an LDL of 88 and HDL 35  -Chest X-ray: Reported with questional cardiomegaly and central pulmonary vascular congestion possibly accentuated by technique with blunting of the costophrenic angle suggesting small amounts of pleural fluid also noted  -EKG: Sinus rhythm at 77 without obvious acute changes or ectopy with a QTc of 431 ms  -In the ED pt given morphine, Zofran  -Stress Test showed normal myocardial perfusion imaging without evidence of ischemia consistent with a low risk study  -Echocardiogram ordered  -Telemetry  -NPO  -Continue statin, beta-blocker, aspirin, Trelegy and albuterol    Hypertension  -Well controlled   BP Readings from Last 1 Encounters:   02/21/24 127/88   - Continue amlodipine, lisinopril and resume metoprolol following stress test  - Monitor while admitted    Hyperlipidemia  -Statin    GERD  -PPI    Morbid obesity (BMI: 42.34)  -Encourage continued diet lifestyle modification  -Patient recently prescribed though has not started Zepbound    I discussed the patients findings and my recommendations with patient and nursing staff.     Discharge Diagnosis:      Chest pain      Hospital Course  Patient is a 63 y.o. female presented with chest pain and dyspnea with an HPI noted above.  Serial troponins were assessed and found to be 11, 11 with a proBNP within normal limits at 42.4.  Lipid panel was assessed and showed total cholesterol 152 with an LDL of 88 and HDL 35.  Chest x-ray was reported by radiologist as showing questionable  cardiomegaly and central pulmonary vascular congestion though they note that this is possibly accentuated by technique with blunting of the costophrenic angles suggesting a small amount of pleural fluid also present.  She received morphine as well as Zofran in the ED.  Stress testing was performed on 2/21/2024 which showed normal myocardial perfusion imaging without evidence of ischemia consistent with a low risk study.  Echocardiogram was also ordered however could not be completed on the day of discharge with patient reporting improvement in symptoms.  Patient has appointment scheduled with cardiologist in the coming weeks and also notes some recent increase in her GI symptoms as well.  She will be started on trial of Carafate.  At this time patient is felt to be in good condition for discharge with close follow-up with her PCP as well as cardiology as well as her pulmonologist on an outpatient basis.  She is instructed to continue to monitor and log heart rates and blood pressures to discuss with PCP and cardiology next visit.  Her full testing/results and plan were discussed with patient along with concerning/alarm symptoms for which to call 911/return to the ED.  All questions were answered and she verbalizes her understanding and agreement.    Past Medical History:     Past Medical History:   Diagnosis Date    Allergic rhinitis     Anemia 04/2020    Asthma 11/04/2011    Calcaneal spur of foot, right 02/20/2021    XRAY AT [facility]    Cancer     Appendcele mucous neoplasm    Colon polyp Unknown    Constipation 07/11/2012    Contact dermatitis 07/16/2015    Depression 05/28/2013    Disease of thyroid gland     HYPOTHYROIDISM    Diverticulitis 02/01/2012    Dyspnea 07/13/2020    SEEN AT [facility] ER    Gastroesophageal reflux disease 05/28/2013    Hyperlipidemia     Hypertension     MRSA infection 04/25/2020    Myalgia and myositis 09/13/2011    Obesity 2003    Osteoarthritis 10/30/2014     Ovarian cyst 04/23/2013    Ovarian enlargement, left     Plantar fasciitis, right 02/20/2021    SEEN AT [facility] ER    Renal insufficiency Unknown    Seasonal allergies     Sleep apnea 05/21/2021    NO CPAP       Past Surgical History:     Past Surgical History:   Procedure Laterality Date    APPENDECTOMY N/A 03/20/2020    LAPAROSCOPIC, DR. WILLIAM CHEADLE AT [place]    CARDIAC CATHETERIZATION Left 10/02/2009    No Intervention    CHOLECYSTECTOMY N/A 02/08/2005    DR. CHRIS LOZA AT [place] [facility]    COLON RESECTION LEFT Left 2010    D/T RUPTURED DIVERTICULA, DR. DEA CROWE    COLON RESECTION SMALL BOWEL N/A 04/07/2020    LAPAROSCOPY, MOBILIZATION OF RIGHT COLON, CONVERSION TO OPEN WITH RIGHT TRANSVERSE INCISION, EXTENSIVE LYSIS OF ADHESIONS, AND ILEOCECTOMY, DR. WILLIAM CHEADLE AT [facility]    COLONOSCOPY N/A 03/2012    WITH ILEOSCOPY    COLONOSCOPY N/A 11/09/2020    A FEW DIVERTICULA IN DESCENDING AND SIGMOID, MILD INTERNAL HEMORRHOIDS, RLQ ANASTAMOSIS, BX: REACTIVE ILEAL MUCOSA WITH MILD CHRONIC ILEITIS, RESCOPE IN 3 YRS, DR. AUSTEN NÚÑEZ AT [facility]    COLONOSCOPY W/ POLYPECTOMY N/A 02/24/2017    BULBUOUS APPENDIX, POLYPS    DIAGNOSTIC LAPAROSCOPY N/A 1993    FOR ENDOMETRIOSIS    DIAGNOSTIC LAPAROSCOPY N/A 1992    FOR ENDOMETRIOSIS    ENDOSCOPY AND COLONOSCOPY N/A 2009    ERCP WITH SPHINCTEROTOMY/PAPILLOTOMY N/A 2005    WITH STENT    FOOT SURGERY Right 2003    HEMICOLECTOMY Left 06/13/2012    LAPAROSCOPIC LEFT AND SIGMOID HEMICOLECTOMY WITH TRANSVERSE RECTAL ANASTAMOSIS AND LAPAROSCOPIC TAKEDOWN OF SPLENIC FLEXURE, D/T DIVERTICULAR PERFORATION WITH ABSCESS, DR. ABY MCFARLAND AT [facility]    HYSTERECTOMY N/A 1992    ROZINA WITH BSO    SMALL INTESTINE SURGERY  2021    SUBTOTAL HYSTERECTOMY  1992       Social History:   Social History     Socioeconomic History    Marital status:    Tobacco Use    Smoking status: Never     Passive exposure: Past    Smokeless tobacco: Never    Tobacco comments:      My father was a heavy smoker, and my brothers all smoke   Vaping Use    Vaping Use: Never used   Substance and Sexual Activity    Alcohol use: Yes     Alcohol/week: 1.0 standard drink of alcohol     Types: 1 Glasses of wine per week     Comment: rarely    Drug use: Never    Sexual activity: Yes     Partners: Male     Birth control/protection: Surgical, Post-menopausal, Hysterectomy, Same-sex partner     Comment: My        Procedures Performed         Consults:   Consults       No orders found for last 30 day(s).            Condition on Discharge:     Stable    Discharge Disposition      Discharge Medications     Discharge Medications        New Medications        Instructions Start Date   naltrexone-bupropion ER 8-90 MG tablet  Commonly known as: CONTRAVE   Wk 1: 1 tab daily, Wk 2: 1 tab twice a day, Wk 3: 2 tabs in AM, 1 tab in PM, Wk 4: 2 tabs twice a day, Maintenance dose: 2 tabs twice daily.      sucralfate 1 g tablet  Commonly known as: CARAFATE   1 g, Oral, 3 Times Daily Before Meals             Continue These Medications        Instructions Start Date   albuterol sulfate  (90 Base) MCG/ACT inhaler  Commonly known as: PROVENTIL HFA;VENTOLIN HFA;PROAIR HFA   2 puffs, Inhalation, Every 4 Hours PRN      amLODIPine 5 MG tablet  Commonly known as: NORVASC   5 mg, Oral, Daily      atorvastatin 20 MG tablet  Commonly known as: LIPITOR   40 mg, Oral, Nightly      cetirizine 10 MG tablet  Commonly known as: zyrTEC   10 mg, Oral, Daily      fluticasone 50 MCG/ACT nasal spray  Commonly known as: FLONASE   2 sprays, Nasal, Daily      lisinopril 40 MG tablet  Commonly known as: PRINIVIL,ZESTRIL   40 mg, Oral, Daily      metoprolol tartrate 50 MG tablet  Commonly known as: LOPRESSOR   50 mg, Oral, 2 Times Daily      multivitamin tablet tablet   1 tablet, Oral, Daily      omeprazole 40 MG capsule  Commonly known as: priLOSEC   40 mg, Oral, Daily      Tirzepatide-Weight Management 2.5 MG/0.5ML solution  auto-injector  Commonly known as: ZEPBOUND   2.5 mg, Subcutaneous, Weekly      Trelegy Ellipta 100-62.5-25 MCG/ACT inhaler  Generic drug: Fluticasone-Umeclidin-Vilant   1 puff, Inhalation, Daily - RT               Discharge Diet:     Activity at Discharge:     Follow-up Appointments  Future Appointments   Date Time Provider Department Center   3/6/2024  1:20 PM Andrade Oreilly MD [facility]   3/9/2024 10:00 AM [facility]   5/13/2024  8:30 AM Sybil Dangelo APRN [facility]   8/20/2024 10:30 AM Sybil Dangelo APRN [facility]     Additional Instructions for the Follow-ups that You Need to Schedule       Discharge Follow-up with PCP   As directed       Currently Documented PCP:    Sybil Dangelo APRN    PCP Phone Number:    [phone]     Follow Up Details: 5-7 days        Discharge Follow-up with Specified Provider: Cardiology   As directed      To: Cardiology   Follow Up Details: As scheduled        Discharge Follow-up with Specified Provider: Pulmonology; 1 Month   As directed      To: Pulmonology   Follow Up: 1 Month                Test Results Pending at Discharge       Risk for Readmission (LACE) Score: 3 (2/21/2024  6:00 AM)      Greater than 30 minutes spent in discharge activities for this patient    Signature:Electronically signed by Krishna Hill PA-C, 02/21/24, 1:23 PM EST.

## 2024-02-21 NOTE — CASE MANAGEMENT/SOCIAL WORK
Discharge Planning Assessment   [facility]     Patient Name: Eve Garrett  MRN: [id number]  Today's Date: 2/21/2024    Admit Date: 2/20/2024    Plan: Routine home   Discharge Needs Assessment       Row Name 02/21/24 1107       Living Environment    People in Home spouse    Name(s) of People in Home  Adebayo    Current Living Arrangements home    Potentially Unsafe Housing Conditions none    In the past 12 months has the electric, gas, oil, or water company threatened to shut off services in your home? No    Primary Care Provided by self    Provides Primary Care For no one    Family Caregiver if Needed spouse    Family Caregiver Names Adebayo    Quality of Family Relationships helpful;involved;supportive    Able to Return to Prior Arrangements yes       Resource/Environmental Concerns    Resource/Environmental Concerns none    Transportation Concerns none       Transportation Needs    In the past 12 months, has lack of transportation kept you from medical appointments or from getting medications? no    In the past 12 months, has lack of transportation kept you from meetings, work, or from getting things needed for daily living? No       Food Insecurity    Within the past 12 months, you worried that your food would run out before you got the money to buy more. Never true    Within the past 12 months, the food you bought just didn't last and you didn't have money to get more. Never true       Transition Planning    Patient/Family Anticipates Transition to home with family    Patient/Family Anticipated Services at Transition none    Transportation Anticipated car, drives self;family or friend will provide       Discharge Needs Assessment    Readmission Within the Last 30 Days no previous admission in last 30 days    Equipment Currently Used at Home bp cuff;cpap  CPAP- [employer]    Concerns to be Addressed denies needs/concerns at this time;no discharge needs identified    Anticipated Changes Related to Illness none     Equipment Needed After Discharge none                   Discharge Plan       Row Name 02/21/24 1108       Plan    Plan Routine home    Plan Comments CM met with patient and spouse at the bedside. Confirmed PCP, insurance, and pharmacy. Patient denies any difficulty affording medications. Patient is not current with any HHC/OPPT/OT services. Patient lives at home with spouse, is IADLS, and drives.  will provide DC transport. DC Barriers: myoview and echo results pending.              Demographic Summary       Row Name 02/21/24 1107       General Information    Admission Type observation    Arrived From emergency department    Referral Source admission list    Reason for Consult discharge planning    Preferred Language English       Contact Information    Permission Granted to Share Info With     Contact Information Obtained for                    Functional Status       Row Name 02/21/24 1107       Functional Status    Usual Activity Tolerance good    Current Activity Tolerance good       Functional Status, IADL    Medications independent    Meal Preparation independent    Housekeeping independent    Laundry independent    Shopping independent       Mental Status    General Appearance WDL WDL       Mental Status Summary    Recent Changes in Mental Status/Cognitive Functioning no changes             Jesse Case RN     Cell number [phone]  Office number [phone]

## 2024-02-21 NOTE — TELEPHONE ENCOUNTER
Denied.   Coverage is provided in situations where the patient is greater than or equal to 18 years of age  and has tried and failed or has a contraindication to phentermine, Qsymia or its individual generic  components, and Contrave or its individual generic components. Coverage cannot be authorized  at this time. Other coverage conditions may apply.    Please advise.

## 2024-02-21 NOTE — OUTREACH NOTE
Prep Survey      Flowsheet Row Responses   [facility] patient discharged from? [facility]   Is LACE score < 7 ? Yes   Eligibility [facility]   Date of Admission 02/20/24   Date of Discharge 02/21/24   Discharge Disposition Home or Self Care   Discharge diagnosis Chest pain   Does the patient have one of the following disease processes/diagnoses(primary or secondary)? Other   Does the patient have Home health ordered? No   Is there a DME ordered? No   Prep survey completed? Yes            MEIR FALL - Registered Nurse

## 2024-02-22 ENCOUNTER — TRANSITIONAL CARE MANAGEMENT TELEPHONE ENCOUNTER (OUTPATIENT)
Dept: CALL CENTER | Facility: HOSPITAL | Age: 64
End: 2024-02-22
Payer: OTHER GOVERNMENT

## 2024-02-22 NOTE — CASE MANAGEMENT/SOCIAL WORK
Case Management Discharge Note      Final Note: Routine home         Selected Continued Care - Discharged on 2/21/2024 Admission date: 2/20/2024 - Discharge disposition: Home or Self Care         Transportation Services  Private: Car    Final Discharge Disposition Code: 01 - home or self-care

## 2024-02-22 NOTE — OUTREACH NOTE
Call Center TCM Note      Flowsheet Row Responses   [facility] patient discharged from? [facility]   Does the patient have one of the following disease processes/diagnoses(primary or secondary)? Other   TCM attempt successful? Yes   Call start time 1246   Call end time 1252   Discharge diagnosis Chest pain   Meds reviewed with patient/caregiver? Yes   Is the patient having any side effects they believe may be caused by any medication additions or changes? No   Does the patient have all medications ordered at discharge? Yes   Is the patient taking all medications as directed (includes completed medication regime)? Yes   Comments [facility] d/c follow up 2/28/24@1000.   Does the patient have an appointment with their PCP within 7-14 days of discharge? Yes   Has home health visited the patient within 72 hours of discharge? N/A   Psychosocial issues? No   Did the patient receive a copy of their discharge instructions? Yes   Nursing interventions Reviewed instructions with patient   What is the patient's perception of their health status since discharge? Same   Is the patient/caregiver able to teach back signs and symptoms related to disease process for when to call PCP? Yes   Is the patient/caregiver able to teach back signs and symptoms related to disease process for when to call 911? Yes   Is the patient/caregiver able to teach back the hierarchy of who to call/visit for symptoms/problems? PCP, Specialist, Home health nurse, Urgent Care, ED, 911 Yes   TCM call completed? Yes   Wrap up additional comments Patient reports chest pain remains about the same. Cardiology appt scheduled for 3/6/24.   Call end time 1252   Would this patient benefit from a Referral to Amb Social Work? No   Is the patient interested in additional calls from an ambulatory ? No            Khushboo Ely RN    2/22/2024, 12:53 EST

## 2024-02-27 NOTE — PROGRESS NOTES
"Transitional Care Follow Up Visit  Subjective     Eve Garrett is a 63 y.o. female who presents for a transitional care management visit.    Within 48 business hours after discharge our office contacted her via telephone to coordinate her care and needs.      I reviewed and discussed the details of that call along with the discharge summary, hospital problems, inpatient lab results, inpatient diagnostic studies, and consultation reports with Eve.     Current outpatient and discharge medications have been reconciled for the patient.  Reviewed by: Sybil Dangelo, APRN          2/21/2024     5:35 PM   Date of TCM Phone Call   [facility]   Date of Admission 2/20/2024   Date of Discharge 2/21/2024   Discharge Disposition Home or Self Care     Risk for Readmission (LACE) Score: 3 (2/21/2024  6:00 AM)      History of Present Illness   Course During Hospital Stay:  Hospital course per hospital records: \"Patient is a 63 y.o. female presented with chest pain and dyspnea with an HPI noted above.  Serial troponins were assessed and found to be 11, 11 with a proBNP within normal limits at 42.4.  Lipid panel was assessed and showed total cholesterol 152 with an LDL of 88 and HDL 35.  Chest x-ray was reported by radiologist as showing questionable cardiomegaly and central pulmonary vascular congestion though they note that this is possibly accentuated by technique with blunting of the costophrenic angles suggesting a small amount of pleural fluid also present.  She received morphine as well as Zofran in the ED.  Stress testing was performed on 2/21/2024 which showed normal myocardial perfusion imaging without evidence of ischemia consistent with a low risk study.  Echocardiogram was also ordered however could not be completed on the day of discharge with patient reporting improvement in symptoms.  Patient has appointment scheduled with cardiologist in the coming weeks and also notes some recent increase in her GI symptoms " "as well.  She will be started on trial of Carafate.  At this time patient is felt to be in good condition for discharge with close follow-up with her PCP as well as cardiology as well as her pulmonologist on an outpatient basis.  She is instructed to continue to monitor and log heart rates and blood pressures to discuss with PCP and cardiology next visit.  Her full testing/results and plan were discussed with patient along with concerning/alarm symptoms for which to call 911/return to the ED.  All questions were answered and she verbalizes her understanding and agreement. \"    Currently on lisinopril 40 mg daily; amlodipine 5 mg daily; metoprolol 50 mg bid for HTN.  Pharmacy filled amlodipine 10 mg daily yesterday; she's not sure when/who changed dose.  She has had only one dose of amlodipine 10 mg.  She is monitoring BP at home but forget log today; has seen systolic number range from 140-160.  She is 150/83 today.  Denies any Cp; palpitations; SOA; dizziness; headache; trouble with vision.    March 6th appt with Dr. Oreilly (cardio).    Scheduled with pulmonology in May.      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  She was seeing him every 6 months until he recently left his practice.  Has not been established with a new provider.  She recently found out her brother has multiple types of cancer and his oncologist recommended genetic testing for any siblings.         The following portions of the patient's history were reviewed and updated as appropriate: allergies, current medications, past family history, past medical history, past social history, past surgical history, and problem list.    Review of Systems   Constitutional:  Negative for chills, fatigue and fever.   Respiratory:  Negative for cough, shortness of breath and wheezing.    Cardiovascular:  Negative for chest pain and palpitations.   Gastrointestinal:  Negative for diarrhea, nausea and vomiting.   Genitourinary:  " Negative for dysuria, frequency and urgency.   Neurological:  Negative for weakness, numbness and headaches.   Psychiatric/Behavioral:  Negative for dysphoric mood. The patient is not nervous/anxious.        Objective   Physical Exam  Vitals reviewed.   Constitutional:       General: She is not in acute distress.     Appearance: Normal appearance. She is obese.   Cardiovascular:      Rate and Rhythm: Normal rate and regular rhythm.      Pulses: Normal pulses.      Heart sounds: Normal heart sounds. No murmur heard.  Pulmonary:      Effort: Pulmonary effort is normal. No respiratory distress.      Breath sounds: Normal breath sounds. No wheezing, rhonchi or rales.   Chest:      Chest wall: No tenderness.   Abdominal:      Tenderness: There is no right CVA tenderness or left CVA tenderness.   Skin:     General: Skin is warm and dry.      Findings: No erythema.   Neurological:      General: No focal deficit present.      Mental Status: She is alert and oriented to person, place, and time.   Psychiatric:         Mood and Affect: Mood normal.         Assessment & Plan   Diagnoses and all orders for this visit:    1. Essential hypertension (Primary)  Comments:  Continue amlodipine 10 mg daily as well as lisinopril and metoprolol.  Call in BP readings from home in one week.   Keep follow up with cardiology.    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  Orders:  -     Ambulatory Referral to Oncology

## 2024-02-28 ENCOUNTER — OFFICE VISIT (OUTPATIENT)
Dept: FAMILY MEDICINE CLINIC | Facility: CLINIC | Age: 64
End: 2024-02-28
Payer: OTHER GOVERNMENT

## 2024-02-28 VITALS
SYSTOLIC BLOOD PRESSURE: 150 MMHG | HEART RATE: 70 BPM | OXYGEN SATURATION: 93 % | WEIGHT: 264 LBS | BODY MASS INDEX: 42.43 KG/M2 | DIASTOLIC BLOOD PRESSURE: 83 MMHG | HEIGHT: 66 IN

## 2024-02-28 DIAGNOSIS — I10 ESSENTIAL HYPERTENSION: Primary | ICD-10-CM

## 2024-02-28 DIAGNOSIS — D37.3 LOW GRADE MUCINOUS NEOPLASM OF APPENDIX: ICD-10-CM

## 2024-02-28 RX ORDER — AMLODIPINE BESYLATE 10 MG/1
10 TABLET ORAL DAILY
COMMUNITY
End: 2024-03-04 | Stop reason: SDUPTHER

## 2024-03-04 RX ORDER — AMLODIPINE BESYLATE 10 MG/1
10 TABLET ORAL DAILY
Qty: 90 TABLET | Refills: 1 | Status: SHIPPED | OUTPATIENT
Start: 2024-03-04

## 2024-03-06 ENCOUNTER — TELEPHONE (OUTPATIENT)
Dept: FAMILY MEDICINE CLINIC | Facility: CLINIC | Age: 64
End: 2024-03-06
Payer: OTHER GOVERNMENT

## 2024-03-06 NOTE — TELEPHONE ENCOUNTER
Pt is having trouble getting RX for Contrave,  She goes to [facility], and they told her that we need to request a PA form and a CMN. With their new system, they are unable to do it..

## 2024-03-06 NOTE — TELEPHONE ENCOUNTER
Pt will be using [facility] to fill her Contrave. I spoke with [facility] and their system was down and are having to work around filling and billing medications. If a medication needs a prior authorization(hers does), they cannot try to fill without having letter of approval first. I will submit PA request and will need to fax them approval once we receive.   Fax to pharmacy: [phone]  PA has been started on covermymeds.   Key: [id number]  PA Case ID #: [id number]  [facility] PA Form

## 2024-03-07 NOTE — TELEPHONE ENCOUNTER
Originally denied. Had to resubmit with additional information.   Approved.   Key: [id number]  PA Case ID #: [id number]    CaseId:[id number];Status:Approved;Review Type:Prior Auth;Coverage Start Date:02/06/2024;Coverage End Date:07/05/2024;

## 2024-03-09 ENCOUNTER — HOSPITAL ENCOUNTER (OUTPATIENT)
Dept: MRI IMAGING | Facility: HOSPITAL | Age: 64
Discharge: HOME | End: 2024-03-09
Admitting: NURSE PRACTITIONER
Payer: OTHER GOVERNMENT

## 2024-03-09 DIAGNOSIS — M54.16 LUMBAR RADICULOPATHY, CHRONIC: ICD-10-CM

## 2024-03-09 PROCEDURE — 72148 MRI LUMBAR SPINE W/O DYE: CPT

## 2024-03-11 NOTE — PROGRESS NOTES
Left message to return call to doctor's office at [facility]. Either to get test results or message from provider.

## 2024-03-12 DIAGNOSIS — M54.40 CHRONIC LOW BACK PAIN WITH SCIATICA, SCIATICA LATERALITY UNSPECIFIED, UNSPECIFIED BACK PAIN LATERALITY: Primary | ICD-10-CM

## 2024-03-12 DIAGNOSIS — G89.29 CHRONIC LOW BACK PAIN WITH SCIATICA, SCIATICA LATERALITY UNSPECIFIED, UNSPECIFIED BACK PAIN LATERALITY: Primary | ICD-10-CM

## 2024-03-20 ENCOUNTER — OFFICE VISIT (OUTPATIENT)
Dept: CARDIOLOGY | Facility: CLINIC | Age: 64
End: 2024-03-20
Payer: OTHER GOVERNMENT

## 2024-03-20 VITALS
WEIGHT: 266 LBS | OXYGEN SATURATION: 98 % | HEART RATE: 63 BPM | DIASTOLIC BLOOD PRESSURE: 82 MMHG | BODY MASS INDEX: 42.75 KG/M2 | HEIGHT: 66 IN | SYSTOLIC BLOOD PRESSURE: 137 MMHG

## 2024-03-20 DIAGNOSIS — G47.33 OBSTRUCTIVE SLEEP APNEA: ICD-10-CM

## 2024-03-20 DIAGNOSIS — I10 PRIMARY HYPERTENSION: Primary | ICD-10-CM

## 2024-03-20 DIAGNOSIS — E78.00 PURE HYPERCHOLESTEROLEMIA: ICD-10-CM

## 2024-03-20 DIAGNOSIS — R00.2 PALPITATIONS: ICD-10-CM

## 2024-03-20 NOTE — PROGRESS NOTES
Subjective:     Encounter Date:03/20/2024      Patient ID: Eve Garrett is a 63 y.o. female.    Chief Complaint:  History of Present Illness 63-year-old white female with history of hypertension hyperlipidemia sleep apnea presents to my office for a follow-up.  Patient was in the hospital recently with complaint of chest pain and had a stress Myoview study which did not show any ischemia.  Patient does not any symptoms of chest pain but still has some shortness of breath and occasional dizziness.  No complaint of any palpitations.  She also has some swelling of the feet.  She is taking her medicine regularly.  She does not smoke    The following portions of the patient's history were reviewed and updated as appropriate: allergies, current medications, past family history, past medical history, past social history, past surgical history, and problem list.  Past Medical History:   Diagnosis Date    Allergic     Allergic rhinitis     Anemia 04/2020    Asthma 11/04/2011    Calcaneal spur of foot, right 02/20/2021    XRAY AT [facility]    Cancer     Appendcele mucous neoplasm    Colon polyp Unknown    Constipation 07/11/2012    Contact dermatitis 07/16/2015    Depression 05/28/2013    Disease of thyroid gland     HYPOTHYROIDISM    Diverticulitis 02/01/2012    Diverticulosis     Dyspnea 07/13/2020    SEEN AT [facility]    Gastroesophageal reflux disease 05/28/2013    Headache     HL (hearing loss)     Hyperlipidemia     Hypertension     Irritable bowel syndrome     Low back pain     MRSA infection 04/25/2020    Myalgia and myositis 09/13/2011    Obesity 2003    Osteoarthritis 10/30/2014    Ovarian cyst 04/23/2013    Ovarian enlargement, left     Plantar fasciitis, right 02/20/2021    SEEN AT [facility]    Pneumonia     Renal insufficiency Unknown    Seasonal allergies     Sleep apnea 05/21/2021    NO CPAP    Visual impairment      Past Surgical History:   Procedure Laterality Date     "APPENDECTOMY N/A 03/20/2020    LAPAROSCOPIC, DR. WILLIAM CHEADLE AT [place]    CARDIAC CATHETERIZATION Left 10/02/2009    No Intervention    CHOLECYSTECTOMY N/A 02/08/2005    DR. CHRIS LOZA AT [facility]    COLON RESECTION LEFT Left 2010    D/T RUPTURED DIVERTICULA, DR. DEA CROWE    COLON RESECTION SMALL BOWEL N/A 04/07/2020    LAPAROSCOPY, MOBILIZATION OF RIGHT COLON, CONVERSION TO OPEN WITH RIGHT TRANSVERSE INCISION, EXTENSIVE LYSIS OF ADHESIONS, AND ILEOCECTOMY, DR. WILLIAM CHEADLE AT [place]    COLON SURGERY      COLONOSCOPY N/A 03/2012    WITH ILEOSCOPY    COLONOSCOPY N/A 11/09/2020    A FEW DIVERTICULA IN DESCENDING AND SIGMOID, MILD INTERNAL HEMORRHOIDS, RLQ ANASTAMOSIS, BX: REACTIVE ILEAL MUCOSA WITH MILD CHRONIC ILEITIS, RESCOPE IN 3 YRS, DR. AUSTEN NÚÑEZ AT [facility]    COLONOSCOPY W/ POLYPECTOMY N/A 02/24/2017    BULBUOUS APPENDIX, POLYPS    DIAGNOSTIC LAPAROSCOPY N/A 1993    FOR ENDOMETRIOSIS    DIAGNOSTIC LAPAROSCOPY N/A 1992    FOR ENDOMETRIOSIS    ENDOSCOPY AND COLONOSCOPY N/A 2009    ERCP WITH SPHINCTEROTOMY/PAPILLOTOMY N/A 2005    WITH STENT    FOOT SURGERY Right 2003    HEMICOLECTOMY Left 06/13/2012    LAPAROSCOPIC LEFT AND SIGMOID HEMICOLECTOMY WITH TRANSVERSE RECTAL ANASTAMOSIS AND LAPAROSCOPIC TAKEDOWN OF SPLENIC FLEXURE, D/T DIVERTICULAR PERFORATION WITH ABSCESS, DR. ABY MCFARLAND AT [facility]    HYSTERECTOMY N/A 1992    ROZINA WITH BSO    SMALL INTESTINE SURGERY  2021    SUBTOTAL HYSTERECTOMY  1992     /82   Pulse 63   Ht 167.6 cm (65.98\")   Wt 121 kg (266 lb)   SpO2 98%   BMI 42.95 kg/m²   Family History   Problem Relation Age of Onset    Cancer Mother     Liver cancer Mother     Basal cell carcinoma Mother     Diabetes Mother     Cancer Father     Lung cancer Father     Hypertension Father         And also my mother had as well as my self    Celiac disease Father     Heart disease Father     Diverticulitis Sister     Hypertension Sister     Cancer Brother         " BLADDER CANCER    Cancer Brother     Stomach cancer Brother     Cancer Maternal Aunt     Ovarian cancer Maternal Aunt     Cancer Paternal Aunt     Breast cancer Paternal Aunt     Cancer Paternal Uncle     Stomach cancer Paternal Uncle     Heart disease Maternal Grandmother     Heart disease Paternal Grandmother     Heart failure Paternal Grandmother        Current Outpatient Medications:     albuterol sulfate  (90 Base) MCG/ACT inhaler, Inhale 2 puffs Every 4 (Four) Hours As Needed for Wheezing., Disp: 18 g, Rfl: 6    amLODIPine (NORVASC) 10 MG tablet, Take 1 tablet by mouth Daily., Disp: 90 tablet, Rfl: 1    atorvastatin (LIPITOR) 20 MG tablet, Take 2 tablets by mouth Every Night., Disp: 180 tablet, Rfl: 3    cetirizine (zyrTEC) 10 MG tablet, Take 1 tablet by mouth Daily., Disp: , Rfl:     fluticasone (FLONASE) 50 MCG/ACT nasal spray, 2 sprays into the nostril(s) as directed by provider Daily., Disp: 3 g, Rfl: 3    Fluticasone-Umeclidin-Vilant (Trelegy Ellipta) 100-62.5-25 MCG/ACT inhaler, Inhale 1 puff Daily., Disp: 3 each, Rfl: 3    lisinopril (PRINIVIL,ZESTRIL) 40 MG tablet, Take 1 tablet by mouth Daily., Disp: 90 tablet, Rfl: 3    metoprolol tartrate (LOPRESSOR) 50 MG tablet, Take 1 tablet by mouth 2 (Two) Times a Day., Disp: 180 tablet, Rfl: 3    multivitamin (THERAGRAN) tablet tablet, Take 1 tablet by mouth Daily., Disp: , Rfl:     omeprazole (priLOSEC) 40 MG capsule, Take 1 capsule by mouth Daily., Disp: 90 capsule, Rfl: 3    naltrexone-bupropion ER (CONTRAVE) 8-90 MG tablet, Wk 1: 1 tab daily, Wk 2: 1 tab twice a day, Wk 3: 2 tabs in AM, 1 tab in PM, Wk 4: 2 tabs twice a day, Maintenance dose: 2 tabs twice daily., Disp: 120 tablet, Rfl: 1    sucralfate (CARAFATE) 1 g tablet, Take 1 tablet by mouth 3 (Three) Times a Day Before Meals for 30 days., Disp: 90 tablet, Rfl: 0  Allergies   Allergen Reactions    Promethazine Anxiety and Hallucinations     AKA PHENERGAN, Delusions, hallucinations     Social  History     Socioeconomic History    Marital status:    Tobacco Use    Smoking status: Never     Passive exposure: Past    Smokeless tobacco: Never    Tobacco comments:     My father was a heavy smoker, and my brothers all smoke   Vaping Use    Vaping status: Never Used   Substance and Sexual Activity    Alcohol use: Yes     Alcohol/week: 1.0 standard drink of alcohol     Types: 1 Glasses of wine per week     Comment: rarely    Drug use: Never    Sexual activity: Yes     Partners: Male     Birth control/protection: Post-menopausal, Hysterectomy, Same-sex partner, Surgical     Comment: My      Review of Systems   Constitutional: Negative for malaise/fatigue.   Cardiovascular:  Positive for leg swelling. Negative for chest pain, dyspnea on exertion and palpitations.   Respiratory:  Positive for shortness of breath. Negative for cough.    Gastrointestinal:  Negative for abdominal pain, nausea and vomiting.   Neurological:  Positive for light-headedness and numbness. Negative for dizziness, focal weakness and headaches.   All other systems reviewed and are negative.             Objective:     Constitutional:       Appearance: Well-developed.   Eyes:      General: No scleral icterus.     Conjunctiva/sclera: Conjunctivae normal.   HENT:      Head: Normocephalic and atraumatic.   Neck:      Vascular: No carotid bruit or JVD.   Pulmonary:      Effort: Pulmonary effort is normal.      Breath sounds: Normal breath sounds. No wheezing. No rales.   Cardiovascular:      Normal rate. Regular rhythm.   Pulses:     Intact distal pulses.   Abdominal:      General: Bowel sounds are normal.      Palpations: Abdomen is soft.   Musculoskeletal:      Cervical back: Normal range of motion and neck supple. Skin:     General: Skin is warm and dry.      Findings: No rash.   Neurological:      Mental Status: Alert.       Procedures    Lab Review:         MDM    #1 palpitation  Patient has history of arrhythmias but is currently  stable on beta-blockers  2.  Hypertension  Patient is on currently multiple medications her blood pressure is stable  3.  Hyperlipidemia  Patient on atorvastatin and the lipid levels are well within normal limits  4.  Sleep apnea  Patient is sleep apnea and uses a CPAP machine.    Patient's previous medical records, labs, and EKG were reviewed and discussed with the patient at today's visit.

## 2024-03-21 ENCOUNTER — TELEPHONE (OUTPATIENT)
Dept: FAMILY MEDICINE CLINIC | Facility: CLINIC | Age: 64
End: 2024-03-21
Payer: OTHER GOVERNMENT

## 2024-03-21 DIAGNOSIS — D37.3 LOW GRADE MUCINOUS NEOPLASM OF APPENDIX: Primary | ICD-10-CM

## 2024-03-21 NOTE — TELEPHONE ENCOUNTER
Pt is requesting referral to see Dr. Bubba Deluna oncologist in [place]  [address] [place] [address] Dr MYNOR [address]    phone number [phone]  Fax: [phone]

## 2024-03-22 ENCOUNTER — PATIENT MESSAGE (OUTPATIENT)
Dept: FAMILY MEDICINE CLINIC | Facility: CLINIC | Age: 64
End: 2024-03-22
Payer: OTHER GOVERNMENT

## 2024-04-12 ENCOUNTER — TELEPHONE (OUTPATIENT)
Dept: ORTHOPEDICS | Facility: OTHER | Age: 64
End: 2024-04-12
Payer: OTHER GOVERNMENT

## 2024-05-07 ENCOUNTER — TELEPHONE (OUTPATIENT)
Dept: FAMILY MEDICINE CLINIC | Facility: CLINIC | Age: 64
End: 2024-05-07

## 2024-05-07 DIAGNOSIS — G47.30 SLEEP APNEA, UNSPECIFIED TYPE: Primary | ICD-10-CM

## 2024-05-07 NOTE — TELEPHONE ENCOUNTER
Caller: Eve Garrtet    Relationship: Self    Best call back number: [phone]     What is the medical concern/diagnosis: SLEEP APNEA     What specialty or service is being requested: PULMONOLOGY     What is the provider, practice or medical service name: DR. POORNIMA ORTEGA    What is the office location: [address] [place] [address]    What is the office phone number: [phone]    Any additional details: PLEASE CALL PATIENT ONCE REFERRAL HAS BEEN PLACED.

## 2024-05-07 NOTE — TELEPHONE ENCOUNTER
Pt states the office of Dr. Meadows says the can't see her until she get a referral. Pt says it's probably for a renewal.

## 2024-05-07 NOTE — TELEPHONE ENCOUNTER
It looks like she is already established with Dr. Meadows.   Is she needing referral as a renewal??

## 2024-05-12 NOTE — PROGRESS NOTES
Subjective   Eve Garrett is a 63 y.o. female.       HPI   Pt is here today for a 6 month follow up.   1) HTN - currently on lisinopril 40 mg daily; metoprolol 50 mg bid; amlodipine 10 mg daily.  Denies any CP; palpitations; dizziness; headache; trouble with vision.  Just took medications less than an hour ago; BP improved slightly with recheck.     2) Hyperlipidemia - currently on atorvastatin 40 mg daily. No concerns. ; Trigs 166; HDL 35; LDL 88 (Feb 2024).   3) Asthma - currently on Trelegy inhaler; albuterol PRN.  She reports frequently feeling SOA with wheezes with any type of exertion/exercise. Followed by pulmonology.  Says she has been using CPAP.    4) GERD - currently on omeprazole 40 mg daily. No concerns.   5) Anemia - has stopped ferrous sulfate daily.  Being followed by hematology.  Hgb 13.1 (Feb 2024)  6) Allergies/Asthma - currently on Trelegy inhaler; montelukast 10 mg daily; cetrizine 10 mg daily;  and Flonase daily.  7) Started Contrave for weight loss one month ago.  Weight is down 10 pounds.  Current weight is 255; BMI is 41.  Has started chair yoga and walking more.  Working on healthy diet.   8) Skin lesion -  on left forearm.  She feels like it has grown in size.      The following portions of the patient's history were reviewed and updated as appropriate: allergies, current medications, past family history, past medical history, past social history, past surgical history, and problem list.    Review of Systems   Constitutional:  Negative for chills, fatigue and fever.   Respiratory:  Negative for cough, shortness of breath and wheezing.    Cardiovascular:  Negative for chest pain and palpitations.   Gastrointestinal:  Negative for abdominal pain, blood in stool, constipation, diarrhea, nausea, vomiting and indigestion.   Genitourinary:  Negative for dysuria, frequency, hematuria and urgency.   Skin:  Positive for skin lesions.   Neurological:  Negative for dizziness, weakness,  light-headedness and headache.   Psychiatric/Behavioral:  Negative for depressed mood. The patient is not nervous/anxious.        Objective   Physical Exam  Vitals reviewed.   Constitutional:       General: She is not in acute distress.     Appearance: Normal appearance. She is obese.   Cardiovascular:      Rate and Rhythm: Normal rate and regular rhythm.      Pulses: Normal pulses.      Heart sounds: Normal heart sounds. No murmur heard.  Pulmonary:      Effort: Pulmonary effort is normal. No respiratory distress.      Breath sounds: Normal breath sounds. No wheezing or rhonchi.   Chest:      Chest wall: No tenderness.   Abdominal:      Tenderness: There is no right CVA tenderness or left CVA tenderness.   Skin:     General: Skin is warm and dry.      Findings: Lesion (Left forearm - approx 2 mm flesh colored lesion with dry texture.) present. No erythema.   Neurological:      General: No focal deficit present.      Mental Status: She is alert and oriented to person, place, and time.   Psychiatric:         Mood and Affect: Mood normal.                  Procedures   Assessment & Plan   Diagnoses and all orders for this visit:    1. Primary hypertension (Primary)  Comments:  Improved slightly with recheck.   Pt asked to monitor BP at home this week and call in readings on Friday for review.    2. Mixed hyperlipidemia  Comments:  Stable.   Cont. current medication.   Labs reviewed.   RTO in 6 mo.    3. Gastroesophageal reflux disease, unspecified whether esophagitis present  Comments:  Stable.   Cont. current medication.   Labs reviewed.   RTO in 6 mo.    4. Iron deficiency anemia, unspecified iron deficiency anemia type  Comments:  Stable.   Cont. f/u with hematology.    5. Environmental and seasonal allergies  Comments:  Stable.   Cont. current medication.   Labs reviewed.   RTO in 6 mo.    6. Mild persistent asthma without complication  Comments:  Stable.   Cont. current medication.   Labs reviewed.   RTO in 6  mo.    7. Skin lesion  Comments:  Referral given to Derm  Orders:  -     Ambulatory Referral to Dermatology    8. Seasonal allergies  Comments:  Stable.   Cont. current medication.  Orders:  -     fluticasone (FLONASE) 50 MCG/ACT nasal spray; 2 sprays into the nostril(s) as directed by provider Daily.  Dispense: 18.2 mL; Refill: 3    9. Class 3 severe obesity due to excess calories with serious comorbidity and body mass index (BMI) of 40.0 to 44.9 in adult  Comments:  Weight is down 10 pounds.   Cont. Contrave.   Cont. working on healthy diet; aim for 150 min exercise weekly.   RTO in 3-4 mo.  Orders:  -     naltrexone-bupropion ER (CONTRAVE) 8-90 MG tablet; Take 2 tablets by mouth 2 (Two) Times a Day.  Dispense: 120 tablet; Refill: 3

## 2024-05-13 ENCOUNTER — OFFICE VISIT (OUTPATIENT)
Dept: FAMILY MEDICINE CLINIC | Facility: CLINIC | Age: 64
End: 2024-05-13
Payer: OTHER GOVERNMENT

## 2024-05-13 VITALS
HEIGHT: 66 IN | DIASTOLIC BLOOD PRESSURE: 90 MMHG | OXYGEN SATURATION: 95 % | SYSTOLIC BLOOD PRESSURE: 160 MMHG | TEMPERATURE: 98.2 F | BODY MASS INDEX: 41.11 KG/M2 | WEIGHT: 255.8 LBS | HEART RATE: 60 BPM

## 2024-05-13 DIAGNOSIS — K21.9 GASTROESOPHAGEAL REFLUX DISEASE, UNSPECIFIED WHETHER ESOPHAGITIS PRESENT: ICD-10-CM

## 2024-05-13 DIAGNOSIS — D50.9 IRON DEFICIENCY ANEMIA, UNSPECIFIED IRON DEFICIENCY ANEMIA TYPE: ICD-10-CM

## 2024-05-13 DIAGNOSIS — J30.2 SEASONAL ALLERGIES: ICD-10-CM

## 2024-05-13 DIAGNOSIS — J30.89 ENVIRONMENTAL AND SEASONAL ALLERGIES: ICD-10-CM

## 2024-05-13 DIAGNOSIS — E78.2 MIXED HYPERLIPIDEMIA: ICD-10-CM

## 2024-05-13 DIAGNOSIS — J45.30 MILD PERSISTENT ASTHMA WITHOUT COMPLICATION: ICD-10-CM

## 2024-05-13 DIAGNOSIS — E66.01 CLASS 3 SEVERE OBESITY DUE TO EXCESS CALORIES WITH SERIOUS COMORBIDITY AND BODY MASS INDEX (BMI) OF 40.0 TO 44.9 IN ADULT: ICD-10-CM

## 2024-05-13 DIAGNOSIS — L98.9 SKIN LESION: ICD-10-CM

## 2024-05-13 DIAGNOSIS — I10 PRIMARY HYPERTENSION: Primary | ICD-10-CM

## 2024-05-13 PROCEDURE — 99214 OFFICE O/P EST MOD 30 MIN: CPT | Performed by: NURSE PRACTITIONER

## 2024-05-13 RX ORDER — ONDANSETRON 4 MG/1
TABLET, ORALLY DISINTEGRATING ORAL
COMMUNITY

## 2024-05-13 RX ORDER — FLUTICASONE PROPIONATE 50 MCG
2 SPRAY, SUSPENSION (ML) NASAL DAILY
Qty: 18.2 ML | Refills: 3 | Status: SHIPPED | OUTPATIENT
Start: 2024-05-13

## 2024-05-13 RX ORDER — DICYCLOMINE HCL 20 MG
180 TABLET ORAL
COMMUNITY
End: 2024-05-13

## 2024-05-13 RX ORDER — MONTELUKAST SODIUM 10 MG/1
TABLET ORAL
COMMUNITY

## 2024-05-13 RX ORDER — PSYLLIUM HUSK (WITH SUGAR) 3.4 G/7 G
POWDER (GRAM) ORAL
COMMUNITY

## 2024-05-24 ENCOUNTER — TREATMENT (OUTPATIENT)
Dept: PHYSICAL THERAPY | Facility: CLINIC | Age: 64
End: 2024-05-24
Payer: OTHER GOVERNMENT

## 2024-05-24 ENCOUNTER — TELEPHONE (OUTPATIENT)
Dept: FAMILY MEDICINE CLINIC | Facility: CLINIC | Age: 64
End: 2024-05-24

## 2024-05-24 DIAGNOSIS — G89.29 CHRONIC LOW BACK PAIN WITH SCIATICA, SCIATICA LATERALITY UNSPECIFIED, UNSPECIFIED BACK PAIN LATERALITY: Primary | ICD-10-CM

## 2024-05-24 DIAGNOSIS — M54.40 CHRONIC LOW BACK PAIN WITH SCIATICA, SCIATICA LATERALITY UNSPECIFIED, UNSPECIFIED BACK PAIN LATERALITY: Primary | ICD-10-CM

## 2024-05-24 PROCEDURE — 97535 SELF CARE MNGMENT TRAINING: CPT | Performed by: PHYSICAL THERAPIST

## 2024-05-24 PROCEDURE — G0283 ELEC STIM OTHER THAN WOUND: HCPCS | Performed by: PHYSICAL THERAPIST

## 2024-05-24 PROCEDURE — 97110 THERAPEUTIC EXERCISES: CPT | Performed by: PHYSICAL THERAPIST

## 2024-05-24 NOTE — TELEPHONE ENCOUNTER
Caller: Eve Garrett    Relationship to patient: Self    Best call back number: [phone]    Patient is needing:   GOT A CALL TO SCHEDULE WITH [facility].     THEY STATE THEY RECEIVED THE REFERRAL BUT THEY ARE NEEDING THE INSURANCE AUTHORIZATION INFO BEFORE THEY CAN SCHEDULE.     PLEASE CALL TO DISCUSS.

## 2024-05-24 NOTE — PROGRESS NOTES
Physical Therapy Initial Evaluation and Plan of Care    Patient: Eve Garrett   : 1960  Diagnosis/ICD-10 Code:  Chronic low back pain with sciatica, sciatica laterality unspecified, unspecified back pain laterality [M54.40, G89.29]  Referring practitioner: CRUZ Chavez  Date of Initial Visit: 2024  Today's Date: 2024  Patient seen for 1 sessions           Subjective Questionnaire: Oswestry: 20%      Subjective Evaluation    History of Present Illness  Mechanism of injury: Pt is referred to therapy due to chronic LBP.  Reports R sided back pain for about a year.  It has been getting worse.  Denies any LE symptoms except for numbness in L upper thigh and R side of the abd.  Pt has had several abdominal surgeries. 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  She had surgery to remove it and part of her colon. She had surgery to remove part of the colon due to severe IBS a few years ago.  She still has a tumor in the R side of her abd but it is not malignant. They are just watching it.  Reports for almost 2 years she didn't do anything at all. She had to wear an abd brace and pretty much sat and didn't do anything.      Onset of symptoms : about a year    Aggravating factors: prolonged standing    Relieving factors: rest and sit down, doing nothing    Functional limitation: she likes to bake and is not able to stand long enough to bake or cook.     PMH: ca of appendix, had partial removal of large intestine, diverticulitis, several abd surgeries due to diverticulitis and endometriosis, hysterectomy, gallbladder surgery.          Patient Occupation: she is in  Quality of life: good    Pain  Current pain ratin  At best pain ratin  At worst pain ratin  Progression: worsening    Social Support  Lives with: spouse    Patient Goals  Patient goals for therapy: decreased pain, increased motion, increased strength and return to  sport/leisure activities           Precautions: hx of abd cancer, no US    Objective          Postural Observations  Seated posture: fair  Standing posture: fair      Palpation     Right Tenderness of the lumbar paraspinals.     Additional Palpation Details  Mod TTP R lower lumbar region    Active Range of Motion     Additional Active Range of Motion Details  Standing lumbar flex: min limitation , increase pain in R lower lumbar region    Standing lumbar ext: mod limitation , inc pain across LB , more on R side    Supine flex:  limitation is due to ms approximation, tightness and pulling in R side and R hip    Prone ext: mod limitation, increase pain R side and across LB    SLR: neg B    Flexibility: mod tightness B HS/ piriformis    LE ROM/ strength: wfl B           Assessment & Plan       Assessment  Impairments: abnormal or restricted ROM, activity intolerance, lacks appropriate home exercise program, pain with function and weight-bearing intolerance   Functional limitations: lifting, walking, pulling, pushing, uncomfortable because of pain and standing   Assessment details: Pt is a 63 y.o. female referred to therapy due to chronic R sided lower back pain. Pt presents with impaired spinal mobility, mod TTP R lumbar region, decrease tolerance to standing. Pt has had multiple abd surgeries and still has a benign tumor in R side of her abd.  Her back pain seems to be muscular and could be related to abd ms weakness, scar tissue or compression from the existing tumor.  Upon initial evaluation pt exhibits the above impairments and functional limitations. 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 Pt is a good candidate for rehab and will benefit from skilled physical therapy to address impairments, resolve pain and maximize function.    Prognosis: good    Goals  Plan Goals: STGs:  In 4 weeks  1- Pt will  report at least 25 % improvement in functional mobility and pain reduction   2- Pt will be independent with  initial HEP   3- Pt will tolerate progression of her exercise program and HEP without increase symptoms      LTGs: By DC   1- Pt will report at least 75 % improvement in functional mobility and pain reduction   2- Pt will be independent with final HEP and self management of her condition   3- Pt will have improved Oswestry score  indicating functional improvement , pain and symptom reduction  4- Pt will voice readiness for DC with independent program       Plan  Therapy options: will be seen for skilled therapy services  Planned modality interventions: high voltage pulsed current (pain management) and ultrasound  Planned therapy interventions: abdominal trunk stabilization, balance/weight-bearing training, body mechanics training, functional ROM exercises, home exercise program, manual therapy, neuromuscular re-education, postural training, strengthening and therapeutic activities  Treatment plan discussed with: patient        See flow sheet for treatment detail    History # of Personal Factors and/or Comorbidities: MODERATE (1-2)  Examination of Body System(s): # of elements: MODERATE (3)  Clinical Presentation: EVOLVING  Clinical Decision Making: MODERATE          Timed:         Manual Therapy:         mins  52710;     Therapeutic Exercise:  15       mins  05802;     Neuromuscular Ya:        mins  20563;    Therapeutic Activity:          mins  04895;     Gait Training:           mins  49963;     Ultrasound:          mins  17943;    Ionto                                   mins   21597  Self Care                      8      mins   65694        Un-Timed:  Electrical Stimulation:         mins  95544 ( );  Dry Needling          mins self-pay  Traction          mins 03027  Canal repositioning           mins    17405  Low Eval          Mins  21977  Mod Eval    30      Mins  17374  High Eval                            Mins  03471  Re-Eval                               mins  61279        Timed Treatment:  23     mins   Total Treatment:    53    mins    PT SIGNATURE: Mario Dupree PT, CLT  [place] License: # [id number]     DATE TREATMENT INITIATED: 5/24/2024    Initial Certification  Certification Period: 5/24/2024 thru 8/21/2024  I certify that the therapy services are furnished while this patient is under my care.  The services outlined above are required by this patient, and will be reviewed every 90 days.     PHYSICIAN: Sybil Dangelo APRN   NPI: [id number]                                      DATE:     Please sign and return via fax to [phone].. Thank you, [facility].

## 2024-05-29 ENCOUNTER — DOCUMENTATION (OUTPATIENT)
Dept: PHYSICAL THERAPY | Facility: CLINIC | Age: 64
End: 2024-05-29
Payer: OTHER GOVERNMENT

## 2024-05-29 NOTE — PROGRESS NOTES
Discharge Summary from PhysicalTherapy         Patient: Eve Garrett   : 1960  Today's Date: 2024    Patient seen for 1 visit  Dates of Service: 24      Comments : pt only came for her initial evaluation and cancelled all scheduled appts. She has not returned since the evaluation. Pt will be DC from our clinic.       Thank you for this referral to [facility].    SIGNATURE: Mario Dupree PT

## 2024-05-31 DIAGNOSIS — Z12.12 SCREENING FOR COLORECTAL CANCER: Primary | ICD-10-CM

## 2024-05-31 DIAGNOSIS — Z12.11 SCREENING FOR COLORECTAL CANCER: Primary | ICD-10-CM

## 2024-06-11 ENCOUNTER — TRANSCRIBE ORDERS (OUTPATIENT)
Dept: ADMINISTRATIVE | Facility: HOSPITAL | Age: 64
End: 2024-06-11
Payer: OTHER GOVERNMENT

## 2024-06-11 DIAGNOSIS — D37.3 LOW GRADE MUCINOUS NEOPLASM OF APPENDIX: Primary | ICD-10-CM

## 2024-06-26 ENCOUNTER — HOSPITAL ENCOUNTER (OUTPATIENT)
Dept: MRI IMAGING | Facility: HOSPITAL | Age: 64
Discharge: HOME | End: 2024-06-26
Admitting: NURSE PRACTITIONER
Payer: OTHER GOVERNMENT

## 2024-06-26 ENCOUNTER — APPOINTMENT (OUTPATIENT)
Dept: MRI IMAGING | Facility: HOSPITAL | Age: 64
End: 2024-06-26
Payer: OTHER GOVERNMENT

## 2024-06-26 DIAGNOSIS — D37.3 LOW GRADE MUCINOUS NEOPLASM OF APPENDIX: ICD-10-CM

## 2024-06-26 PROCEDURE — 25010000002 GADOTERIDOL PER 1 ML: Performed by: NURSE PRACTITIONER

## 2024-06-26 PROCEDURE — A9579 GAD-BASE MR CONTRAST NOS,1ML: HCPCS | Performed by: NURSE PRACTITIONER

## 2024-06-26 PROCEDURE — 74183 MRI ABD W/O CNTR FLWD CNTR: CPT

## 2024-06-26 RX ADMIN — GADOTERIDOL 20 ML: 279.3 INJECTION, SOLUTION INTRAVENOUS at 14:57

## 2024-07-05 ENCOUNTER — TELEPHONE (OUTPATIENT)
Dept: FAMILY MEDICINE CLINIC | Facility: CLINIC | Age: 64
End: 2024-07-05
Payer: OTHER GOVERNMENT

## 2024-07-05 ENCOUNTER — TRANSCRIBE ORDERS (OUTPATIENT)
Dept: ADMINISTRATIVE | Facility: HOSPITAL | Age: 64
End: 2024-07-05
Payer: OTHER GOVERNMENT

## 2024-07-05 DIAGNOSIS — D49.0 DUDLEY-KLINGENSTEIN SYNDROME: Primary | ICD-10-CM

## 2024-07-05 DIAGNOSIS — D37.3 LOW GRADE MUCINOUS NEOPLASM OF APPENDIX: Primary | ICD-10-CM

## 2024-07-05 NOTE — TELEPHONE ENCOUNTER
Vee came into the office today and said that when she saw her cancer doctor- Dr Hamilton and they are referring her to a  but per her insurance the referral needs to come from her PCP. She is going to be seeing Janna Tillman MS,GC at [facility] on [address], [address] , [place], [place].(P)[phone]. She is scheduled to see her on Tuesday 7/9/24 @ 10 AM.

## 2024-07-23 ENCOUNTER — TELEPHONE (OUTPATIENT)
Dept: FAMILY MEDICINE CLINIC | Facility: CLINIC | Age: 64
End: 2024-07-23
Payer: OTHER GOVERNMENT

## 2024-07-24 NOTE — TELEPHONE ENCOUNTER
Spoke with pharmacy. PA is needed for Contrave. If denied, pt can get Bupropion 100 mg and Naltrexone 50 mg without PA if provider is okay with ordering separately.   Phone for plan: [phone]  PA request submitted on covermymeds. Waiting on PA questions to populate to complete request.

## 2024-07-25 NOTE — TELEPHONE ENCOUNTER
PA denied.   Coverage for continuation of therapy is provided in situations where the patient has lost GREATER  THAN or EQUAL to 5 percent of baseline body weight since starting medication.    Per pharmacy Bupropion 100 mg and Naltrexone 50 mg should be covered if ordered separately if you are comfortable ordering them that way. If not, she has an appt in August, do you want her to follow up then and if weight shows 5% or more weight loss since beginning weight, we can resubmit the request?

## 2024-08-08 PROBLEM — Z86.010 PERSONAL HISTORY OF COLONIC POLYPS: Status: ACTIVE | Noted: 2017-02-24

## 2024-08-20 NOTE — PROGRESS NOTES
Subjective   Eve Garrett is a 64 y.o. female.       HPI   Pt is here today for a 6 month follow up.   1) HTN - currently on lisinopril 40 mg daily; metoprolol 50 mg bid; amlodipine 10 mg daily.  Denies any CP; palpitations; dizziness; headache; trouble with vision.   Not checking BP regularly at home.    2) Hyperlipidemia - currently on atorvastatin 40 mg daily. No concerns.   3) GERD - currently on omeprazole 40 mg daily. No concerns.   4) Anemia -   Being followed by hematology.  Hgb 13.3 (June 2024)  5) Allergies/Asthma - currently on Trelegy inhaler; montelukast 10 mg daily; cetrizine 10 mg daily;  and Flonase daily.  6) Obesity- was on Contrave for weight loss.  Insurance denied last PA; she'd like to try getting it again.  Current weight is 247 ; BMI is 39.  Weight is down 8 pounds since May. Continues to do yoga at home for exercise. Working on healthy diet.   Phentermine or Qysmia would not be appropriate for her due to her HTN.    7) Colonoscopy scheduled for Jan 2025.     The following portions of the patient's history were reviewed and updated as appropriate: allergies, current medications, past family history, past medical history, past social history, past surgical history, and problem list.    Review of Systems   Constitutional:  Negative for chills, fatigue and fever.   Eyes:  Negative for visual disturbance.   Respiratory:  Negative for cough, shortness of breath and wheezing.    Cardiovascular:  Negative for chest pain and palpitations.   Gastrointestinal:  Negative for abdominal pain, blood in stool, constipation, diarrhea, nausea, vomiting and indigestion.   Endocrine: Negative for polydipsia, polyphagia and polyuria.   Genitourinary:  Negative for dysuria, frequency, hematuria and urgency.   Neurological:  Negative for dizziness, weakness, light-headedness and headache.   Psychiatric/Behavioral:  Negative for depressed mood. The patient is not nervous/anxious.        Objective   Physical  Exam  Vitals reviewed.   Constitutional:       General: She is not in acute distress.     Appearance: Normal appearance. She is obese.   Cardiovascular:      Rate and Rhythm: Normal rate and regular rhythm.      Pulses: Normal pulses.      Heart sounds: Normal heart sounds. No murmur heard.  Pulmonary:      Effort: Pulmonary effort is normal. No respiratory distress.      Breath sounds: Normal breath sounds. No wheezing or rhonchi.   Chest:      Chest wall: No tenderness.   Abdominal:      Tenderness: There is no right CVA tenderness or left CVA tenderness.   Skin:     General: Skin is warm and dry.   Neurological:      General: No focal deficit present.      Mental Status: She is alert and oriented to person, place, and time.   Psychiatric:         Mood and Affect: Mood normal.                  Procedures   Assessment & Plan   Diagnoses and all orders for this visit:    1. Primary hypertension (Primary)  Comments:  Improved with recheck.   Discussed home monitoring; send in readings for review in 1 week.   Cont. current medication.   Labs ordered.   RTO in 6 mo  Orders:  -     Comprehensive metabolic panel; Future  -     Lipid panel; Future    2. Mixed hyperlipidemia  Comments:  Stable.   Cont. current medication.   Labs ordered.   RTO in 6 mo.  Orders:  -     Comprehensive metabolic panel; Future  -     Lipid panel; Future    3. Gastroesophageal reflux disease, unspecified whether esophagitis present  Comments:  Stable.   Cont. current medication.   Labs ordered.   RTO in 6 mo.    4. Iron deficiency anemia, unspecified iron deficiency anemia type  Comments:  Stable.   Cont. current medication.   Keep f/u with hematology.    5. Mild persistent asthma without complication  Comments:  Stable.   Cont. current medication.   Labs ordered.   RTO in 6 mo.    6. Environmental and seasonal allergies  Comments:  Stable.   Cont. current medication.   Labs ordered.   RTO in 6 mo.    7. Class 2 severe obesity due to excess  calories with serious comorbidity and body mass index (BMI) of 39.0 to 39.9 in adult  Comments:  Given Contrave.    Work on healthy diet; aim for 150 min exercise weekly.   RTO in 4 weeks.  Orders:  -     naltrexone-bupropion ER (CONTRAVE) 8-90 MG tablet; Take 2 tablets by mouth 2 (Two) Times a Day.  Dispense: 120 tablet; Refill: 3    8. Prediabetes  Comments:  Labs ordered.  Orders:  -     Hemoglobin A1c; Future

## 2024-08-23 ENCOUNTER — LAB (OUTPATIENT)
Dept: FAMILY MEDICINE CLINIC | Facility: CLINIC | Age: 64
End: 2024-08-23
Payer: OTHER GOVERNMENT

## 2024-08-23 ENCOUNTER — OFFICE VISIT (OUTPATIENT)
Dept: FAMILY MEDICINE CLINIC | Facility: CLINIC | Age: 64
End: 2024-08-23
Payer: OTHER GOVERNMENT

## 2024-08-23 VITALS
BODY MASS INDEX: 39.7 KG/M2 | DIASTOLIC BLOOD PRESSURE: 81 MMHG | WEIGHT: 247 LBS | HEIGHT: 66 IN | HEART RATE: 63 BPM | SYSTOLIC BLOOD PRESSURE: 154 MMHG | OXYGEN SATURATION: 96 %

## 2024-08-23 DIAGNOSIS — E78.2 MIXED HYPERLIPIDEMIA: ICD-10-CM

## 2024-08-23 DIAGNOSIS — I10 PRIMARY HYPERTENSION: Primary | ICD-10-CM

## 2024-08-23 DIAGNOSIS — R73.03 PREDIABETES: ICD-10-CM

## 2024-08-23 DIAGNOSIS — E66.01 CLASS 2 SEVERE OBESITY DUE TO EXCESS CALORIES WITH SERIOUS COMORBIDITY AND BODY MASS INDEX (BMI) OF 39.0 TO 39.9 IN ADULT: ICD-10-CM

## 2024-08-23 DIAGNOSIS — J30.89 ENVIRONMENTAL AND SEASONAL ALLERGIES: ICD-10-CM

## 2024-08-23 DIAGNOSIS — I10 PRIMARY HYPERTENSION: ICD-10-CM

## 2024-08-23 DIAGNOSIS — J45.30 MILD PERSISTENT ASTHMA WITHOUT COMPLICATION: ICD-10-CM

## 2024-08-23 DIAGNOSIS — K21.9 GASTROESOPHAGEAL REFLUX DISEASE, UNSPECIFIED WHETHER ESOPHAGITIS PRESENT: ICD-10-CM

## 2024-08-23 DIAGNOSIS — D50.9 IRON DEFICIENCY ANEMIA, UNSPECIFIED IRON DEFICIENCY ANEMIA TYPE: ICD-10-CM

## 2024-08-23 LAB
ALBUMIN SERPL-MCNC: 4 G/DL (ref 3.5–5.2)
ALBUMIN/GLOB SERPL: 1.4 G/DL
ALP SERPL-CCNC: 120 U/L (ref 39–117)
ALT SERPL W P-5'-P-CCNC: 27 U/L (ref 1–33)
ANION GAP SERPL CALCULATED.3IONS-SCNC: 10 MMOL/L (ref 5–15)
AST SERPL-CCNC: 20 U/L (ref 1–32)
BILIRUB SERPL-MCNC: 0.3 MG/DL (ref 0–1.2)
BUN SERPL-MCNC: 11 MG/DL (ref 8–23)
BUN/CREAT SERPL: 15.9 (ref 7–25)
CALCIUM SPEC-SCNC: 10 MG/DL (ref 8.6–10.5)
CHLORIDE SERPL-SCNC: 107 MMOL/L (ref 98–107)
CHOLEST SERPL-MCNC: 135 MG/DL (ref 0–200)
CO2 SERPL-SCNC: 25 MMOL/L (ref 22–29)
CREAT SERPL-MCNC: 0.69 MG/DL (ref 0.57–1)
EGFRCR SERPLBLD CKD-EPI 2021: 97.7 ML/MIN/1.73
GLOBULIN UR ELPH-MCNC: 2.8 GM/DL
GLUCOSE SERPL-MCNC: 158 MG/DL (ref 65–99)
HBA1C MFR BLD: 6.5 % (ref 4.8–5.6)
HDLC SERPL-MCNC: 34 MG/DL (ref 40–60)
LDLC SERPL CALC-MCNC: 71 MG/DL (ref 0–100)
LDLC/HDLC SERPL: 1.94 {RATIO}
POTASSIUM SERPL-SCNC: 4.4 MMOL/L (ref 3.5–5.2)
PROT SERPL-MCNC: 6.8 G/DL (ref 6–8.5)
SODIUM SERPL-SCNC: 142 MMOL/L (ref 136–145)
TRIGL SERPL-MCNC: 176 MG/DL (ref 0–150)
VLDLC SERPL-MCNC: 30 MG/DL (ref 5–40)

## 2024-08-23 PROCEDURE — 36415 COLL VENOUS BLD VENIPUNCTURE: CPT

## 2024-08-23 PROCEDURE — 80061 LIPID PANEL: CPT | Performed by: NURSE PRACTITIONER

## 2024-08-23 PROCEDURE — 83036 HEMOGLOBIN GLYCOSYLATED A1C: CPT | Performed by: NURSE PRACTITIONER

## 2024-08-23 PROCEDURE — 99214 OFFICE O/P EST MOD 30 MIN: CPT | Performed by: NURSE PRACTITIONER

## 2024-08-23 PROCEDURE — 80053 COMPREHEN METABOLIC PANEL: CPT | Performed by: NURSE PRACTITIONER

## 2024-09-06 DIAGNOSIS — E78.2 MIXED HYPERLIPIDEMIA: ICD-10-CM

## 2024-09-06 DIAGNOSIS — J30.2 SEASONAL ALLERGIES: ICD-10-CM

## 2024-09-06 RX ORDER — OMEPRAZOLE 40 MG/1
40 CAPSULE, DELAYED RELEASE ORAL DAILY
Qty: 90 CAPSULE | Refills: 3 | Status: SHIPPED | OUTPATIENT
Start: 2024-09-06

## 2024-09-06 RX ORDER — MONTELUKAST SODIUM 10 MG/1
10 TABLET ORAL NIGHTLY
Qty: 90 TABLET | Refills: 3 | Status: SHIPPED | OUTPATIENT
Start: 2024-09-06

## 2024-09-06 RX ORDER — AMLODIPINE BESYLATE 10 MG/1
10 TABLET ORAL DAILY
Qty: 90 TABLET | Refills: 3 | Status: SHIPPED | OUTPATIENT
Start: 2024-09-06

## 2024-09-06 RX ORDER — ATORVASTATIN CALCIUM 20 MG/1
40 TABLET, FILM COATED ORAL NIGHTLY
Qty: 180 TABLET | Refills: 3 | Status: SHIPPED | OUTPATIENT
Start: 2024-09-06

## 2024-09-06 RX ORDER — FLUTICASONE PROPIONATE 50 MCG
2 SPRAY, SUSPENSION (ML) NASAL DAILY
Qty: 18.2 ML | Refills: 3 | Status: SHIPPED | OUTPATIENT
Start: 2024-09-06

## 2024-09-06 RX ORDER — ALBUTEROL SULFATE 90 UG/1
2 AEROSOL, METERED RESPIRATORY (INHALATION) EVERY 4 HOURS PRN
Qty: 18 G | Refills: 6 | Status: SHIPPED | OUTPATIENT
Start: 2024-09-06

## 2024-09-06 RX ORDER — FLUTICASONE FUROATE, UMECLIDINIUM BROMIDE AND VILANTEROL TRIFENATATE 100; 62.5; 25 UG/1; UG/1; UG/1
1 POWDER RESPIRATORY (INHALATION)
Qty: 60 EACH | Refills: 3 | Status: SHIPPED | OUTPATIENT
Start: 2024-09-06

## 2024-09-06 RX ORDER — LISINOPRIL 40 MG/1
40 TABLET ORAL DAILY
Qty: 90 TABLET | Refills: 3 | Status: SHIPPED | OUTPATIENT
Start: 2024-09-06

## 2024-09-06 RX ORDER — METOPROLOL TARTRATE 50 MG
50 TABLET ORAL 2 TIMES DAILY
Qty: 180 TABLET | Refills: 3 | Status: SHIPPED | OUTPATIENT
Start: 2024-09-06

## 2024-09-06 NOTE — TELEPHONE ENCOUNTER
Last ov 8/23/24  Scheduled 2/25/25    Patient is requesting 90 day supply on each medication with 3 refils. She needs these printed and she will  to take to [facility]

## 2024-09-25 ENCOUNTER — TELEPHONE (OUTPATIENT)
Dept: FAMILY MEDICINE CLINIC | Facility: CLINIC | Age: 64
End: 2024-09-25
Payer: OTHER GOVERNMENT

## 2024-09-30 ENCOUNTER — TELEPHONE (OUTPATIENT)
Dept: FAMILY MEDICINE CLINIC | Facility: CLINIC | Age: 64
End: 2024-09-30

## 2024-09-30 NOTE — TELEPHONE ENCOUNTER
Patient for flonaise ,trelegy needs 90 day supply and needs the written prescrition before Friday due to she gets her meds at [place].

## 2024-12-21 ENCOUNTER — HOSPITAL ENCOUNTER (OUTPATIENT)
Dept: CT IMAGING | Facility: HOSPITAL | Age: 64
Discharge: HOME | End: 2024-12-21
Payer: OTHER GOVERNMENT

## 2024-12-21 DIAGNOSIS — D49.0 DUDLEY-KLINGENSTEIN SYNDROME: ICD-10-CM

## 2024-12-21 PROCEDURE — 74177 CT ABD & PELVIS W/CONTRAST: CPT

## 2024-12-21 PROCEDURE — 25510000001 IOPAMIDOL PER 1 ML: Performed by: INTERNAL MEDICINE

## 2024-12-21 RX ORDER — IOPAMIDOL 755 MG/ML
100 INJECTION, SOLUTION INTRAVASCULAR
Status: COMPLETED | OUTPATIENT
Start: 2024-12-21 | End: 2024-12-21

## 2024-12-21 RX ADMIN — IOPAMIDOL 100 ML: 755 INJECTION, SOLUTION INTRAVENOUS at 09:44

## 2025-01-03 ENCOUNTER — OFFICE (AMBULATORY)
Dept: URBAN - METROPOLITAN AREA PATHOLOGY 19 | Facility: PATHOLOGY | Age: 65
End: 2025-01-03
Payer: OTHER GOVERNMENT

## 2025-01-03 ENCOUNTER — ON CAMPUS - OUTPATIENT (AMBULATORY)
Dept: URBAN - METROPOLITAN AREA HOSPITAL 2 | Facility: HOSPITAL | Age: 65
End: 2025-01-03
Payer: OTHER GOVERNMENT

## 2025-01-03 VITALS
WEIGHT: 243 LBS | DIASTOLIC BLOOD PRESSURE: 101 MMHG | SYSTOLIC BLOOD PRESSURE: 174 MMHG | RESPIRATION RATE: 15 BRPM | DIASTOLIC BLOOD PRESSURE: 57 MMHG | OXYGEN SATURATION: 99 % | RESPIRATION RATE: 16 BRPM | DIASTOLIC BLOOD PRESSURE: 100 MMHG | DIASTOLIC BLOOD PRESSURE: 77 MMHG | DIASTOLIC BLOOD PRESSURE: 105 MMHG | RESPIRATION RATE: 18 BRPM | HEART RATE: 74 BPM | SYSTOLIC BLOOD PRESSURE: 112 MMHG | DIASTOLIC BLOOD PRESSURE: 61 MMHG | DIASTOLIC BLOOD PRESSURE: 59 MMHG | HEIGHT: 67 IN | OXYGEN SATURATION: 97 % | DIASTOLIC BLOOD PRESSURE: 73 MMHG | SYSTOLIC BLOOD PRESSURE: 118 MMHG | SYSTOLIC BLOOD PRESSURE: 123 MMHG | TEMPERATURE: 96.5 F | SYSTOLIC BLOOD PRESSURE: 128 MMHG | SYSTOLIC BLOOD PRESSURE: 113 MMHG | HEART RATE: 86 BPM | SYSTOLIC BLOOD PRESSURE: 129 MMHG | OXYGEN SATURATION: 95 % | DIASTOLIC BLOOD PRESSURE: 84 MMHG | DIASTOLIC BLOOD PRESSURE: 60 MMHG | HEART RATE: 76 BPM | OXYGEN SATURATION: 96 % | HEART RATE: 72 BPM | OXYGEN SATURATION: 98 % | HEART RATE: 77 BPM | SYSTOLIC BLOOD PRESSURE: 130 MMHG | HEART RATE: 98 BPM | SYSTOLIC BLOOD PRESSURE: 185 MMHG

## 2025-01-03 DIAGNOSIS — D12.4 BENIGN NEOPLASM OF DESCENDING COLON: ICD-10-CM

## 2025-01-03 DIAGNOSIS — D12.3 BENIGN NEOPLASM OF TRANSVERSE COLON: ICD-10-CM

## 2025-01-03 DIAGNOSIS — Z85.038 PERSONAL HISTORY OF OTHER MALIGNANT NEOPLASM OF LARGE INTEST: ICD-10-CM

## 2025-01-03 DIAGNOSIS — Z86.0102 PERSONAL HISTORY OF HYPERPLASTIC COLON POLYPS: ICD-10-CM

## 2025-01-03 DIAGNOSIS — D12.2 BENIGN NEOPLASM OF ASCENDING COLON: ICD-10-CM

## 2025-01-03 DIAGNOSIS — Z09 ENCOUNTER FOR FOLLOW-UP EXAMINATION AFTER COMPLETED TREATMEN: ICD-10-CM

## 2025-01-03 PROBLEM — K63.5 POLYP OF COLON: Status: ACTIVE | Noted: 2025-01-03

## 2025-01-03 PROCEDURE — 88305 TISSUE EXAM BY PATHOLOGIST: CPT | Performed by: PATHOLOGY

## 2025-01-03 PROCEDURE — 45385 COLONOSCOPY W/LESION REMOVAL: CPT | Mod: 33 | Performed by: INTERNAL MEDICINE

## 2025-01-03 RX ADMIN — ONDANSETRON HYDROCHLORIDE 4 MG: 4 SOLUTION ORAL at 08:56

## 2025-02-13 DIAGNOSIS — J30.2 SEASONAL ALLERGIES: ICD-10-CM

## 2025-02-13 DIAGNOSIS — E66.01 CLASS 2 SEVERE OBESITY DUE TO EXCESS CALORIES WITH SERIOUS COMORBIDITY AND BODY MASS INDEX (BMI) OF 39.0 TO 39.9 IN ADULT: ICD-10-CM

## 2025-02-13 DIAGNOSIS — E66.812 CLASS 2 SEVERE OBESITY DUE TO EXCESS CALORIES WITH SERIOUS COMORBIDITY AND BODY MASS INDEX (BMI) OF 39.0 TO 39.9 IN ADULT: ICD-10-CM

## 2025-02-13 NOTE — TELEPHONE ENCOUNTER
Caller: Eve Garrett    Relationship: Self    Best call back number: [phone]    Requested Prescriptions:   Requested Prescriptions     Pending Prescriptions Disp Refills    naltrexone-bupropion ER (CONTRAVE) 8-90 MG tablet 120 tablet 3     Sig: Take 2 tablets by mouth 2 (Two) Times a Day.    Fluticasone-Umeclidin-Vilant (Trelegy Ellipta) 100-62.5-25 MCG/ACT inhaler 60 each 3     Sig: Inhale 1 puff Daily.    fluticasone (FLONASE) 50 MCG/ACT nasal spray       Sig: Administer 2 sprays into the nostril(s) as directed by provider Daily.        Pharmacy where request should be sent: [place] [phone] [facility] [phone]      Last office visit with prescribing clinician: 8/23/2024   Last telemedicine visit with prescribing clinician: Visit date not found   Next office visit with prescribing clinician: 2/25/2025     Additional details provided by patient: PATIENT WANTS TO  FROM [facility] TOMORROW, FRIDAY, 2/14/25.    Does the patient have less than a 3 day supply:  [x] Yes  [] No    Would you like a call back once the refill request has been completed: [] Yes [x] No    If the office needs to give you a call back, can they leave a voicemail: [] Yes [x] No    Isaias Olmedo   02/13/25 16:00 EST

## 2025-02-14 RX ORDER — FLUTICASONE FUROATE, UMECLIDINIUM BROMIDE AND VILANTEROL TRIFENATATE 100; 62.5; 25 UG/1; UG/1; UG/1
1 POWDER RESPIRATORY (INHALATION)
Qty: 60 EACH | Refills: 3 | Status: SHIPPED | OUTPATIENT
Start: 2025-02-14

## 2025-02-14 RX ORDER — FLUTICASONE PROPIONATE 50 MCG
2 SPRAY, SUSPENSION (ML) NASAL DAILY
Qty: 16 G | Refills: 3 | Status: SHIPPED | OUTPATIENT
Start: 2025-02-14

## 2025-04-15 ENCOUNTER — TELEPHONE (OUTPATIENT)
Dept: FAMILY MEDICINE CLINIC | Facility: CLINIC | Age: 65
End: 2025-04-15
Payer: OTHER GOVERNMENT

## 2025-04-15 DIAGNOSIS — J30.89 ENVIRONMENTAL AND SEASONAL ALLERGIES: Primary | ICD-10-CM

## 2025-07-11 ENCOUNTER — TELEPHONE (OUTPATIENT)
Dept: FAMILY MEDICINE CLINIC | Facility: CLINIC | Age: 65
End: 2025-07-11
Payer: OTHER GOVERNMENT

## 2025-07-15 DIAGNOSIS — D37.3 NEOPLASM OF UNCERTAIN OR UNKNOWN BEHAVIOR OF APPENDIX: Primary | ICD-10-CM

## 2025-07-15 NOTE — TELEPHONE ENCOUNTER
Caller: Eve Garrett    Relationship: Self    Best call back number: [phone]         Who are you requesting to speak with (clinical staff, provider,  specific staff member): PCP OR CLINICAL        What was the call regarding: CURRENT REFERRAL RUNS OUT ON 7/17 AND HER APPOINTMENT IS 7/22.  SHE WAS TOLD SHE HAS TO HAVE A NEW REFERRAL.

## 2025-07-28 NOTE — PROGRESS NOTES
Subjective   Eve Garrett is a 64 y.o. female.       HPI   Pt is here today with concern that her ankles are swelling.   Symptom started several months ago.  She also reports pain from knees to toes in both legs.  Describes this as a stabbing pain.  She denies any redness or warmth over legs; no skin color changes.  No sores or lesions on legs that don't heal.  She says she has been limiting her salt but may not be drinking much water and has not used compression socks.    She denies any leg injuries.    She is noted to be on amlodipine 10 mg daily for HTN.     Has been on Contrave to help with weight loss since last year.  Felt it helped her loose some weight initially but has noted weight loss has stalled the past several months.  She would like to try a different medication.  She is working on a healthy diet and trying to increase her walking for exercise.  Current weight is 253; BMI 40.      She is concerned her bladder has dropped since having a hysterectomy.  She can feel a pressure over the bladder area when she urinates and feels like she may not be emptying her bladder completely.  She denies any painful urination; no blood seen in urine.      The following portions of the patient's history were reviewed and updated as appropriate: allergies, current medications, past family history, past medical history, past social history, past surgical history, and problem list.    Review of Systems   Constitutional:  Negative for chills, fatigue and fever.   Respiratory:  Negative for cough, shortness of breath and wheezing.    Cardiovascular:  Positive for leg swelling. Negative for chest pain and palpitations.   Gastrointestinal:  Negative for diarrhea, nausea, vomiting and indigestion.   Genitourinary:  Positive for pelvic pressure. Negative for decreased urine volume, difficulty urinating, dysuria, frequency, hematuria, pelvic pain, urgency and urinary incontinence.   Musculoskeletal:  Positive for arthralgias and  myalgias.   Neurological:  Negative for dizziness, weakness, light-headedness, numbness and headache.   Psychiatric/Behavioral:  Negative for depressed mood. The patient is not nervous/anxious.        Objective   Physical Exam  Vitals reviewed.   Constitutional:       General: She is not in acute distress.     Appearance: Normal appearance. She is obese.   Cardiovascular:      Rate and Rhythm: Normal rate and regular rhythm.      Pulses: Normal pulses.           Dorsalis pedis pulses are 2+ on the right side and 2+ on the left side.      Heart sounds: Normal heart sounds. No murmur heard.  Pulmonary:      Effort: Pulmonary effort is normal. No respiratory distress.      Breath sounds: Normal breath sounds. No wheezing, rhonchi or rales.   Chest:      Chest wall: No tenderness.   Abdominal:      Tenderness: There is no right CVA tenderness or left CVA tenderness.   Musculoskeletal:      Right lower leg: Edema (trace) present.      Left lower leg: Edema (trace) present.   Skin:     General: Skin is warm and dry.      Findings: No bruising, erythema, lesion or rash.   Neurological:      General: No focal deficit present.      Mental Status: She is alert and oriented to person, place, and time.      Sensory: No sensory deficit.      Motor: No weakness.      Gait: Gait normal.   Psychiatric:         Mood and Affect: Mood normal.                  Procedures   Assessment & Plan   Diagnoses and all orders for this visit:    1. Pain in both lower extremities (Primary)  Comments:  FELIBERTO ordered bilateral.   Labs ordered.  Orders:  -     Doppler Arterial Multi Level Lower Extremity - Bilateral CAR; Future  -     CBC w AUTO Differential; Future  -     Basic metabolic panel; Future    2. Mild peripheral edema  Comments:  FELIBERTO ordered bilateral.   Labs ordered.   Wear compression socks.   Limit salt intake.   Prop feet up when sitting.   Consider switching amlodipine.  Orders:  -     Doppler Arterial Multi Level Lower Extremity -  Bilateral CAR; Future  -     CBC w AUTO Differential; Future  -     Basic metabolic panel; Future    3. Class 3 severe obesity due to excess calories with body mass index (BMI) of 40.0 to 44.9 in adult  Comments:  Stop Contrave.   Will start Wegovy 0.25 mg weekly.   Work on healthy diet; aim for 150 min exercise weekly.   RTO in 4 weeks.  Orders:  -     Semaglutide-Weight Management (Wegovy) 0.25 MG/0.5ML solution auto-injector; Inject 0.5 mL under the skin into the appropriate area as directed 1 (One) Time Per Week.  Dispense: 2 mL; Refill: 0    4. Incomplete bladder emptying  Comments:  Referral given to urology for evaluation.  Orders:  -     Ambulatory Referral to Urology

## 2025-07-29 ENCOUNTER — OFFICE VISIT (OUTPATIENT)
Dept: FAMILY MEDICINE CLINIC | Facility: CLINIC | Age: 65
End: 2025-07-29
Payer: OTHER GOVERNMENT

## 2025-07-29 ENCOUNTER — LAB (OUTPATIENT)
Dept: FAMILY MEDICINE CLINIC | Facility: CLINIC | Age: 65
End: 2025-07-29
Payer: OTHER GOVERNMENT

## 2025-07-29 VITALS
OXYGEN SATURATION: 95 % | HEART RATE: 72 BPM | HEIGHT: 66 IN | BODY MASS INDEX: 40.66 KG/M2 | DIASTOLIC BLOOD PRESSURE: 80 MMHG | SYSTOLIC BLOOD PRESSURE: 141 MMHG | WEIGHT: 253 LBS

## 2025-07-29 DIAGNOSIS — R33.9 INCOMPLETE BLADDER EMPTYING: ICD-10-CM

## 2025-07-29 DIAGNOSIS — M79.605 PAIN IN BOTH LOWER EXTREMITIES: Primary | ICD-10-CM

## 2025-07-29 DIAGNOSIS — R60.0 MILD PERIPHERAL EDEMA: ICD-10-CM

## 2025-07-29 DIAGNOSIS — M79.604 PAIN IN BOTH LOWER EXTREMITIES: Primary | ICD-10-CM

## 2025-07-29 DIAGNOSIS — E66.813 CLASS 3 SEVERE OBESITY DUE TO EXCESS CALORIES WITH BODY MASS INDEX (BMI) OF 40.0 TO 44.9 IN ADULT: ICD-10-CM

## 2025-07-29 DIAGNOSIS — M79.604 PAIN IN BOTH LOWER EXTREMITIES: ICD-10-CM

## 2025-07-29 DIAGNOSIS — M79.605 PAIN IN BOTH LOWER EXTREMITIES: ICD-10-CM

## 2025-07-29 LAB
ANION GAP SERPL CALCULATED.3IONS-SCNC: 10 MMOL/L (ref 5–15)
BASOPHILS # BLD AUTO: 0.06 10*3/MM3 (ref 0–0.2)
BASOPHILS NFR BLD AUTO: 1.1 % (ref 0–1.5)
BUN SERPL-MCNC: 11 MG/DL (ref 8–23)
BUN/CREAT SERPL: 17.5 (ref 7–25)
CALCIUM SPEC-SCNC: 9.5 MG/DL (ref 8.6–10.5)
CHLORIDE SERPL-SCNC: 105 MMOL/L (ref 98–107)
CO2 SERPL-SCNC: 24 MMOL/L (ref 22–29)
CREAT SERPL-MCNC: 0.63 MG/DL (ref 0.57–1)
DEPRECATED RDW RBC AUTO: 42.2 FL (ref 37–54)
EGFRCR SERPLBLD CKD-EPI 2021: 99.8 ML/MIN/1.73
EOSINOPHIL # BLD AUTO: 0.22 10*3/MM3 (ref 0–0.4)
EOSINOPHIL NFR BLD AUTO: 4 % (ref 0.3–6.2)
ERYTHROCYTE [DISTWIDTH] IN BLOOD BY AUTOMATED COUNT: 14.1 % (ref 12.3–15.4)
GLUCOSE SERPL-MCNC: 136 MG/DL (ref 65–99)
HCT VFR BLD AUTO: 42 % (ref 34–46.6)
HGB BLD-MCNC: 13.7 G/DL (ref 12–15.9)
IMM GRANULOCYTES # BLD AUTO: 0.02 10*3/MM3 (ref 0–0.05)
IMM GRANULOCYTES NFR BLD AUTO: 0.4 % (ref 0–0.5)
LYMPHOCYTES # BLD AUTO: 1.8 10*3/MM3 (ref 0.7–3.1)
LYMPHOCYTES NFR BLD AUTO: 32.4 % (ref 19.6–45.3)
MCH RBC QN AUTO: 27.3 PG (ref 26.6–33)
MCHC RBC AUTO-ENTMCNC: 32.6 G/DL (ref 31.5–35.7)
MCV RBC AUTO: 83.7 FL (ref 79–97)
MONOCYTES # BLD AUTO: 0.62 10*3/MM3 (ref 0.1–0.9)
MONOCYTES NFR BLD AUTO: 11.2 % (ref 5–12)
NEUTROPHILS NFR BLD AUTO: 2.84 10*3/MM3 (ref 1.7–7)
NEUTROPHILS NFR BLD AUTO: 50.9 % (ref 42.7–76)
NRBC BLD AUTO-RTO: 0 /100 WBC (ref 0–0.2)
PLATELET # BLD AUTO: 244 10*3/MM3 (ref 140–450)
PMV BLD AUTO: 10.8 FL (ref 6–12)
POTASSIUM SERPL-SCNC: 4.5 MMOL/L (ref 3.5–5.2)
RBC # BLD AUTO: 5.02 10*6/MM3 (ref 3.77–5.28)
SODIUM SERPL-SCNC: 139 MMOL/L (ref 136–145)
WBC NRBC COR # BLD AUTO: 5.56 10*3/MM3 (ref 3.4–10.8)

## 2025-07-29 PROCEDURE — 85025 COMPLETE CBC W/AUTO DIFF WBC: CPT | Performed by: NURSE PRACTITIONER

## 2025-07-29 PROCEDURE — 83036 HEMOGLOBIN GLYCOSYLATED A1C: CPT | Performed by: NURSE PRACTITIONER

## 2025-07-29 PROCEDURE — 36415 COLL VENOUS BLD VENIPUNCTURE: CPT

## 2025-07-29 PROCEDURE — 99214 OFFICE O/P EST MOD 30 MIN: CPT | Performed by: NURSE PRACTITIONER

## 2025-07-29 PROCEDURE — 80048 BASIC METABOLIC PNL TOTAL CA: CPT | Performed by: NURSE PRACTITIONER

## 2025-07-29 RX ORDER — SEMAGLUTIDE 0.25 MG/.5ML
0.25 INJECTION, SOLUTION SUBCUTANEOUS WEEKLY
Qty: 2 ML | Refills: 0 | Status: SHIPPED | OUTPATIENT
Start: 2025-07-29 | End: 2025-07-30

## 2025-07-30 DIAGNOSIS — R73.9 HYPERGLYCEMIA: Primary | ICD-10-CM

## 2025-07-30 DIAGNOSIS — E11.65 TYPE 2 DIABETES MELLITUS WITH HYPERGLYCEMIA, WITHOUT LONG-TERM CURRENT USE OF INSULIN: Primary | ICD-10-CM

## 2025-07-30 DIAGNOSIS — I10 PRIMARY HYPERTENSION: ICD-10-CM

## 2025-07-30 DIAGNOSIS — E78.2 MIXED HYPERLIPIDEMIA: ICD-10-CM

## 2025-07-30 LAB — HBA1C MFR BLD: 6.9 % (ref 4.8–5.6)

## 2025-07-30 RX ORDER — SEMAGLUTIDE 0.68 MG/ML
0.25 INJECTION, SOLUTION SUBCUTANEOUS WEEKLY
Qty: 3 ML | Refills: 0 | Status: SHIPPED | OUTPATIENT
Start: 2025-07-30

## 2025-08-04 ENCOUNTER — TELEPHONE (OUTPATIENT)
Dept: FAMILY MEDICINE CLINIC | Facility: CLINIC | Age: 65
End: 2025-08-04
Payer: OTHER GOVERNMENT

## 2025-08-06 DIAGNOSIS — E11.65 TYPE 2 DIABETES MELLITUS WITH HYPERGLYCEMIA, WITHOUT LONG-TERM CURRENT USE OF INSULIN: Primary | ICD-10-CM

## 2025-08-06 RX ORDER — METFORMIN HYDROCHLORIDE 500 MG/1
500 TABLET, EXTENDED RELEASE ORAL
Qty: 90 TABLET | Refills: 1 | Status: SHIPPED | OUTPATIENT
Start: 2025-08-06

## 2025-08-11 ENCOUNTER — HOSPITAL ENCOUNTER (OUTPATIENT)
Dept: CARDIOLOGY | Facility: HOSPITAL | Age: 65
Discharge: HOME | End: 2025-08-11
Admitting: NURSE PRACTITIONER
Payer: OTHER GOVERNMENT

## 2025-08-11 DIAGNOSIS — M79.605 PAIN IN BOTH LOWER EXTREMITIES: ICD-10-CM

## 2025-08-11 DIAGNOSIS — M79.604 PAIN IN BOTH LOWER EXTREMITIES: ICD-10-CM

## 2025-08-11 DIAGNOSIS — R60.0 MILD PERIPHERAL EDEMA: ICD-10-CM

## 2025-08-11 LAB
BH CV LOWER ARTERIAL LEFT ABI RATIO: 1.06
BH CV LOWER ARTERIAL LEFT DORSALIS PEDIS SYS MAX: 146
BH CV LOWER ARTERIAL LEFT GREAT TOE SYS MAX: 126
BH CV LOWER ARTERIAL LEFT POST TIBIAL SYS MAX: 182
BH CV LOWER ARTERIAL LEFT TBI RATIO: 0.73
BH CV LOWER ARTERIAL RIGHT ABI RATIO: 1.04
BH CV LOWER ARTERIAL RIGHT DORSALIS PEDIS SYS MAX: 137
BH CV LOWER ARTERIAL RIGHT GREAT TOE SYS MAX: 127
BH CV LOWER ARTERIAL RIGHT POST TIBIAL SYS MAX: 179
BH CV LOWER ARTERIAL RIGHT TBI RATIO: 0.74
UPPER ARTERIAL LEFT ARM BRACHIAL SYS MAX: 172
UPPER ARTERIAL RIGHT ARM BRACHIAL SYS MAX: 157

## 2025-08-11 PROCEDURE — 93922 UPR/L XTREMITY ART 2 LEVELS: CPT | Performed by: SURGERY

## 2025-08-11 PROCEDURE — 93923 UPR/LXTR ART STDY 3+ LVLS: CPT

## 2025-08-11 PROCEDURE — 93922 UPR/L XTREMITY ART 2 LEVELS: CPT
